# Patient Record
Sex: FEMALE | Race: WHITE | NOT HISPANIC OR LATINO | Employment: OTHER | URBAN - METROPOLITAN AREA
[De-identification: names, ages, dates, MRNs, and addresses within clinical notes are randomized per-mention and may not be internally consistent; named-entity substitution may affect disease eponyms.]

---

## 2017-02-07 ENCOUNTER — COMMUNICATION - HEALTHEAST (OUTPATIENT)
Dept: INFECTIOUS DISEASES | Facility: CLINIC | Age: 82
End: 2017-02-07

## 2017-02-07 DIAGNOSIS — T84.59XA INFECTED PROSTHETIC KNEE JOINT (H): ICD-10-CM

## 2017-02-07 DIAGNOSIS — Z96.659 INFECTED PROSTHETIC KNEE JOINT (H): ICD-10-CM

## 2017-02-08 ENCOUNTER — COMMUNICATION - HEALTHEAST (OUTPATIENT)
Dept: INFECTIOUS DISEASES | Facility: CLINIC | Age: 82
End: 2017-02-08

## 2017-02-15 ENCOUNTER — OFFICE VISIT (OUTPATIENT)
Dept: FAMILY MEDICINE | Facility: CLINIC | Age: 82
End: 2017-02-15
Payer: MEDICARE

## 2017-02-15 VITALS
DIASTOLIC BLOOD PRESSURE: 84 MMHG | WEIGHT: 149 LBS | SYSTOLIC BLOOD PRESSURE: 138 MMHG | HEIGHT: 65 IN | TEMPERATURE: 98.4 F | HEART RATE: 84 BPM | BODY MASS INDEX: 24.83 KG/M2

## 2017-02-15 DIAGNOSIS — M19.90 ARTHRITIS: ICD-10-CM

## 2017-02-15 DIAGNOSIS — K21.9 GASTROESOPHAGEAL REFLUX DISEASE WITHOUT ESOPHAGITIS: ICD-10-CM

## 2017-02-15 DIAGNOSIS — E87.1 HYPONATREMIA: Primary | ICD-10-CM

## 2017-02-15 DIAGNOSIS — T84.50XS JOINT PROSTHESIS INFECTION OR INFLAMMATION, SEQUELA: Chronic | ICD-10-CM

## 2017-02-15 DIAGNOSIS — K30 FUNCTIONAL DYSPEPSIA: ICD-10-CM

## 2017-02-15 DIAGNOSIS — D50.0 IRON DEFICIENCY ANEMIA DUE TO CHRONIC BLOOD LOSS: ICD-10-CM

## 2017-02-15 LAB
ALBUMIN SERPL-MCNC: 3.3 G/DL (ref 3.4–5)
ALP SERPL-CCNC: 109 U/L (ref 40–150)
ALT SERPL W P-5'-P-CCNC: 19 U/L (ref 0–50)
ANION GAP SERPL CALCULATED.3IONS-SCNC: 6 MMOL/L (ref 3–14)
AST SERPL W P-5'-P-CCNC: 13 U/L (ref 0–45)
BILIRUB SERPL-MCNC: 0.3 MG/DL (ref 0.2–1.3)
BUN SERPL-MCNC: 12 MG/DL (ref 7–30)
CALCIUM SERPL-MCNC: 8.6 MG/DL (ref 8.5–10.1)
CHLORIDE SERPL-SCNC: 99 MMOL/L (ref 94–109)
CO2 SERPL-SCNC: 29 MMOL/L (ref 20–32)
CREAT SERPL-MCNC: 0.51 MG/DL (ref 0.52–1.04)
ERYTHROCYTE [DISTWIDTH] IN BLOOD BY AUTOMATED COUNT: 13.3 % (ref 10–15)
GFR SERPL CREATININE-BSD FRML MDRD: ABNORMAL ML/MIN/1.7M2
GLUCOSE SERPL-MCNC: 98 MG/DL (ref 70–99)
HCT VFR BLD AUTO: 36.3 % (ref 35–47)
HGB BLD-MCNC: 12 G/DL (ref 11.7–15.7)
MCH RBC QN AUTO: 29.9 PG (ref 26.5–33)
MCHC RBC AUTO-ENTMCNC: 33.1 G/DL (ref 31.5–36.5)
MCV RBC AUTO: 90 FL (ref 78–100)
PLATELET # BLD AUTO: 280 10E9/L (ref 150–450)
POTASSIUM SERPL-SCNC: 4.5 MMOL/L (ref 3.4–5.3)
PROT SERPL-MCNC: 6.5 G/DL (ref 6.8–8.8)
RBC # BLD AUTO: 4.02 10E12/L (ref 3.8–5.2)
SODIUM SERPL-SCNC: 134 MMOL/L (ref 133–144)
WBC # BLD AUTO: 5.6 10E9/L (ref 4–11)

## 2017-02-15 PROCEDURE — 80053 COMPREHEN METABOLIC PANEL: CPT | Performed by: FAMILY MEDICINE

## 2017-02-15 PROCEDURE — 36415 COLL VENOUS BLD VENIPUNCTURE: CPT | Performed by: FAMILY MEDICINE

## 2017-02-15 PROCEDURE — 99214 OFFICE O/P EST MOD 30 MIN: CPT | Performed by: FAMILY MEDICINE

## 2017-02-15 PROCEDURE — 85027 COMPLETE CBC AUTOMATED: CPT | Performed by: FAMILY MEDICINE

## 2017-02-15 ASSESSMENT — ANXIETY QUESTIONNAIRES
2. NOT BEING ABLE TO STOP OR CONTROL WORRYING: NOT AT ALL
6. BECOMING EASILY ANNOYED OR IRRITABLE: SEVERAL DAYS
3. WORRYING TOO MUCH ABOUT DIFFERENT THINGS: SEVERAL DAYS
1. FEELING NERVOUS, ANXIOUS, OR ON EDGE: SEVERAL DAYS
7. FEELING AFRAID AS IF SOMETHING AWFUL MIGHT HAPPEN: NOT AT ALL
GAD7 TOTAL SCORE: 4
5. BEING SO RESTLESS THAT IT IS HARD TO SIT STILL: NOT AT ALL

## 2017-02-15 ASSESSMENT — PATIENT HEALTH QUESTIONNAIRE - PHQ9: 5. POOR APPETITE OR OVEREATING: SEVERAL DAYS

## 2017-02-15 NOTE — NURSING NOTE
"Chief Complaint   Patient presents with     RECHECK     check in       Initial /84 (BP Location: Right arm, Cuff Size: Adult Small)  Pulse 84  Temp 98.4  F (36.9  C) (Tympanic)  Ht 5' 5\" (1.651 m)  Wt 149 lb (67.6 kg)  BMI 24.79 kg/m2 Estimated body mass index is 24.79 kg/(m^2) as calculated from the following:    Height as of this encounter: 5' 5\" (1.651 m).    Weight as of this encounter: 149 lb (67.6 kg).  Medication Reconciliation: complete  "

## 2017-02-15 NOTE — MR AVS SNAPSHOT
After Visit Summary   2/15/2017    Arielle Stallworth    MRN: [id number]           Patient Information     Date Of Birth          6/9/1931        Visit Information        Provider Department      2/15/2017 10:00 AM Constance Cohen MD [facility]        Today's Diagnoses     Hyponatremia    -  1    Iron deficiency anemia due to chronic blood loss        Arthritis        Joint prosthesis infection or inflammation, sequela        Gastroesophageal reflux disease without esophagitis        Functional dyspepsia          Care Instructions    I will send you the labs when they are back with instructions  I would like see you back in 6 months         Follow-ups after your visit        Follow-up notes from your care team     Return in about 6 months (around 8/15/2017).      Who to contact     Normal or non-critical lab and imaging results will be communicated to you by [employer], letter or phone within 4 business days after the clinic has received the results. If you do not hear from us within 7 days, please contact the clinic through [employer] or phone. If you have a critical or abnormal lab result, we will notify you by phone as soon as possible.  Submit refill requests through [employer] or call your pharmacy and they will forward the refill request to us. Please allow 3 business days for your refill to be completed.          If you need to speak with a  for additional information , please call: [phone]             Additional Information About Your Visit        [employer] Information     [employer] gives you secure access to your electronic health record. If you see a primary care provider, you can also send messages to your care team and make appointments. If you have questions, please call your primary care clinic.  If you do not have a primary care provider, please call [phone] and they will assist you.        Care EveryWhere ID     This is your Care EveryWhere ID. This could be used  "by other organizations to access your [place] medical records  [id number]        Your Vitals Were     Pulse Temperature Height BMI (Body Mass Index)          84 98.4  F (36.9  C) (Tympanic) 5' 5\" (1.651 m) 24.79 kg/m2         Blood Pressure from Last 3 Encounters:   02/15/17 138/84   07/20/16 117/71   04/29/16 132/86    Weight from Last 3 Encounters:   02/15/17 149 lb (67.6 kg)   07/20/16 154 lb (69.9 kg)   03/09/16 157 lb (71.2 kg)              We Performed the Following     CBC with platelets     Comprehensive metabolic panel          Today's Medication Changes          These changes are accurate as of: 2/15/17 11:59 PM.  If you have any questions, ask your nurse or doctor.               These medicines have changed or have updated prescriptions.        Dose/Directions    ranitidine 150 MG tablet   Commonly known as:  ZANTAC   This may have changed:  additional instructions   Used for:  Functional dyspepsia, Gastroesophageal reflux disease without esophagitis   Changed by:  Constance Cohen MD        Dose:  150 mg   Take 1 tablet (150 mg) by mouth 2 times daily May have an additional third dose after lunch as needed   Quantity:  270 tablet   Refills:  3       triamcinolone 0.1 % cream   Commonly known as:  KENALOG   This may have changed:  Another medication with the same name was removed. Continue taking this medication, and follow the directions you see here.   Used for:  Dry skin dermatitis   Changed by:  Constance Cohen MD        Apply sparingly to affected area three times daily as needed   Quantity:  80 g   Refills:  0            Where to get your medicines      These medications were sent to [facility] - [place], [place] - [address]  [address], [facility] [facility] [address]     Phone:  [phone]     ranitidine 150 MG tablet                Primary Care Provider Office Phone # Fax #    Constance Cohen [phone]       [facility] [address] " [address] [facility] [address]        Thank you!     Thank you for choosing [facility]  for your care. Our goal is always to provide you with excellent care. Hearing back from our patients is one way we can continue to improve our services. Please take a few minutes to complete the written survey that you may receive in the mail after your visit with us. Thank you!             Your Updated Medication List - Protect others around you: Learn how to safely use, store and throw away your medicines at www.disposemymeds.org.          This list is accurate as of: 2/15/17 11:59 PM.  Always use your most recent med list.                   Brand Name Dispense Instructions for use    acetaminophen 650 MG 8 hour tablet     100 tablet    Take 650 mg by mouth every 4 hours as needed for mild pain       aspirin 81 MG EC tablet     90 tablet    Take 1 tablet (81 mg) by mouth daily       calcium 600/vitamin D 600-400 MG-UNIT per tablet   Generic drug:  calcium-vitamin D     60 tablet    Take 1 tablet by mouth 2 times daily       Doxycycline Hyclate 150 MG Tabs     60 tablet    Take 150 mg by mouth 2 times daily Resume once course of levaquin is completed       EFFEXOR  MG 24 hr capsule   Generic drug:  venlafaxine      1 CAPSULE BY MOUTH DAILY WITH FOOD       LORazepam 0.5 MG tablet    ATIVAN    24 tablet    Take 1 tablet (0.5 mg) by mouth every 8 hours as needed for anxiety       LUMIGAN 0.01 % Soln   Generic drug:  bimatoprost      Place 1 drop into both eyes At Bedtime.       MELATONIN PO      Take 3 mg by mouth nightly as needed       MIRTAZAPINE PO      Take 30 mg by mouth At Bedtime       MULTI-VITAMIN PO      Take 1 tablet by mouth daily.       order for DME     1 Units    Equipment being ordered: Wheelchair with back and seat cushion       order for DME     1 Units    Equipment being ordered: orthopedic shoes with riser for limb length disrepancy from       penicillin V potassium 500 MG tablet    VEETID      Take 500 mg by mouth 2 times daily       PROBIOTIC DAILY PO      Take by mouth daily       ranitidine 150 MG tablet    ZANTAC    270 tablet    Take 1 tablet (150 mg) by mouth 2 times daily May have an additional third dose after lunch as needed       sodium chloride 1 GM tablet     90 tablet    Take m w f but may need to take daily if sodium out of range       STOOL SOFTENER PO      Take 100 mg by mouth daily.       traZODone 50 MG tablet    DESYREL     Take by mouth At Bedtime 1 and 1/2 to 2 tabs       triamcinolone 0.1 % cream    KENALOG    80 g    Apply sparingly to affected area three times daily as needed

## 2017-02-15 NOTE — PATIENT INSTRUCTIONS
I will send you the labs when they are back with instructions  I would like see you back in 6 months

## 2017-02-16 ASSESSMENT — PATIENT HEALTH QUESTIONNAIRE - PHQ9: SUM OF ALL RESPONSES TO PHQ QUESTIONS 1-9: 4

## 2017-02-16 ASSESSMENT — ANXIETY QUESTIONNAIRES: GAD7 TOTAL SCORE: 4

## 2017-02-20 NOTE — PROGRESS NOTES
Arielle,  Your lab results were normal/stable. Please feel free to my chart or call the office with questions. Constance Cohen M.D.

## 2017-02-23 ENCOUNTER — AMBULATORY - HEALTHEAST (OUTPATIENT)
Dept: CARDIOLOGY | Facility: CLINIC | Age: 82
End: 2017-02-23

## 2017-02-23 ENCOUNTER — TRANSFERRED RECORDS (OUTPATIENT)
Dept: HEALTH INFORMATION MANAGEMENT | Facility: CLINIC | Age: 82
End: 2017-02-23

## 2017-02-23 DIAGNOSIS — Z95.0 PACEMAKER: ICD-10-CM

## 2017-05-16 ENCOUNTER — OFFICE VISIT - HEALTHEAST (OUTPATIENT)
Dept: INFECTIOUS DISEASES | Facility: CLINIC | Age: 82
End: 2017-05-16

## 2017-05-16 ENCOUNTER — TRANSFERRED RECORDS (OUTPATIENT)
Dept: HEALTH INFORMATION MANAGEMENT | Facility: CLINIC | Age: 82
End: 2017-05-16

## 2017-05-16 DIAGNOSIS — T84.59XD CHRONIC INFECTION OF KNEE JOINT PROSTHESIS, SUBSEQUENT ENCOUNTER: ICD-10-CM

## 2017-05-16 DIAGNOSIS — Z96.659 CHRONIC INFECTION OF KNEE JOINT PROSTHESIS, SUBSEQUENT ENCOUNTER: ICD-10-CM

## 2017-06-01 ENCOUNTER — AMBULATORY - HEALTHEAST (OUTPATIENT)
Dept: CARDIOLOGY | Facility: CLINIC | Age: 82
End: 2017-06-01

## 2017-06-01 DIAGNOSIS — Z95.0 CARDIAC PACEMAKER IN SITU: ICD-10-CM

## 2017-06-01 LAB — HCC DEVICE COMMENTS: NORMAL

## 2017-06-27 ENCOUNTER — DOCUMENTATION ONLY (OUTPATIENT)
Dept: LAB | Facility: CLINIC | Age: 82
End: 2017-06-27

## 2017-06-27 DIAGNOSIS — E87.1 HYPONATREMIA: Primary | ICD-10-CM

## 2017-06-27 DIAGNOSIS — Z13.220 SCREENING CHOLESTEROL LEVEL: Primary | ICD-10-CM

## 2017-06-27 LAB
ALBUMIN SERPL-MCNC: 3.5 G/DL (ref 3.4–5)
ALP SERPL-CCNC: 103 U/L (ref 40–150)
ALT SERPL W P-5'-P-CCNC: 23 U/L (ref 0–50)
ANION GAP SERPL CALCULATED.3IONS-SCNC: 8 MMOL/L (ref 3–14)
AST SERPL W P-5'-P-CCNC: 25 U/L (ref 0–45)
BASOPHILS # BLD AUTO: 0 10E9/L (ref 0–0.2)
BASOPHILS NFR BLD AUTO: 0.5 %
BILIRUB SERPL-MCNC: 0.3 MG/DL (ref 0.2–1.3)
BUN SERPL-MCNC: 15 MG/DL (ref 7–30)
CALCIUM SERPL-MCNC: 9 MG/DL (ref 8.5–10.1)
CHLORIDE SERPL-SCNC: 95 MMOL/L (ref 94–109)
CO2 SERPL-SCNC: 26 MMOL/L (ref 20–32)
CREAT SERPL-MCNC: 0.49 MG/DL (ref 0.52–1.04)
DIFFERENTIAL METHOD BLD: NORMAL
EOSINOPHIL # BLD AUTO: 0.3 10E9/L (ref 0–0.7)
EOSINOPHIL NFR BLD AUTO: 4.3 %
ERYTHROCYTE [DISTWIDTH] IN BLOOD BY AUTOMATED COUNT: 13 % (ref 10–15)
GFR SERPL CREATININE-BSD FRML MDRD: ABNORMAL ML/MIN/1.7M2
GLUCOSE SERPL-MCNC: 117 MG/DL (ref 70–99)
HCT VFR BLD AUTO: 37 % (ref 35–47)
HGB BLD-MCNC: 12.3 G/DL (ref 11.7–15.7)
LYMPHOCYTES # BLD AUTO: 1 10E9/L (ref 0.8–5.3)
LYMPHOCYTES NFR BLD AUTO: 15.7 %
MCH RBC QN AUTO: 30.3 PG (ref 26.5–33)
MCHC RBC AUTO-ENTMCNC: 33.2 G/DL (ref 31.5–36.5)
MCV RBC AUTO: 91 FL (ref 78–100)
MONOCYTES # BLD AUTO: 0.5 10E9/L (ref 0–1.3)
MONOCYTES NFR BLD AUTO: 8.4 %
NEUTROPHILS # BLD AUTO: 4.3 10E9/L (ref 1.6–8.3)
NEUTROPHILS NFR BLD AUTO: 71.1 %
PLATELET # BLD AUTO: 284 10E9/L (ref 150–450)
POTASSIUM SERPL-SCNC: 4.5 MMOL/L (ref 3.4–5.3)
PROT SERPL-MCNC: 6.9 G/DL (ref 6.8–8.8)
RBC # BLD AUTO: 4.06 10E12/L (ref 3.8–5.2)
SODIUM SERPL-SCNC: 129 MMOL/L (ref 133–144)
WBC # BLD AUTO: 6.1 10E9/L (ref 4–11)

## 2017-06-27 PROCEDURE — 36415 COLL VENOUS BLD VENIPUNCTURE: CPT | Performed by: FAMILY MEDICINE

## 2017-06-27 PROCEDURE — 80053 COMPREHEN METABOLIC PANEL: CPT | Performed by: FAMILY MEDICINE

## 2017-06-27 PROCEDURE — 85025 COMPLETE CBC W/AUTO DIFF WBC: CPT | Performed by: FAMILY MEDICINE

## 2017-06-27 NOTE — PROGRESS NOTES
Dr. Cohen: Lipids ordered. Not sure what else to order for lab draw this afternoon? Thank you.  Bryant Raman RN

## 2017-06-27 NOTE — PROGRESS NOTES
PATIENT coming in at 2 this afternoon for labwork.  No orders in epic.  Only due for LIPID in Health Maintenance and she probably wont be fasting that late in afternoon.  Lab appointment notes states labs for darling.  Please put in future order or call patient and tell her not to come in if nothing needed.  Thanks! Mikala

## 2017-06-30 ENCOUNTER — TELEPHONE (OUTPATIENT)
Dept: FAMILY MEDICINE | Facility: CLINIC | Age: 82
End: 2017-06-30

## 2017-06-30 DIAGNOSIS — E87.1 HYPONATREMIA: Primary | ICD-10-CM

## 2017-06-30 NOTE — TELEPHONE ENCOUNTER
Reason for Call:  Request for results:    Name of test or procedure: labs drawn on 6/27/17.  Daughter Frances is calling hoping to get Arielle's lab results.  Please review and advise. Thank you..Lucia Edwards    Date of test of procedure: 6/27/17    Location of the test or procedure: blood draw    OK to leave the result message on voice mail or with a family member? YES    Phone number Patient can be reached at:  Other phone number:  [facility] - [phone]    Additional comments: none    Call taken on 6/30/2017 at 1:11 PM by Lucia Edwards

## 2017-07-01 ENCOUNTER — NURSE TRIAGE (OUTPATIENT)
Dept: NURSING | Facility: CLINIC | Age: 82
End: 2017-07-01

## 2017-07-01 NOTE — TELEPHONE ENCOUNTER
Regarding: Patient is looking for her sodium level results. Thank you.  ----- Message from Martín Tomlin sent at 7/1/2017  9:43 AM CDT -----  Reason for Call:  Request for results:    Name of test or procedure: Comprehensive metabolic panel    Date of test of procedure: 06/27/2017    Location of the test or procedure: [facility] to leave the result message on voice mail or with a family member? YES    Phone number Patient can be reached at:  Other phone number:  [phone]    Additional comments: Patient looking for the Sodium levels.    Call taken on 7/1/2017 at 9:41 AM by Martín Tomlin

## 2017-07-01 NOTE — TELEPHONE ENCOUNTER
"  Additional Information    Negative: [1] Difficult to awaken or acting confused (disoriented, slurred speech) AND [2] present now AND [3] diabetic    Negative: [1] Difficult to awaken or acting confused (disoriented, slurred speech) AND [2] present now AND [3] new onset    Negative: [1] Weakness of the face, arm, or leg on one side of the body AND [2] new onset    Negative: [1] Numbness of the face, arm, or leg on one side of the body AND [2] new onset    Negative: [1] Loss of speech or garbled speech AND [2] new onset    Negative: Difficulty breathing or bluish lips    Negative: Shock suspected (e.g., cold/pale/clammy skin, too weak to stand, low BP, rapid pulse)    Negative: Seeing, hearing, or feeling things that are not there (i.e., visual, auditory, or tactile hallucinations)    Negative: Followed a head injury    Negative: Drug overdose suspected    Negative: Sounds like a life-threatening emergency to the triager    Negative: Alcohol use, abuse or dependence: question or problem related to    Negative: Drug abuse or dependence: question or problem related to    Negative: Headache or vomiting    Negative: Stiff neck (can't touch chin to chest)    Negative: Bizarre or paranoid behavior    Negative: Fever > 100.5 F (38.1 C)    Negative: Patient sounds very sick or weak to the triager    Negative: [1] Acting confused (e.g., disoriented, slurred speech) AND [2] brief (now gone)    Negative: [1] Longstanding confusion (e.g., dementia, stroke) AND [2] worsening    Negative: [1] Longstanding confusion (e.g., dementia, stroke) AND [2] NO worsening or change    Sundowning, questions about    Protocols used: CONFUSION - DELIRIUM-ADULT-AH  Daughter calling. \"I'm just calling to find out what my mom's sodium level is. She gets confused when her sodium level is low. Mom seems to be more anxious the last couple of evenings.\"  Lori Mariano RN  [facility]    "

## 2017-07-11 NOTE — TELEPHONE ENCOUNTER
The sodium is a little lower but not as low as before. I would recommend increasing the sodium intake by 1 gram daily for the next week and getting the lab rechecked . The kidney function is fine and the blood count is normal. Constance Cohen M.D.

## 2017-07-12 NOTE — TELEPHONE ENCOUNTER
Spoke with the daughter Frances who is authorized to speak for patient and advised of note below. Frances states patient is only taking 2 gm sodium chloride tablet per week, so advised per note below to increase 1 gm daily for a week, then recheck the sodium. Daughter will call back to schedule the sodium. Sodium lab placed per note from PCP below.   OLIVER Samayoa

## 2017-07-24 ENCOUNTER — ALLIED HEALTH/NURSE VISIT (OUTPATIENT)
Dept: FAMILY MEDICINE | Facility: CLINIC | Age: 82
End: 2017-07-24
Payer: MEDICARE

## 2017-07-24 VITALS — SYSTOLIC BLOOD PRESSURE: 143 MMHG | HEART RATE: 86 BPM | DIASTOLIC BLOOD PRESSURE: 87 MMHG

## 2017-07-24 DIAGNOSIS — Z01.30 BLOOD PRESSURE CHECK: Primary | ICD-10-CM

## 2017-07-24 DIAGNOSIS — E87.1 HYPONATREMIA: ICD-10-CM

## 2017-07-24 LAB — SODIUM SERPL-SCNC: 131 MMOL/L (ref 133–144)

## 2017-07-24 PROCEDURE — 36415 COLL VENOUS BLD VENIPUNCTURE: CPT | Performed by: FAMILY MEDICINE

## 2017-07-24 PROCEDURE — 99207 ZZC NO CHARGE NURSE ONLY: CPT

## 2017-07-24 PROCEDURE — 84295 ASSAY OF SERUM SODIUM: CPT | Performed by: FAMILY MEDICINE

## 2017-07-24 NOTE — PROGRESS NOTES
Patient is currently taking the Sodium Chloride 1 Gm every other day.     BP Readings from Last 6 Encounters:   07/24/17 143/87   02/15/17 138/84   07/20/16 117/71   04/29/16 132/86   03/09/16 136/78   07/29/15 151/81

## 2017-07-24 NOTE — MR AVS SNAPSHOT
After Visit Summary   7/24/2017    Arielle Stallworth    MRN: [id number]           Patient Information     Date Of Birth          6/9/1931        Visit Information        Provider Department      7/24/2017 2:15 PM [facility]        Today's Diagnoses     Blood pressure check    -  1       Follow-ups after your visit        Who to contact     Normal or non-critical lab and imaging results will be communicated to you by [employer], letter or phone within 4 business days after the clinic has received the results. If you do not hear from us within 7 days, please contact the clinic through [employer] or phone. If you have a critical or abnormal lab result, we will notify you by phone as soon as possible.  Submit refill requests through [employer] or call your pharmacy and they will forward the refill request to us. Please allow 3 business days for your refill to be completed.          If you need to speak with a  for additional information , please call: [phone]             Additional Information About Your Visit        [employer] [facility] Information     [employer] gives you secure access to your electronic health record. If you see a primary care provider, you can also send messages to your care team and make appointments. If you have questions, please call your primary care clinic.  If you do not have a primary care provider, please call [phone] and they will assist you.        Care EveryWhere ID     This is your Care EveryWhere ID. This could be used by other organizations to access your [place] medical records  [id number]        Your Vitals Were     Pulse                   86            Blood Pressure from Last 3 Encounters:   07/24/17 143/87   02/15/17 138/84   07/20/16 117/71    Weight from Last 3 Encounters:   02/15/17 149 lb (67.6 kg)   07/20/16 154 lb (69.9 kg)   03/09/16 157 lb (71.2 kg)              Today, you had the following     No orders found for display        Primary Care Provider Office Phone # Fax #    Constance Cohen [phone]       [facility] [address] [address] [address]        Equal Access to Services     EMANUEL CENTENO : Hadii ofe ku hadchero Soomaali, waaxda luqadaha, qaybta kaalmada adeegyada, vickie stephensdeven humphrey. So [facility] [phone].    ATENCIÓN: Si habla español, tiene a earl disposición servicios gratuitos de asistencia lingüística. Llame al [phone].    We comply with applicable federal civil rights laws and [place] laws. We do not discriminate on the basis of race, color, national origin, age, disability sex, sexual orientation or gender identity.            Thank you!     Thank you for choosing [facility]]  for your care. Our goal is always to provide you with excellent care. Hearing back from our patients is one way we can continue to improve our services. Please take a few minutes to complete the written survey that you may receive in the mail after your visit with us. Thank you!             Your Updated Medication List - Protect others around you: Learn how to safely use, store and throw away your medicines at www.disposemymeds.org.          This list is accurate as of: 7/24/17  3:10 PM.  Always use your most recent med list.                   Brand Name Dispense Instructions for use Diagnosis    acetaminophen 650 MG 8 hour tablet     100 tablet    Take 650 mg by mouth every 4 hours as needed for mild pain    Left leg cellulitis       aspirin 81 MG EC tablet     90 tablet    Take 1 tablet (81 mg) by mouth daily    Acquired absence of knee joint       calcium 600/vitamin D 600-400 MG-UNIT per tablet   Generic drug:  calcium-vitamin D     60 tablet    Take 1 tablet by mouth 2 times daily        Doxycycline Hyclate 150 MG Tabs     60 tablet    Take 150 mg by mouth 2 times daily Resume once course of levaquin is completed    Left leg cellulitis       EFFEXOR  MG 24 hr capsule    Generic drug:  venlafaxine      1 CAPSULE BY MOUTH DAILY WITH FOOD        LORazepam 0.5 MG tablet    ATIVAN    24 tablet    Take 1 tablet (0.5 mg) by mouth every 8 hours as needed for anxiety    DORON (generalized anxiety disorder)       LUMIGAN 0.01 % Soln   Generic drug:  bimatoprost      Place 1 drop into both eyes At Bedtime.        MELATONIN PO      Take 3 mg by mouth nightly as needed        MIRTAZAPINE PO      Take 30 mg by mouth At Bedtime        MULTI-VITAMIN PO      Take 1 tablet by mouth daily.        order for DME     1 Units    Equipment being ordered: Wheelchair with back and seat cushion    Acquired absence of knee joint       order for DME     1 Units    Equipment being ordered: orthopedic shoes with riser for limb length disrepancy from    Acquired leg length discrepancy, Joint prosthesis infection or inflammation, sequela       penicillin V potassium 500 MG tablet    VEETID     Take 500 mg by mouth 2 times daily        PROBIOTIC DAILY PO      Take by mouth daily        ranitidine 150 MG tablet    ZANTAC    270 tablet    Take 1 tablet (150 mg) by mouth 2 times daily May have an additional third dose after lunch as needed    Functional dyspepsia, Gastroesophageal reflux disease without esophagitis       sodium chloride 1 GM tablet     90 tablet    Take m w f but may need to take daily if sodium out of range    Hyponatremia       STOOL SOFTENER PO      Take 100 mg by mouth daily.        traZODone 50 MG tablet    DESYREL     Take by mouth At Bedtime 1 and 1/2 to 2 tabs        triamcinolone 0.1 % cream    KENALOG    80 g    Apply sparingly to affected area three times daily as needed    Dry skin dermatitis

## 2017-07-27 ENCOUNTER — COMMUNICATION - HEALTHEAST (OUTPATIENT)
Dept: INFECTIOUS DISEASES | Facility: CLINIC | Age: 82
End: 2017-07-27

## 2017-07-27 DIAGNOSIS — T84.59XA INFECTED PROSTHETIC KNEE JOINT (H): ICD-10-CM

## 2017-07-27 DIAGNOSIS — Z96.659 INFECTED PROSTHETIC KNEE JOINT (H): ICD-10-CM

## 2017-07-31 ENCOUNTER — COMMUNICATION - HEALTHEAST (OUTPATIENT)
Dept: INFECTIOUS DISEASES | Facility: CLINIC | Age: 82
End: 2017-07-31

## 2017-07-31 DIAGNOSIS — Z96.659 INFECTED PROSTHETIC KNEE JOINT (H): ICD-10-CM

## 2017-07-31 DIAGNOSIS — T84.59XA INFECTED PROSTHETIC KNEE JOINT (H): ICD-10-CM

## 2017-08-01 ENCOUNTER — COMMUNICATION - HEALTHEAST (OUTPATIENT)
Dept: INFECTIOUS DISEASES | Facility: CLINIC | Age: 82
End: 2017-08-01

## 2017-08-02 ENCOUNTER — COMMUNICATION - HEALTHEAST (OUTPATIENT)
Dept: INFECTIOUS DISEASES | Facility: CLINIC | Age: 82
End: 2017-08-02

## 2017-08-22 DIAGNOSIS — L30.9 DERMATITIS: Primary | ICD-10-CM

## 2017-08-22 NOTE — TELEPHONE ENCOUNTER
NYSTATIN-TRIAMCINOL 468369-0.1 OINT        Last Written Prescription Date: 7/20/16  Last Fill Quantity: 80,  # refills: 0   Last Office Visit with [facility], [facility] or [facility] prescribing provider: 2/15/17

## 2017-08-23 RX ORDER — NYSTATIN AND TRIAMCINOLONE ACETONIDE 100000; 1 [USP'U]/G; MG/G
OINTMENT TOPICAL
Qty: 30 G | Refills: 0 | Status: SHIPPED | OUTPATIENT
Start: 2017-08-23 | End: 2018-04-10

## 2017-08-23 NOTE — TELEPHONE ENCOUNTER
Routing refill request to provider for review/approval because:  Drug not on the [facility] refill protocol   Bryant Raman RN

## 2017-09-07 ENCOUNTER — AMBULATORY - HEALTHEAST (OUTPATIENT)
Dept: CARDIOLOGY | Facility: CLINIC | Age: 82
End: 2017-09-07

## 2017-09-07 DIAGNOSIS — Z95.0 CARDIAC PACEMAKER IN SITU: ICD-10-CM

## 2017-09-07 LAB — HCC DEVICE COMMENTS: NORMAL

## 2017-09-30 DIAGNOSIS — E87.1 HYPONATREMIA: ICD-10-CM

## 2017-10-02 RX ORDER — SODIUM CHLORIDE 1 G/1
TABLET ORAL
Qty: 90 TABLET | Refills: 0 | Status: SHIPPED | OUTPATIENT
Start: 2017-10-02 | End: 2018-06-04

## 2017-10-02 NOTE — TELEPHONE ENCOUNTER
sodium chloride 1 GM tablet      Last Written Prescription Date: 7/20/16  Last Fill Quantity: 90,  # refills: 3   Last Office Visit with [facility], [facility] or [facility] prescribing provider: 2/15/17

## 2017-10-06 ENCOUNTER — TELEPHONE (OUTPATIENT)
Dept: FAMILY MEDICINE | Facility: CLINIC | Age: 82
End: 2017-10-06

## 2017-10-06 DIAGNOSIS — E87.1 HYPONATREMIA: Primary | ICD-10-CM

## 2017-10-06 DIAGNOSIS — D50.8 OTHER IRON DEFICIENCY ANEMIA: ICD-10-CM

## 2017-10-06 NOTE — TELEPHONE ENCOUNTER
Reason for Call: Request for an order or referral:    Order or referral being requested: Arielle has appointment on 10/11/17 and she is wanting to get sodium, iron and whatever other labs she needs done before she is seen.  Please review and place orders if appropriate.  She would like to be called when orders are placed.  Thank you..Lucia Edwards    Date needed: before 10/11/17.      Has the patient been seen by the PCP for this problem? NO - but has appointment on 10/11/17    Additional comments: n/a    Phone number Patient can be reached at:  Home number on file [phone] (home)    Best Time:  Any time    Can we leave a detailed message on this number?  YES    Call taken on 10/6/2017 at 3:47 PM by Lucia Edwards

## 2017-10-11 ENCOUNTER — OFFICE VISIT (OUTPATIENT)
Dept: FAMILY MEDICINE | Facility: CLINIC | Age: 82
End: 2017-10-11
Payer: MEDICARE

## 2017-10-11 VITALS
SYSTOLIC BLOOD PRESSURE: 146 MMHG | WEIGHT: 149 LBS | TEMPERATURE: 98.6 F | DIASTOLIC BLOOD PRESSURE: 94 MMHG | BODY MASS INDEX: 24.79 KG/M2 | HEART RATE: 81 BPM

## 2017-10-11 DIAGNOSIS — E87.1 HYPONATREMIA: Primary | ICD-10-CM

## 2017-10-11 DIAGNOSIS — K21.9 GASTROESOPHAGEAL REFLUX DISEASE WITHOUT ESOPHAGITIS: ICD-10-CM

## 2017-10-11 DIAGNOSIS — D50.8 OTHER IRON DEFICIENCY ANEMIA: ICD-10-CM

## 2017-10-11 DIAGNOSIS — R53.83 OTHER FATIGUE: ICD-10-CM

## 2017-10-11 LAB
BASOPHILS # BLD AUTO: 0 10E9/L (ref 0–0.2)
BASOPHILS NFR BLD AUTO: 0.7 %
DIFFERENTIAL METHOD BLD: NORMAL
EOSINOPHIL # BLD AUTO: 0.3 10E9/L (ref 0–0.7)
EOSINOPHIL NFR BLD AUTO: 5.8 %
ERYTHROCYTE [DISTWIDTH] IN BLOOD BY AUTOMATED COUNT: 12.7 % (ref 10–15)
HCT VFR BLD AUTO: 35.9 % (ref 35–47)
HGB BLD-MCNC: 12.1 G/DL (ref 11.7–15.7)
LYMPHOCYTES # BLD AUTO: 1.2 10E9/L (ref 0.8–5.3)
LYMPHOCYTES NFR BLD AUTO: 20.8 %
MCH RBC QN AUTO: 29.9 PG (ref 26.5–33)
MCHC RBC AUTO-ENTMCNC: 33.7 G/DL (ref 31.5–36.5)
MCV RBC AUTO: 89 FL (ref 78–100)
MONOCYTES # BLD AUTO: 0.6 10E9/L (ref 0–1.3)
MONOCYTES NFR BLD AUTO: 10.7 %
NEUTROPHILS # BLD AUTO: 3.4 10E9/L (ref 1.6–8.3)
NEUTROPHILS NFR BLD AUTO: 62 %
PLATELET # BLD AUTO: 267 10E9/L (ref 150–450)
RBC # BLD AUTO: 4.05 10E12/L (ref 3.8–5.2)
WBC # BLD AUTO: 5.5 10E9/L (ref 4–11)

## 2017-10-11 PROCEDURE — 99214 OFFICE O/P EST MOD 30 MIN: CPT | Performed by: FAMILY MEDICINE

## 2017-10-11 PROCEDURE — 80050 GENERAL HEALTH PANEL: CPT | Performed by: FAMILY MEDICINE

## 2017-10-11 PROCEDURE — 84443 ASSAY THYROID STIM HORMONE: CPT | Performed by: FAMILY MEDICINE

## 2017-10-11 PROCEDURE — 36415 COLL VENOUS BLD VENIPUNCTURE: CPT | Performed by: FAMILY MEDICINE

## 2017-10-11 PROCEDURE — 80053 COMPREHEN METABOLIC PANEL: CPT | Performed by: FAMILY MEDICINE

## 2017-10-11 RX ORDER — MECOBALAMIN 5000 MCG
TABLET,DISINTEGRATING ORAL
Qty: 60 CAPSULE | Refills: 3 | Status: SHIPPED | OUTPATIENT
Start: 2017-10-11 | End: 2018-04-10

## 2017-10-11 ASSESSMENT — ANXIETY QUESTIONNAIRES
7. FEELING AFRAID AS IF SOMETHING AWFUL MIGHT HAPPEN: NOT AT ALL
GAD7 TOTAL SCORE: 3
6. BECOMING EASILY ANNOYED OR IRRITABLE: NOT AT ALL
5. BEING SO RESTLESS THAT IT IS HARD TO SIT STILL: NOT AT ALL
1. FEELING NERVOUS, ANXIOUS, OR ON EDGE: SEVERAL DAYS
2. NOT BEING ABLE TO STOP OR CONTROL WORRYING: SEVERAL DAYS
3. WORRYING TOO MUCH ABOUT DIFFERENT THINGS: SEVERAL DAYS

## 2017-10-11 ASSESSMENT — PATIENT HEALTH QUESTIONNAIRE - PHQ9
SUM OF ALL RESPONSES TO PHQ QUESTIONS 1-9: 4
5. POOR APPETITE OR OVEREATING: NOT AT ALL

## 2017-10-11 NOTE — PATIENT INSTRUCTIONS
Take a break from the calcium  Take the prevacid 2 times per day for 2 weeks then decrease to 1 time daily   I will send you the labs when they are back

## 2017-10-11 NOTE — PROGRESS NOTES
SUBJECTIVE:                                                    Arielle Stallworth is a 86 year old female who presents to clinic today for the following health issues:    Chief Complaint   Patient presents with     Abdominal Pain     at lot of burning, has decreased since last week.        ABDOMINAL PAIN     Onset: of and on since last week.     Description:   Character: upset stomach   Location: lower epigastric region  Radiation: None    Progression of Symptoms:      Accompanying Signs & Symptoms:  Fever/Chills?: no   Gas/Bloating: YES  Nausea: YES  Vomitting: no   Diarrhea?: no   Constipation:YES  Dysuria or Hematuria: no    History:       Precipitating factors:   Does the pain change with:     Food: no      BM: no     Urination: no     Alleviating factors:  Tums    Therapies Tried and outcome:    LMP:  n/a         Problem list and histories reviewed & adjusted, as indicated.  Additional history: not taking her ppi she has history of  Hiatal hernia. We have tried carafate, zantac, pepcid, prilosec  Not helpful. She has been on long term doxy and pcn . This is the most likely  Cause of her dyspepsia but she still has decreased her oral intake due to the nausea  Has continued to see her psychiatrist . She is still a very negative person with negative outlook on life. Very hard to turn her toward any positive thought  History of  Hyponatremia, no recent sx ofthis last ab 131 pretty normal for her.   Reviewed studies in care everywhere and with her hiatal hernia  Antibiotics intake she will likely need PPI coverage for the rest of her life. We had discussed this previously but  Will reinforce this now   With change in medications she will likely increase her oral intake her nausea will improve     Wt Readings from Last 5 Encounters:   10/11/17 149 lb (67.6 kg)   02/15/17 149 lb (67.6 kg)   07/20/16 154 lb (69.9 kg)   03/09/16 157 lb (71.2 kg)   07/29/15 154 lb (69.9 kg)         Patient Active Problem List   Diagnosis      Cardiac pacemaker in situ     Advanced directives, counseling/discussion     Hyponatremia     Intertrigo     Depression, major     Arthritis     Cellulitis of left leg     Sepsis (H)     [facility]     Anxiety     Anemia     Joint prosthesis infection or inflammation (H)     Lymphedema of left lower extremity     Hyponatremia     Past Surgical History:   Procedure Laterality Date     CARPAL TUNNEL RELEASE RT/LT Left 3/2015    and excision mucous cyst lt thumb     CYSTOCELE REPAIR       HERNIA REPAIR      Twice     HERNIA REPAIR  3/2010    Reduction of a strangulated incisional inguinal hernia with repair of multiple incisional hernias with mesh.      HYSTERECTOMY       HYSTERECTOMY RADICAL      2002     JOINT REPLACEMENT      Left x2 and rt x1     ORTHOPEDIC SURGERY      Bilat. knees       Social History   Substance Use Topics     Smoking status: Former Smoker     Packs/day: 0.50     Years: 30.00     Types: Cigarettes     Quit date: 1/1/1960     Smokeless tobacco: Never Used     Alcohol use Yes      Comment: Occasional     No family history on file.      Current Outpatient Prescriptions   Medication Sig Dispense Refill     LANsoprazole (PREVACID) 15 MG CR capsule Take 30-60 minutes before a meal. Take 1 2 times per day for 2 weeks then decrease to 1 pill daily 60 capsule 3     sodium chloride 1 GM tablet TAKE ONE TABLET BY MOUTH ON MONDAY, WEDNESDAY, AND FRIDAY. TAKE DAILY IF SODIUM IS OUT OF RANGE 90 tablet 0     nystatin-triamcinolone (MYCOLOG) ointment APPLY TO AFFECTED AREA TWO TIMES A DAY 30 g 0     ranitidine (ZANTAC) 150 MG tablet Take 1 tablet (150 mg) by mouth 2 times daily May have an additional third dose after lunch as needed 270 tablet 3     order for DME Equipment being ordered: orthopedic shoes with riser for limb length disrepancy from 1 Units 0     triamcinolone (KENALOG) 0.1 % cream Apply sparingly to affected area three times daily as needed 80 g 0     penicillin V potassium (VEETID) 500 MG  tablet Take 500 mg by mouth 2 times daily       LORazepam (ATIVAN) 0.5 MG tablet Take 1 tablet (0.5 mg) by mouth every 8 hours as needed for anxiety 24 tablet 0     Doxycycline Hyclate 150 MG TABS Take 150 mg by mouth 2 times daily Resume once course of levaquin is completed 60 tablet      MIRTAZAPINE PO Take 30 mg by mouth At Bedtime       acetaminophen 650 MG TABS Take 650 mg by mouth every 4 hours as needed for mild pain 100 tablet      MELATONIN PO Take 3 mg by mouth nightly as needed       ORDER FOR DME Equipment being ordered: Wheelchair with back and seat cushion 1 Units 0     aspirin 81 MG EC tablet Take 1 tablet (81 mg) by mouth daily 90 tablet 3     traZODone (DESYREL) 50 MG tablet Take by mouth At Bedtime 1 and 1/2 to 2 tabs       Multiple Vitamin (MULTI-VITAMIN PO) Take 1 tablet by mouth daily.       Docusate Calcium (STOOL SOFTENER PO) Take 100 mg by mouth daily.       bimatoprost (LUMIGAN) 0.01 % SOLN Place 1 drop into both eyes At Bedtime.       EFFEXOR XR# 150 MG OR CP24 1 CAPSULE BY MOUTH DAILY WITH FOOD       Probiotic Product (PROBIOTIC DAILY PO) Take by mouth daily       Recent Labs   Lab Test  10/11/17   1433  06/27/17   1405  02/15/17   1106   06/22/15   0615   ALT  20  23  19   < >   --    CR  0.58  0.49*  0.51*   < >  0.50*   GFRESTIMATED  >90  >90  Non African American GFR Calc    >90  Non  GFR Calc     < >  >90  Non  GFR Calc     GFRESTBLACK  >90  >90  African American GFR Calc    >90   GFR Calc     < >  >90   GFR Calc     POTASSIUM  4.5  4.5  4.5   < >  3.9   TSH  1.55   --    --    --   1.16    < > = values in this interval not displayed.            ROS:  Constitutional, HEENT, cardiovascular, pulmonary, GI, , musculoskeletal, neuro, skin, endocrine and psych systems are negative, except as otherwise noted.      OBJECTIVE:                                                    BP (!) 146/94  Pulse 81  Temp 98.6  F (37  C)  (Tympanic)  Wt 149 lb (67.6 kg)  BMI 24.79 kg/m2 Body mass index is 24.79 kg/(m^2).   GENERAL: healthy, alert, well nourished, well hydrated, no distress  HENT: ear canals- normal; TMs- normal; Nose- normal; Mouth- no ulcers, no lesions  NECK: no tenderness, no adenopathy, no asymmetry, no masses, no stiffness; thyroid- normal to palpation  RESP: lungs clear to auscultation - no rales, no rhonchi, no wheezes  CV: regular rates and rhythm, normal S1 S2, no S3 or S4 and no murmur, no click or rub -  ABDOMEN: soft, no tenderness, no  hepatosplenomegaly, no masses, normal bowel sounds  Knee Exam: Inspection: AP/lateral alignment normal  Tender:overall mildly tender with braceon in wheel chair   Active Range of Motion: she can bend and her extension is full able to transfer from chair with assist   Strength: core strength  low and decreased strength left leg can transfer from wheelchair to bed she canot step more manolo 2-3 steps without walker able to walk with assistance   Special tests: normal Valgus stress test, normal Varus    Also examined: pes planus     SKIN: no suspicious lesions, no rashes     1. Hyponatremia  Recheck today has been betterwith current treatment   - **Comprehensive metabolic panel FUTURE 14d    2. Other iron deficiency anemia  Need recheck she has not been taking supplements . Her sleep has been disturbed. If this is low will restart iron replacement   - **Comprehensive metabolic panel FUTURE 14d  - **CBC with platelets differential FUTURE 2mo    3. Gastroesophageal reflux disease without esophagitis  Restart this she will most likely need to take this for the rest of her life due to possible hiatle hernis but also because she needs to sonu edoxy and pcn/  - LANsoprazole (PREVACID) 15 MG CR capsule; Take 30-60 minutes before a meal. Take 1 2 times per day for 2 weeks then decrease to 1 pill daily  Dispense: 60 capsule; Refill: 3    4. Other fatigue  Due to high level of thyroid failure in older  adults will check thyroid  Hornones.   - TSH with free T4 reflex    ASSESSMENT/PLAN:                                                     reports that she quit smoking about 57 years ago. Her smoking use included Cigarettes. She has a 15.00 pack-year smoking history. She has never used smokeless tobacco.      Patient Instructions   Take a break from the calcium  Take the prevacid 2 times per day for 2 weeks then decrease to 1 time daily   I will send you the labs when they are back        Weight management plan: Discussed healthy diet and exercise guidelines and patient will follow up in 3 months in clinic to re-evaluate.  Constance Cohen M.D.  [facility]

## 2017-10-11 NOTE — MR AVS SNAPSHOT
After Visit Summary   10/11/2017    Arielle Stallworth    MRN: [id number]           Patient Information     Date Of Birth          6/9/1931        Visit Information        Provider Department      10/11/2017 1:30 PM Constance Cohen MD [facility]        Today's Diagnoses     Hyponatremia    -  1    Other iron deficiency anemia        Gastroesophageal reflux disease without esophagitis        Other fatigue          Care Instructions    Take a break from the calcium  Take the prevacid 2 times per day for 2 weeks then decrease to 1 time daily   I will send you the labs when they are back            Follow-ups after your visit        Who to contact     Normal or non-critical lab and imaging results will be communicated to you by [employer], letter or phone within 4 business days after the clinic has received the results. If you do not hear from us within 7 days, please contact the clinic through [employer] or phone. If you have a critical or abnormal lab result, we will notify you by phone as soon as possible.  Submit refill requests through [employer] or call your pharmacy and they will forward the refill request to us. Please allow 3 business days for your refill to be completed.          If you need to speak with a  for additional information , please call: [phone]             Additional Information About Your Visit        M[employer]ar[employer]de Information     [employer] gives you secure access to your electronic health record. If you see a primary care provider, you can also send messages to your care team and make appointments. If you have questions, please call your primary care clinic.  If you do not have a primary care provider, please call [phone] and they will assist you.        Care EveryWhere ID     This is your Care EveryWhere ID. This could be used by other organizations to access your [place] medical records  [id number]        Your Vitals Were     Pulse Temperature BMI (Body Mass  Index)             81 98.6  F (37  C) (Tympanic) 24.79 kg/m2          Blood Pressure from Last 3 Encounters:   10/11/17 (!) 146/94   07/24/17 143/87   02/15/17 138/84    Weight from Last 3 Encounters:   10/11/17 149 lb (67.6 kg)   02/15/17 149 lb (67.6 kg)   07/20/16 154 lb (69.9 kg)              We Performed the Following     **CBC with platelets differential FUTURE 2mo     **Comprehensive metabolic panel FUTURE 14d     TSH with free T4 reflex          Today's Medication Changes          These changes are accurate as of: 10/11/17  2:31 PM.  If you have any questions, ask your nurse or doctor.               Start taking these medicines.        Dose/Directions    LANsoprazole 15 MG CR capsule   Commonly known as:  PREVACID   Used for:  Gastroesophageal reflux disease without esophagitis        Take 30-60 minutes before a meal. Take 1 2 times per day for 2 weeks then decrease to 1 pill daily   Quantity:  60 capsule   Refills:  3         Stop taking these medicines if you haven't already. Please contact your care team if you have questions.     calcium 600/vitamin D 600-400 MG-UNIT per tablet   Generic drug:  calcium-vitamin D                Where to get your medicines      These medications were sent to [facility] - [place], [place] - [address]  [address] [address]     Phone:  [phone]     LANsoprazole 15 MG CR capsule                Primary Care Provider Office Phone # Fax #    Constance Cohen [phone] [address] [facility] [address]        Equal Access to Services     [facility]: Hadii ofe dixon hadasho Sosulemaali, waaxda luqadaha, qaybta kaalmada adeegyamary, vickie yin. So [facility] [phone].    ATENCIÓN: Si habla español, tiene a earl disposición servicios gratuitos de asistencia lingüística. Llame al [phone].    We comply with applicable federal civil rights laws and [place] laws. We do  not discriminate on the basis of race, color, national origin, age, disability, sex, sexual orientation, or gender identity.            Thank you!     Thank you for choosing [facility]  for your care. Our goal is always to provide you with excellent care. Hearing back from our patients is one way we can continue to improve our services. Please take a few minutes to complete the written survey that you may receive in the mail after your visit with us. Thank you!             Your Updated Medication List - Protect others around you: Learn how to safely use, store and throw away your medicines at www.disposemymeds.org.          This list is accurate as of: 10/11/17  2:31 PM.  Always use your most recent med list.                   Brand Name Dispense Instructions for use Diagnosis    acetaminophen 650 MG 8 hour tablet     100 tablet    Take 650 mg by mouth every 4 hours as needed for mild pain    Left leg cellulitis       aspirin 81 MG EC tablet     90 tablet    Take 1 tablet (81 mg) by mouth daily    Acquired absence of knee joint       Doxycycline Hyclate 150 MG Tabs     60 tablet    Take 150 mg by mouth 2 times daily Resume once course of levaquin is completed    Left leg cellulitis       EFFEXOR  MG 24 hr capsule   Generic drug:  venlafaxine      1 CAPSULE BY MOUTH DAILY WITH FOOD        LANsoprazole 15 MG CR capsule    PREVACID    60 capsule    Take 30-60 minutes before a meal. Take 1 2 times per day for 2 weeks then decrease to 1 pill daily    Gastroesophageal reflux disease without esophagitis       LORazepam 0.5 MG tablet    ATIVAN    24 tablet    Take 1 tablet (0.5 mg) by mouth every 8 hours as needed for anxiety    DORON (generalized anxiety disorder)       LUMIGAN 0.01 % Soln   Generic drug:  bimatoprost      Place 1 drop into both eyes At Bedtime.        MELATONIN PO      Take 3 mg by mouth nightly as needed        MIRTAZAPINE PO      Take 30 mg by mouth At Bedtime        MULTI-VITAMIN PO       Take 1 tablet by mouth daily.        nystatin-triamcinolone ointment    MYCOLOG    30 g    APPLY TO AFFECTED AREA TWO TIMES A DAY    Dermatitis       order for DME     1 Units    Equipment being ordered: Wheelchair with back and seat cushion    Acquired absence of knee joint       order for DME     1 Units    Equipment being ordered: orthopedic shoes with riser for limb length disrepancy from    Acquired leg length discrepancy, Joint prosthesis infection or inflammation, sequela       penicillin V potassium 500 MG tablet    VEETID     Take 500 mg by mouth 2 times daily        PROBIOTIC DAILY PO      Take by mouth daily        ranitidine 150 MG tablet    ZANTAC    270 tablet    Take 1 tablet (150 mg) by mouth 2 times daily May have an additional third dose after lunch as needed    Functional dyspepsia, Gastroesophageal reflux disease without esophagitis       sodium chloride 1 GM tablet     90 tablet    TAKE ONE TABLET BY MOUTH ON MONDAY, WEDNESDAY, AND FRIDAY. TAKE DAILY IF SODIUM IS OUT OF RANGE    Hyponatremia       STOOL SOFTENER PO      Take 100 mg by mouth daily.        traZODone 50 MG tablet    DESYREL     Take by mouth At Bedtime 1 and 1/2 to 2 tabs        triamcinolone 0.1 % cream    KENALOG    80 g    Apply sparingly to affected area three times daily as needed    Dry skin dermatitis

## 2017-10-12 LAB
ALBUMIN SERPL-MCNC: 3.5 G/DL (ref 3.4–5)
ALP SERPL-CCNC: 104 U/L (ref 40–150)
ALT SERPL W P-5'-P-CCNC: 20 U/L (ref 0–50)
ANION GAP SERPL CALCULATED.3IONS-SCNC: 7 MMOL/L (ref 3–14)
AST SERPL W P-5'-P-CCNC: 15 U/L (ref 0–45)
BILIRUB SERPL-MCNC: 0.2 MG/DL (ref 0.2–1.3)
BUN SERPL-MCNC: 16 MG/DL (ref 7–30)
CALCIUM SERPL-MCNC: 8.5 MG/DL (ref 8.5–10.1)
CHLORIDE SERPL-SCNC: 91 MMOL/L (ref 94–109)
CO2 SERPL-SCNC: 27 MMOL/L (ref 20–32)
CREAT SERPL-MCNC: 0.58 MG/DL (ref 0.52–1.04)
GFR SERPL CREATININE-BSD FRML MDRD: >90 ML/MIN/1.7M2
GLUCOSE SERPL-MCNC: 102 MG/DL (ref 70–99)
POTASSIUM SERPL-SCNC: 4.5 MMOL/L (ref 3.4–5.3)
PROT SERPL-MCNC: 6.6 G/DL (ref 6.8–8.8)
SODIUM SERPL-SCNC: 125 MMOL/L (ref 133–144)
TSH SERPL DL<=0.005 MIU/L-ACNC: 1.55 MU/L (ref 0.4–4)

## 2017-10-12 ASSESSMENT — ANXIETY QUESTIONNAIRES: GAD7 TOTAL SCORE: 3

## 2017-10-25 ENCOUNTER — TELEPHONE (OUTPATIENT)
Dept: FAMILY MEDICINE | Facility: CLINIC | Age: 82
End: 2017-10-25

## 2017-10-25 NOTE — TELEPHONE ENCOUNTER
Patient would like the results of her blood work and when the results are done she would like it sent to her please.    Patient also would like PT for her legs.    Ana Blanco [place]

## 2017-10-25 NOTE — TELEPHONE ENCOUNTER
Patient would also like a phone call from Dr. Cohen if she has time to call her.    Ana Blanco [facility]

## 2017-11-04 ENCOUNTER — COMMUNICATION - HEALTHEAST (OUTPATIENT)
Dept: INFECTIOUS DISEASES | Facility: CLINIC | Age: 82
End: 2017-11-04

## 2017-11-04 DIAGNOSIS — Z96.659 INFECTED PROSTHETIC KNEE JOINT (H): ICD-10-CM

## 2017-11-04 DIAGNOSIS — T84.59XA INFECTED PROSTHETIC KNEE JOINT (H): ICD-10-CM

## 2017-11-06 ENCOUNTER — COMMUNICATION - HEALTHEAST (OUTPATIENT)
Dept: INFECTIOUS DISEASES | Facility: CLINIC | Age: 82
End: 2017-11-06

## 2017-11-06 DIAGNOSIS — Z96.659 INFECTED PROSTHETIC KNEE JOINT (H): ICD-10-CM

## 2017-11-06 DIAGNOSIS — T84.59XA INFECTED PROSTHETIC KNEE JOINT (H): ICD-10-CM

## 2017-11-14 ENCOUNTER — AMBULATORY - HEALTHEAST (OUTPATIENT)
Dept: CARDIOLOGY | Facility: CLINIC | Age: 82
End: 2017-11-14

## 2017-11-14 DIAGNOSIS — Z95.0 CARDIAC PACEMAKER IN SITU: ICD-10-CM

## 2017-11-14 LAB — HCC DEVICE COMMENTS: NORMAL

## 2017-11-14 ASSESSMENT — MIFFLIN-ST. JEOR: SCORE: 1083.6

## 2017-11-21 ENCOUNTER — OFFICE VISIT - HEALTHEAST (OUTPATIENT)
Dept: INFECTIOUS DISEASES | Facility: CLINIC | Age: 82
End: 2017-11-21

## 2017-11-21 ENCOUNTER — TRANSFERRED RECORDS (OUTPATIENT)
Dept: HEALTH INFORMATION MANAGEMENT | Facility: CLINIC | Age: 82
End: 2017-11-21

## 2017-11-21 ENCOUNTER — AMBULATORY - HEALTHEAST (OUTPATIENT)
Dept: LAB | Facility: CLINIC | Age: 82
End: 2017-11-21

## 2017-11-21 DIAGNOSIS — T84.59XS INFECTED PROSTHETIC KNEE JOINT, SEQUELA: ICD-10-CM

## 2017-11-21 DIAGNOSIS — Z96.659 INFECTED PROSTHETIC KNEE JOINT, SEQUELA: ICD-10-CM

## 2017-11-28 ENCOUNTER — COMMUNICATION - HEALTHEAST (OUTPATIENT)
Dept: INFECTIOUS DISEASES | Facility: CLINIC | Age: 82
End: 2017-11-28

## 2017-12-28 ENCOUNTER — TRANSFERRED RECORDS (OUTPATIENT)
Dept: HEALTH INFORMATION MANAGEMENT | Facility: CLINIC | Age: 82
End: 2017-12-28

## 2018-02-08 ENCOUNTER — AMBULATORY - HEALTHEAST (OUTPATIENT)
Dept: CARDIOLOGY | Facility: CLINIC | Age: 83
End: 2018-02-08

## 2018-02-08 DIAGNOSIS — Z95.0 CARDIAC PACEMAKER IN SITU: ICD-10-CM

## 2018-02-08 LAB — HCC DEVICE COMMENTS: NORMAL

## 2018-02-15 DIAGNOSIS — F33.9 RECURRENT MAJOR DEPRESSIVE DISORDER (H): ICD-10-CM

## 2018-02-15 DIAGNOSIS — F41.1 GENERALIZED ANXIETY DISORDER: Primary | ICD-10-CM

## 2018-02-15 DIAGNOSIS — E55.9 VITAMIN D DEFICIENCY: ICD-10-CM

## 2018-02-15 DIAGNOSIS — D50.8 OTHER IRON DEFICIENCY ANEMIA: ICD-10-CM

## 2018-02-15 DIAGNOSIS — Z13.220 SCREENING CHOLESTEROL LEVEL: ICD-10-CM

## 2018-02-15 LAB
BASOPHILS # BLD AUTO: 0 10E9/L (ref 0–0.2)
BASOPHILS NFR BLD AUTO: 0.6 %
DIFFERENTIAL METHOD BLD: NORMAL
EOSINOPHIL # BLD AUTO: 0.2 10E9/L (ref 0–0.7)
EOSINOPHIL NFR BLD AUTO: 4.6 %
ERYTHROCYTE [DISTWIDTH] IN BLOOD BY AUTOMATED COUNT: 12.8 % (ref 10–15)
HCT VFR BLD AUTO: 36.1 % (ref 35–47)
HGB BLD-MCNC: 12.2 G/DL (ref 11.7–15.7)
LYMPHOCYTES # BLD AUTO: 0.9 10E9/L (ref 0.8–5.3)
LYMPHOCYTES NFR BLD AUTO: 16.8 %
MCH RBC QN AUTO: 30.4 PG (ref 26.5–33)
MCHC RBC AUTO-ENTMCNC: 33.8 G/DL (ref 31.5–36.5)
MCV RBC AUTO: 90 FL (ref 78–100)
MONOCYTES # BLD AUTO: 0.5 10E9/L (ref 0–1.3)
MONOCYTES NFR BLD AUTO: 9.7 %
NEUTROPHILS # BLD AUTO: 3.5 10E9/L (ref 1.6–8.3)
NEUTROPHILS NFR BLD AUTO: 68.3 %
PLATELET # BLD AUTO: 328 10E9/L (ref 150–450)
RBC # BLD AUTO: 4.01 10E12/L (ref 3.8–5.2)
WBC # BLD AUTO: 5.1 10E9/L (ref 4–11)

## 2018-02-15 PROCEDURE — 82607 VITAMIN B-12: CPT | Performed by: PSYCHIATRY & NEUROLOGY

## 2018-02-15 PROCEDURE — 80048 BASIC METABOLIC PNL TOTAL CA: CPT | Performed by: PSYCHIATRY & NEUROLOGY

## 2018-02-15 PROCEDURE — 36415 COLL VENOUS BLD VENIPUNCTURE: CPT | Performed by: PSYCHIATRY & NEUROLOGY

## 2018-02-15 PROCEDURE — 80076 HEPATIC FUNCTION PANEL: CPT | Performed by: PSYCHIATRY & NEUROLOGY

## 2018-02-15 PROCEDURE — 84443 ASSAY THYROID STIM HORMONE: CPT | Performed by: PSYCHIATRY & NEUROLOGY

## 2018-02-15 PROCEDURE — 82306 VITAMIN D 25 HYDROXY: CPT | Performed by: PSYCHIATRY & NEUROLOGY

## 2018-02-15 PROCEDURE — 85025 COMPLETE CBC W/AUTO DIFF WBC: CPT | Performed by: PSYCHIATRY & NEUROLOGY

## 2018-02-16 LAB
ALBUMIN SERPL-MCNC: 3.6 G/DL (ref 3.4–5)
ALP SERPL-CCNC: 108 U/L (ref 40–150)
ALT SERPL W P-5'-P-CCNC: 29 U/L (ref 0–50)
ANION GAP SERPL CALCULATED.3IONS-SCNC: 8 MMOL/L (ref 3–14)
AST SERPL W P-5'-P-CCNC: 21 U/L (ref 0–45)
BILIRUB DIRECT SERPL-MCNC: <0.1 MG/DL (ref 0–0.2)
BILIRUB SERPL-MCNC: 0.3 MG/DL (ref 0.2–1.3)
BUN SERPL-MCNC: 11 MG/DL (ref 7–30)
CALCIUM SERPL-MCNC: 8.6 MG/DL (ref 8.5–10.1)
CHLORIDE SERPL-SCNC: 92 MMOL/L (ref 94–109)
CO2 SERPL-SCNC: 25 MMOL/L (ref 20–32)
CREAT SERPL-MCNC: 0.49 MG/DL (ref 0.52–1.04)
DEPRECATED CALCIDIOL+CALCIFEROL SERPL-MC: 27 UG/L (ref 20–75)
GFR SERPL CREATININE-BSD FRML MDRD: >90 ML/MIN/1.7M2
GLUCOSE SERPL-MCNC: 93 MG/DL (ref 70–99)
POTASSIUM SERPL-SCNC: 4.4 MMOL/L (ref 3.4–5.3)
PROT SERPL-MCNC: 6.8 G/DL (ref 6.8–8.8)
SODIUM SERPL-SCNC: 125 MMOL/L (ref 133–144)
TSH SERPL DL<=0.005 MIU/L-ACNC: 1.1 MU/L (ref 0.4–4)
VIT B12 SERPL-MCNC: 628 PG/ML (ref 193–986)

## 2018-02-26 ENCOUNTER — TELEPHONE (OUTPATIENT)
Dept: FAMILY MEDICINE | Facility: CLINIC | Age: 83
End: 2018-02-26

## 2018-02-26 DIAGNOSIS — E87.1 HYPONATREMIA: Primary | ICD-10-CM

## 2018-02-26 NOTE — TELEPHONE ENCOUNTER
Reason for Call:  Other call back    Detailed comments: Would like to speak with RN.  No other information was given.  Please call and assess. Thank you..Lucia Edwards    Phone Number Patient can be reached at: Home number on file [phone] (home)    Best Time: any time    Can we leave a detailed message on this number? YES    Call taken on 2/26/2018 at 1:48 PM by Lucia Edwards

## 2018-02-26 NOTE — TELEPHONE ENCOUNTER
"Please advise in Dr. Cohen's absence. Arielle says that she has been going to Dr. Gross, \"[facility] doctor for psychiatry.\" Arielle said that her sodium lab result was 125 on 2/15/18 so she went from taking sodium tablet every other day to taking it every day.  She said that she  is seeing Dr. Gross again in 3 weeks. Arielle would like to know when she should have her sodium level checked again and is asking that a lab order be placed.   Bryant Raman RN    "

## 2018-02-27 NOTE — TELEPHONE ENCOUNTER
Yes can recheck that, order placed.  She also has not been seen here for 1 year, would recommend follow up.  Barbara Spears PA-C

## 2018-02-28 NOTE — TELEPHONE ENCOUNTER
Patient notified and will call to schedule after speaking to daughter about a ride.    Kusum Bowman RN

## 2018-03-01 DIAGNOSIS — E87.1 HYPONATREMIA: ICD-10-CM

## 2018-03-01 PROCEDURE — 36415 COLL VENOUS BLD VENIPUNCTURE: CPT | Performed by: PHYSICIAN ASSISTANT

## 2018-03-01 PROCEDURE — 80048 BASIC METABOLIC PNL TOTAL CA: CPT | Performed by: PHYSICIAN ASSISTANT

## 2018-03-02 LAB
ANION GAP SERPL CALCULATED.3IONS-SCNC: 7 MMOL/L (ref 3–14)
BUN SERPL-MCNC: 13 MG/DL (ref 7–30)
CALCIUM SERPL-MCNC: 8.5 MG/DL (ref 8.5–10.1)
CHLORIDE SERPL-SCNC: 93 MMOL/L (ref 94–109)
CO2 SERPL-SCNC: 27 MMOL/L (ref 20–32)
CREAT SERPL-MCNC: 0.55 MG/DL (ref 0.52–1.04)
GFR SERPL CREATININE-BSD FRML MDRD: >90 ML/MIN/1.7M2
GLUCOSE SERPL-MCNC: 99 MG/DL (ref 70–99)
POTASSIUM SERPL-SCNC: 4.4 MMOL/L (ref 3.4–5.3)
SODIUM SERPL-SCNC: 127 MMOL/L (ref 133–144)

## 2018-03-02 NOTE — TELEPHONE ENCOUNTER
Her sodium has increased and is close to her baseline. I recommend continue 1 g sodium tablet daily and recheck level in a few weeks.  Barbara Spears PA-C

## 2018-03-02 NOTE — TELEPHONE ENCOUNTER
Patient is taking sodium 1 gm per day for the past few weeks. She dsalts her food daily. She could not tell how much she is taking in though. Please clarify how much total gm/per day she should be taking. She will follow up with labs.  Loni Christianson RN

## 2018-03-02 NOTE — TELEPHONE ENCOUNTER
Please tell patient that the sodium lab is a little higher - from 125 to 127.   What is the dose of her sodium tablet? How much salt does she intake in a day?  Likely she can increase sodium intake by 1 gram daily and recheck next week.  Barbara Spears PA-C

## 2018-03-08 ENCOUNTER — COMMUNICATION - HEALTHEAST (OUTPATIENT)
Dept: INFECTIOUS DISEASES | Facility: CLINIC | Age: 83
End: 2018-03-08

## 2018-03-08 DIAGNOSIS — T84.59XA INFECTED PROSTHETIC KNEE JOINT (H): ICD-10-CM

## 2018-03-08 DIAGNOSIS — Z96.659 INFECTED PROSTHETIC KNEE JOINT (H): ICD-10-CM

## 2018-04-10 ENCOUNTER — OFFICE VISIT (OUTPATIENT)
Dept: FAMILY MEDICINE | Facility: CLINIC | Age: 83
End: 2018-04-10
Payer: MEDICARE

## 2018-04-10 VITALS
DIASTOLIC BLOOD PRESSURE: 98 MMHG | HEIGHT: 65 IN | TEMPERATURE: 98 F | BODY MASS INDEX: 24.93 KG/M2 | HEART RATE: 80 BPM | SYSTOLIC BLOOD PRESSURE: 156 MMHG | WEIGHT: 149.6 LBS

## 2018-04-10 DIAGNOSIS — I10 BENIGN ESSENTIAL HYPERTENSION: ICD-10-CM

## 2018-04-10 DIAGNOSIS — K21.9 GASTROESOPHAGEAL REFLUX DISEASE WITHOUT ESOPHAGITIS: Primary | ICD-10-CM

## 2018-04-10 DIAGNOSIS — E87.1 HYPONATREMIA: ICD-10-CM

## 2018-04-10 DIAGNOSIS — H34.9 RETINAL ARTERY OCCLUSION: ICD-10-CM

## 2018-04-10 DIAGNOSIS — L30.9 DERMATITIS: ICD-10-CM

## 2018-04-10 LAB
ANION GAP SERPL CALCULATED.3IONS-SCNC: 5 MMOL/L (ref 3–14)
BUN SERPL-MCNC: 13 MG/DL (ref 7–30)
CALCIUM SERPL-MCNC: 8.7 MG/DL (ref 8.5–10.1)
CHLORIDE SERPL-SCNC: 94 MMOL/L (ref 94–109)
CO2 SERPL-SCNC: 28 MMOL/L (ref 20–32)
CREAT SERPL-MCNC: 0.51 MG/DL (ref 0.52–1.04)
GFR SERPL CREATININE-BSD FRML MDRD: >90 ML/MIN/1.7M2
GLUCOSE SERPL-MCNC: 93 MG/DL (ref 70–99)
POTASSIUM SERPL-SCNC: 4.3 MMOL/L (ref 3.4–5.3)
SODIUM SERPL-SCNC: 127 MMOL/L (ref 133–144)

## 2018-04-10 PROCEDURE — 80048 BASIC METABOLIC PNL TOTAL CA: CPT | Performed by: PHYSICIAN ASSISTANT

## 2018-04-10 PROCEDURE — 99214 OFFICE O/P EST MOD 30 MIN: CPT | Performed by: FAMILY MEDICINE

## 2018-04-10 PROCEDURE — 36415 COLL VENOUS BLD VENIPUNCTURE: CPT | Performed by: PHYSICIAN ASSISTANT

## 2018-04-10 RX ORDER — NYSTATIN AND TRIAMCINOLONE ACETONIDE 100000; 1 [USP'U]/G; MG/G
OINTMENT TOPICAL
Qty: 30 G | Refills: 0 | Status: SHIPPED | OUTPATIENT
Start: 2018-04-10 | End: 2019-10-30

## 2018-04-10 RX ORDER — OMEPRAZOLE 40 MG/1
40 CAPSULE, DELAYED RELEASE ORAL DAILY
Qty: 90 CAPSULE | Refills: 3 | Status: SHIPPED | OUTPATIENT
Start: 2018-04-10 | End: 2019-10-30 | Stop reason: DRUGHIGH

## 2018-04-10 RX ORDER — LISINOPRIL 5 MG/1
5 TABLET ORAL DAILY
Qty: 90 TABLET | Refills: 1 | Status: SHIPPED | OUTPATIENT
Start: 2018-04-10 | End: 2018-10-04

## 2018-04-10 ASSESSMENT — ANXIETY QUESTIONNAIRES
5. BEING SO RESTLESS THAT IT IS HARD TO SIT STILL: NOT AT ALL
1. FEELING NERVOUS, ANXIOUS, OR ON EDGE: SEVERAL DAYS
2. NOT BEING ABLE TO STOP OR CONTROL WORRYING: SEVERAL DAYS
GAD7 TOTAL SCORE: 5
7. FEELING AFRAID AS IF SOMETHING AWFUL MIGHT HAPPEN: NOT AT ALL
6. BECOMING EASILY ANNOYED OR IRRITABLE: SEVERAL DAYS
3. WORRYING TOO MUCH ABOUT DIFFERENT THINGS: SEVERAL DAYS

## 2018-04-10 ASSESSMENT — PATIENT HEALTH QUESTIONNAIRE - PHQ9: 5. POOR APPETITE OR OVEREATING: SEVERAL DAYS

## 2018-04-10 NOTE — PROGRESS NOTES
SUBJECTIVE:                                                    Arielle Stallworth is a 86 year old female who presents to clinic today for the following health issues:        BP Readings from Last 6 Encounters:   04/10/18 (!) 156/98   10/11/17 (!) 146/94   07/24/17 143/87   02/15/17 138/84   07/20/16 117/71   04/29/16 132/86       Problem list and histories reviewed & adjusted, as indicated.  Additional history: blood pressure is higher   Was higher at the eye doctor also  Needs her sodium checked   She has had this low and has been taking the sodium tabs daily  Blood pressure running high had retinal artery occlusion       Patient Active Problem List   Diagnosis     Cardiac pacemaker in situ     Advanced directives, counseling/discussion     Hyponatremia     Intertrigo     Depression, major     Arthritis     Cellulitis of left leg     Sepsis (H)     [facility]     Anxiety     Anemia     Joint prosthesis infection or inflammation (H)     Lymphedema of left lower extremity     Hyponatremia     Screening cholesterol level     Past Surgical History:   Procedure Laterality Date     CARPAL TUNNEL RELEASE RT/LT Left 3/2015    and excision mucous cyst lt thumb     CYSTOCELE REPAIR       HERNIA REPAIR      Twice     HERNIA REPAIR  3/2010    Reduction of a strangulated incisional inguinal hernia with repair of multiple incisional hernias with mesh.      HYSTERECTOMY       HYSTERECTOMY RADICAL      2002     JOINT REPLACEMENT      Left x2 and rt x1     ORTHOPEDIC SURGERY      Bilat. knees       Social History   Substance Use Topics     Smoking status: Former Smoker     Packs/day: 0.50     Years: 30.00     Types: Cigarettes     Quit date: 1/1/1960     Smokeless tobacco: Never Used     Alcohol use Yes      Comment: Occasional     History reviewed. No pertinent family history.      Labs reviewed in EPIC    ROS:  Constitutional, HEENT, cardiovascular, pulmonary, gi and gu systems are negative, except as otherwise  noted.    OBJECTIVE:                                                    There were no vitals taken for this visit. There is no height or weight on file to calculate BMI.   GENERAL APPEARANCE: healthy, alert and no distress  NECK: no adenopathy, no asymmetry, masses, or scars and thyroid normal to palpation  RESP: lungs clear to auscultation - no rales, rhonchi or wheezes  CV: regular rates and rhythm, normal S1 S2, no S3 or S4 and no murmur, click or rub       ASSESSMENT/PLAN:                                                    1. Dermatitis  Need refill   - nystatin-triamcinolone (MYCOLOG) ointment; APPLY TO AFFECTED AREA TWO TIMES A DAY  Dispense: 30 g; Refill: 0    2. Gastroesophageal reflux disease without esophagitis    We had to change due to insurance she will call if she has return of symptoms and we can change to an alternate covered one  - omeprazole (PRILOSEC) 40 MG capsule; Take 1 capsule (40 mg) by mouth daily Take 30-60 minutes before a meal.  Dispense: 90 capsule; Refill: 3    3. Hyponatremia  Recheck now she has been taking her sodium  - **Basic metabolic panel FUTURE 1yr  - Sodium; Future    4. Benign essential hypertension  Blood pressure higher will add lisinopril recheck blood pressure and labs in 2 weeks   - lisinopril (PRINIVIL/ZESTRIL) 5 MG tablet; Take 1 tablet (5 mg) by mouth daily  Dispense: 90 tablet; Refill: 1    5. Retinal artery occlusion  Being treated by the retinal specialist      reports that she quit smoking about 58 years ago. Her smoking use included Cigarettes. She has a 15.00 pack-year smoking history. She has never used smokeless tobacco.          Constance Cohen M.D.  [facility]

## 2018-04-10 NOTE — NURSING NOTE
"Chief Complaint   Patient presents with     Recheck Medication       Initial BP (!) 156/98  Pulse 80  Temp 98  F (36.7  C) (Tympanic)  Ht 5' 5\" (1.651 m)  Wt 149 lb 9.6 oz (67.9 kg)  BMI 24.89 kg/m2 Estimated body mass index is 24.89 kg/(m^2) as calculated from the following:    Height as of this encounter: 5' 5\" (1.651 m).    Weight as of this encounter: 149 lb 9.6 oz (67.9 kg).  Medication Reconciliation: complete   Marilyn Edwards CMA    "

## 2018-04-10 NOTE — MR AVS SNAPSHOT
After Visit Summary   4/10/2018    Arielle Stallworth    MRN: [id number]           Patient Information     Date Of Birth          6/9/1931        Visit Information        Provider Department      4/10/2018 11:30 AM Constance Cohen MD [facility]        Today's Diagnoses     Gastroesophageal reflux disease without esophagitis    -  1    Dermatitis        Hyponatremia        Benign essential hypertension        Macular degeneration (senile) of retina          Care Instructions    Take the sodium every other day             Follow-ups after your visit        Future tests that were ordered for you today     Open Future Orders        Priority Expected Expires Ordered    INJ BEVACIZUMAB PER 10 MG Routine  4/10/2019 4/10/2018            Who to contact     Normal or non-critical lab and imaging results will be communicated to you by [employer], letter or phone within 4 business days after the clinic has received the results. If you do not hear from us within 7 days, please contact the clinic through [employer] or phone. If you have a critical or abnormal lab result, we will notify you by phone as soon as possible.  Submit refill requests through [employer] or call your pharmacy and they will forward the refill request to us. Please allow 3 business days for your refill to be completed.          If you need to speak with a  for additional information , please call: [phone]             Additional Information About Your Visit        [employer] Information     [employer] gives you secure access to your electronic health record. If you see a primary care provider, you can also send messages to your care team and make appointments. If you have questions, please call your primary care clinic.  If you do not have a primary care provider, please call [phone] and they will assist you.        Care EveryWhere ID     This is your Care EveryWhere ID. This could be used by other organizations to access  "your [facility] medical records  [id number]        Your Vitals Were     Pulse Temperature Height BMI (Body Mass Index)          80 98  F (36.7  C) (Tympanic) 5' 5\" (1.651 m) 24.89 kg/m2         Blood Pressure from Last 3 Encounters:   04/10/18 (!) 156/98   10/11/17 (!) 146/94   07/24/17 143/87    Weight from Last 3 Encounters:   04/10/18 149 lb 9.6 oz (67.9 kg)   10/11/17 149 lb (67.6 kg)   02/15/17 149 lb (67.6 kg)              We Performed the Following     **Basic metabolic panel FUTURE 1yr          Today's Medication Changes          These changes are accurate as of 4/10/18 12:37 PM.  If you have any questions, ask your nurse or doctor.               Start taking these medicines.        Dose/Directions    lisinopril 5 MG tablet   Commonly known as:  PRINIVIL/ZESTRIL   Used for:  Benign essential hypertension   Started by:  Constance Cohen MD        Dose:  5 mg   Take 1 tablet (5 mg) by mouth daily   Quantity:  90 tablet   Refills:  1       omeprazole 40 MG capsule   Commonly known as:  priLOSEC   Used for:  Gastroesophageal reflux disease without esophagitis   Started by:  Constance Cohen MD        Dose:  40 mg   Take 1 capsule (40 mg) by mouth daily Take 30-60 minutes before a meal.   Quantity:  90 capsule   Refills:  3         These medicines have changed or have updated prescriptions.        Dose/Directions    nystatin-triamcinolone ointment   Commonly known as:  MYCOLOG   This may have changed:  See the new instructions.   Used for:  Dermatitis   Changed by:  Constance Cohen MD        APPLY TO AFFECTED AREA TWO TIMES A DAY   Quantity:  30 g   Refills:  0         Stop taking these medicines if you haven't already. Please contact your care team if you have questions.     LANsoprazole 15 MG CR capsule   Commonly known as:  PREVACID   Stopped by:  Constance Cohen MD                Where to get your medicines      These medications were sent to [facility] - [place], [place] - [address]  [address], [facility] [address]     Phone:  [phone]     lisinopril 5 MG tablet    nystatin-triamcinolone ointment    omeprazole 40 MG capsule                Primary Care Provider Office Phone # Fax #    Constance Cohen [phone] [address] [facility] [address]        Equal Access to Services     [facility]: Hadii aad ku hadasho Soomaali, waaxda luqadaha, qaybta kaalmada adeegyada, waxay idiin hayaan adeeg kharash la'aan . So [facility] [phone].    ATENCIÓN: Si habla español, tiene a earl disposición servicios gratuitos de asistencia lingüística. [facility] [phone].    We comply with applicable federal civil rights laws and [place] laws. We do not discriminate on the basis of race, color, national origin, age, disability, sex, sexual orientation, or gender identity.            Thank you!     Thank you for choosing [facility]  for your care. Our goal is always to provide you with excellent care. Hearing back from our patients is one way we can continue to improve our services. Please take a few minutes to complete the written survey that you may receive in the mail after your visit with us. Thank you!             Your Updated Medication List - Protect others around you: Learn how to safely use, store and throw away your medicines at www.disposemymeds.org.          This list is accurate as of 4/10/18 12:37 PM.  Always use your most recent med list.                   Brand Name Dispense Instructions for use Diagnosis    acetaminophen 650 MG 8 hour tablet     100 tablet    Take 650 mg by mouth every 4 hours as needed for mild pain    Left leg cellulitis       aspirin 81 MG EC tablet     90 tablet    Take 1 tablet (81 mg) by mouth daily    Acquired absence of knee joint       Doxycycline Hyclate 150 MG Tabs     60 tablet    Take 150 mg by mouth 2 times daily Resume once course of levaquin is completed    Left leg cellulitis       EFFEXOR XR  150 MG 24 hr capsule   Generic drug:  venlafaxine      1 CAPSULE BY MOUTH DAILY WITH FOOD        lisinopril 5 MG tablet    PRINIVIL/ZESTRIL    90 tablet    Take 1 tablet (5 mg) by mouth daily    Benign essential hypertension       LORazepam 0.5 MG tablet    ATIVAN    24 tablet    Take 1 tablet (0.5 mg) by mouth every 8 hours as needed for anxiety    DORON (generalized anxiety disorder)       LUMIGAN 0.01 % Soln   Generic drug:  bimatoprost      Place 1 drop into both eyes At Bedtime.        MELATONIN PO      Take 3 mg by mouth nightly as needed        MIRTAZAPINE PO      Take 15 mg by mouth At Bedtime        MULTI-VITAMIN PO      Take 1 tablet by mouth daily.        nystatin-triamcinolone ointment    MYCOLOG    30 g    APPLY TO AFFECTED AREA TWO TIMES A DAY    Dermatitis       omeprazole 40 MG capsule    priLOSEC    90 capsule    Take 1 capsule (40 mg) by mouth daily Take 30-60 minutes before a meal.    Gastroesophageal reflux disease without esophagitis       order for DME     1 Units    Equipment being ordered: Wheelchair with back and seat cushion    Acquired absence of knee joint       order for DME     1 Units    Equipment being ordered: orthopedic shoes with riser for limb length disrepancy from    Acquired leg length discrepancy, Joint prosthesis infection or inflammation, sequela       penicillin V potassium 500 MG tablet    VEETID     Take 500 mg by mouth 2 times daily        PROBIOTIC DAILY PO      Take by mouth daily        ranitidine 150 MG tablet    ZANTAC    270 tablet    Take 1 tablet (150 mg) by mouth 2 times daily May have an additional third dose after lunch as needed    Functional dyspepsia, Gastroesophageal reflux disease without esophagitis       sodium chloride 1 GM tablet     90 tablet    TAKE ONE TABLET BY MOUTH ON MONDAY, WEDNESDAY, AND FRIDAY. TAKE DAILY IF SODIUM IS OUT OF RANGE    Hyponatremia       STOOL SOFTENER PO      Take 100 mg by mouth daily.        VITAMIN D-1000 MAX ST 1000 UNITS  Tabs   Generic drug:  cholecalciferol      Take 1,000 Units by mouth

## 2018-04-11 ASSESSMENT — ANXIETY QUESTIONNAIRES: GAD7 TOTAL SCORE: 5

## 2018-04-11 ASSESSMENT — PATIENT HEALTH QUESTIONNAIRE - PHQ9: SUM OF ALL RESPONSES TO PHQ QUESTIONS 1-9: 3

## 2018-05-07 ENCOUNTER — TELEPHONE (OUTPATIENT)
Dept: FAMILY MEDICINE | Facility: CLINIC | Age: 83
End: 2018-05-07

## 2018-05-07 NOTE — TELEPHONE ENCOUNTER
Reason for call:  Patient reporting a symptom    Symptom or request: Daughter Terri left a message stating Loyda has had increase confusion since Friday. Arielle is refusing to go into the ER for this. Someone told Terri Dr. Cohen may have a 130pm appointment tomorrow available. They are wondering if they can take that appointment spot?    Duration (how long have symptoms been present): since friday    Have you been treated for this before? No    Additional comments:     Phone Number patient can be reached at:  Other phone number:  [phone]    Best Time:  any    Can we leave a detailed message on this number:  YES    Call taken on 5/7/2018 at 1:16 PM by Shira Yanes

## 2018-05-07 NOTE — TELEPHONE ENCOUNTER
"Tried calling  [phone] and vm said \"call cannot be completed as dialed.\" Tried it again and got the same message.   Bryant Raman RN     "

## 2018-05-08 ENCOUNTER — TRANSFERRED RECORDS (OUTPATIENT)
Dept: HEALTH INFORMATION MANAGEMENT | Facility: CLINIC | Age: 83
End: 2018-05-08

## 2018-05-08 ENCOUNTER — COMMUNICATION - HEALTHEAST (OUTPATIENT)
Dept: INFECTIOUS DISEASES | Facility: CLINIC | Age: 83
End: 2018-05-08

## 2018-05-08 NOTE — TELEPHONE ENCOUNTER
I just looked in care everywhere. Looks like psych increased her effexor which may have lowered her sodium even more. I think she may need to be off of the effexor as it may be lowering her sodium. Is she in the hospital?  Constance Cohen M.D.

## 2018-05-08 NOTE — TELEPHONE ENCOUNTER
"Tried calling again and dialed \"1\" as well--just like I did yesterday; and get the recording; \"call cannot be completed as dialed.\" Chart indicates that Arielle has an appointment for 5/11/18 with Dr. Cohen.  Bryant Raman, RN    "

## 2018-05-08 NOTE — TELEPHONE ENCOUNTER
"FYI to Dr. Cohen: Frances, daughter,  Reports \"the way she was acting yesterday; was beyond regular low sodium.really struggling with words; could not finish the sentence. My Mom refused to go to the ED yesterday.  My mother just called me and said, OK will you take me in.  She is really is struggling with words; admitted that she is scared. Advised Frances to bring her Mom to the ED. Frances agreed with plan.   Bryant Raman RN    "

## 2018-05-09 ENCOUNTER — TELEPHONE (OUTPATIENT)
Dept: FAMILY MEDICINE | Facility: CLINIC | Age: 83
End: 2018-05-09

## 2018-05-09 NOTE — TELEPHONE ENCOUNTER
See 5/7/18 telephone encounter for the update so far. Frances said that she will call back again with update on her Mom after they do rounds at [facility].  Bryant Raman RN

## 2018-05-09 NOTE — TELEPHONE ENCOUNTER
"FYI to Dr. Cohen: Frances reports that her Mom  went to [facility] ED yesterday. \" They kept her overnight. CT was fine, Urinalysis was fine , cxr--was fine;  Sodium was 124. At 2 AM today sodium was 130. At 6  AM was 128.\" Frances reports that her Mom only took increase of effexor  for 3 days and is now back to 150 mg. She did not like how it made her feel. She took the increase 3 days last week. Current symptoms started 5-7 says prior to trial of dosage increase. My cousin is on her way there now to the hospital to visit Mom. She is a nurse on that floor but she is not working today; so she is going to talk to the doctors and call me with update. I am on my way to [place] to take my  there for his annual check up. I will call and give you an update after the doctor does  Rounds with my Mom.\"  Bryant Raman RN    "

## 2018-05-09 NOTE — TELEPHONE ENCOUNTER
Reason for Call:  Other call back    Detailed comments: daughter Frances LEFT MESSAGE:    She'd like to speak with RN about Mom Arielle.  Please call and assess. Thank you..Lucia Edwards    Phone Number Patient can be reached at: Other phone number:  [phone]*    Can we leave a detailed message on this number? YES    Call taken on 5/9/2018 at 8:33 AM by Lucia Edwards

## 2018-05-10 ENCOUNTER — HOME CARE/HOSPICE - HEALTHEAST (OUTPATIENT)
Dept: HOME HEALTH SERVICES | Facility: HOME HEALTH | Age: 83
End: 2018-05-10

## 2018-05-11 ENCOUNTER — TELEPHONE (OUTPATIENT)
Dept: FAMILY MEDICINE | Facility: CLINIC | Age: 83
End: 2018-05-11

## 2018-05-11 NOTE — TELEPHONE ENCOUNTER
PAtient is still at [facility]. They are trying to discharge her with her sodium being so low and her confusion was said to be dementia. Sodium level was 129 when she was admitted. She was extremely confused and could not put sentences together. Another concern Frances has about her mom is that she takes 2 ativan daily. She has not had any since she was admitted. They again said that this was dementia. She does not know what to do. How do you advise?  Loni Christianson RN

## 2018-05-11 NOTE — TELEPHONE ENCOUNTER
Reason for Call:  Other call back    Detailed comments: Frances LEFT MESSAGE:  She has a few questions that she'd like to ask Dr. Cohen and was hoping she could get a call back from her.  Arielle still in hospital with sodium level of 125.  Please call and assess. Thank you..Lucia Edwards    Phone Number Patient can be reached at:   [phone]      Call taken on 5/11/2018 at 1:14 PM by Lucia Edwards

## 2018-05-16 ENCOUNTER — RECORDS - HEALTHEAST (OUTPATIENT)
Dept: LAB | Facility: CLINIC | Age: 83
End: 2018-05-16

## 2018-05-16 LAB — SODIUM SERPL-SCNC: 131 MMOL/L (ref 136–145)

## 2018-05-18 ENCOUNTER — OFFICE VISIT (OUTPATIENT)
Dept: FAMILY MEDICINE | Facility: CLINIC | Age: 83
End: 2018-05-18
Payer: MEDICARE

## 2018-05-18 VITALS — SYSTOLIC BLOOD PRESSURE: 138 MMHG | TEMPERATURE: 99 F | DIASTOLIC BLOOD PRESSURE: 77 MMHG | HEART RATE: 86 BPM

## 2018-05-18 DIAGNOSIS — H90.8 MIXED CONDUCTIVE AND SENSORINEURAL HEARING LOSS, UNSPECIFIED LATERALITY: ICD-10-CM

## 2018-05-18 DIAGNOSIS — E87.1 HYPONATREMIA: Primary | ICD-10-CM

## 2018-05-18 DIAGNOSIS — M21.70 ACQUIRED LEG LENGTH DISCREPANCY: ICD-10-CM

## 2018-05-18 DIAGNOSIS — E87.1 HYPONATREMIA: ICD-10-CM

## 2018-05-18 PROBLEM — Z13.220 SCREENING CHOLESTEROL LEVEL: Status: RESOLVED | Noted: 2018-02-15 | Resolved: 2018-05-18

## 2018-05-18 PROCEDURE — 99214 OFFICE O/P EST MOD 30 MIN: CPT | Performed by: PHYSICIAN ASSISTANT

## 2018-05-18 PROCEDURE — 84295 ASSAY OF SERUM SODIUM: CPT | Performed by: FAMILY MEDICINE

## 2018-05-18 PROCEDURE — 36415 COLL VENOUS BLD VENIPUNCTURE: CPT | Performed by: FAMILY MEDICINE

## 2018-05-18 RX ORDER — TRAZODONE HYDROCHLORIDE 50 MG/1
TABLET, FILM COATED ORAL
COMMUNITY
Start: 2017-12-30 | End: 2018-05-18

## 2018-05-18 NOTE — PROGRESS NOTES
SUBJECTIVE:   Arielle Stallworth is a 86 year old female who presents to clinic today for the following health issues:      Hospital Follow-up Visit:    Hospital/Nursing Home/IP Rehab Facility: [facility]  Date of Admission: 5/8/18  Date of Discharge: 5/13/18  Reason(s) for Admission: Hyponatremia / Encephalopathy             Problems taking medications regularly:  None       Medication changes since discharge: None       Problems adhering to non-medication therapy:  None    Summary of hospitalization:  [facility] discharge summary reviewed  Diagnostic Tests/Treatments reviewed.  Follow up needed: labs - sodium  Other Healthcare Providers Involved in Patient s Care:         None  Update since discharge: improved.     Post Discharge Medication Reconciliation: discharge medications reconciled, continue medications without change.  Plan of care communicated with patient and family     Coding guidelines for this visit:  Type of Medical   Decision Making Face-to-Face Visit       within 7 Days of discharge Face-to-Face Visit        within 14 days of discharge   Moderate Complexity [id number]   High Complexity [id number]          She is in with Frances Portillo, daughter  She has been at [facility] a year in apartment. She is being discharged today from TCU back to her apartment. Needs orders for home physical therapy/OT    No signs of illness - fevers, SOB, CP, GI symptoms, urinary symptoms . She is eating/drinking and urinating normally. Fluid restriction 1L  She has noticed some hearing loss and is ready for talking about hearing aids  She defers hearing exam today    effexor may be a cause - this was increased for 3 days then back down to 150 mg  Sees psychiatry in 2 weeks    From care everywhere:  Hospital Summary:     Arielle Stallworth is a 86 y.o. old female with a past medical history of hyponatremia, previous knee replacement with complication requiring removal of hardware and acquired leg  length discrepancy, cardiac pacemaker placement, depression, anxiety and uterine cancer who presents to the ED with AMS. Found to be hyponatremic to 124, and was admitted for further evaluation and treatment.      Hyponatremia / SIADH: Na 124 on admission, 127 this AM. Baseline Na appears to be close to 127 with past several clinic visits but has been as low as 116 in previous hospitalizations. Urine sodium 54 and urine osmo 458 strongly suggesting SIADH. Possibly from psych medications. Acute drop thought to be secondary to noncompliance with sodium tablets at home. Some improvement seen with fluid bolus, so possible patient was slightly hypovolemic as well. Likely reset osmo state and and has not returned to normal sodium in quite some time.   - Increased Na tablets to 2g BID. Continue this regimen. Could consider further titration.   - Continue 1L fluid restriction   - Recheck Na at TCU in 3 days.     Encephalopathy: Possibly secondary to hyponatremia as above, however patient's family has seen a progressive decline over the past 6 months becoming more socially withdrawn and forgetful. Slums score of 14 on 5/10/18, which would suggest likely dementia, especially given slow decline over months. Family feels that her encephalopathy is all secondary to her sodium levels, though feel that fluctuation in her mentation is more likely secondary to her possible underlying dementia.   - OT recommending medication assistance, assist with finances, home OT, supervision of cooking, and could benefit from higher level of care   - PT recommending home PT  - Recommend further testing for possible dementia as an outpatient  - Discharge to TCU     Discharge Planning: Patient lives at [facility] in independent living with her  who suffers from Lewy body dementia.  Family has concerns about patient's ability to care for herself and , however patient refusing additional help at this time.  - OT recommending medication  assistance, assist with finances, home OT, supervision of cooking, and could benefit from higher level of care   - Discharging to TCU for further therapies     Chronic  HTN - Continue home Lisinopril 5mg daily   Anxiety and depression - Continue home Effexor, Mirtazapine. These may be contributing to patient's SIADH. Schedule lorazepam 0.5 mg po BID, hold for sedation.   GERD - Continue home omeprazole 40mg daily   Risk for infections from knee: Does follow with infectious disease every 6 months given complicated skin infections, cellulitis, and sepsis requiring hospitalization several times from previous TKA.  Will continue home antibiotic regimen or Doxycycline 100mg BID and Penicillin 500mg BID  History of cardiac pacemaker        Discharge Instructions:  Follow up appointment with Primary Care Physician: Constance Cohen MD  Follow up test / procedure to do as outpatient: Recheck Na on 5/16/18  Diet: fluid restriction 1L  Activity: activity as tolerated  The following tests have been done with results pending: none      Problem list and histories reviewed & adjusted, as indicated.  Additional history: as documented    Patient Active Problem List   Diagnosis     Cardiac pacemaker in situ     Advanced directives, counseling/discussion     Hyponatremia     Intertrigo     Depression, major     Arthritis     Cellulitis of left leg     Sepsis (H)     [facility]     Anxiety     Anemia     Infection and inflammatory reaction due to internal prosthetic device, implant, and graft     Lymphedema of left lower extremity     Hyponatremia     Acquired leg length discrepancy     Uterine cancer (H)     Lymphedema     Acute encephalopathy     Past Surgical History:   Procedure Laterality Date     CARPAL TUNNEL RELEASE RT/LT Left 3/2015    and excision mucous cyst lt thumb     CYSTOCELE REPAIR       HERNIA REPAIR      Twice     HERNIA REPAIR  3/2010    Reduction of a strangulated incisional inguinal hernia with repair of  multiple incisional hernias with mesh.      HYSTERECTOMY       HYSTERECTOMY RADICAL      2002     JOINT REPLACEMENT      Left x2 and rt x1     ORTHOPEDIC SURGERY      Bilat. knees       Social History   Substance Use Topics     Smoking status: Former Smoker     Packs/day: 0.50     Years: 30.00     Types: Cigarettes     Quit date: 1/1/1960     Smokeless tobacco: Never Used     Alcohol use Yes      Comment: Occasional     History reviewed. No pertinent family history.      Labs reviewed in EPIC    Reviewed and updated as needed this visit by clinical staff  Tobacco  Allergies  Meds  Problems  Med Hx  Surg Hx  Fam Hx  Soc Hx        Reviewed and updated as needed this visit by Provider  Tobacco  Allergies  Meds  Problems  Med Hx  Surg Hx  Fam Hx  Soc Hx          ROS:  Other than noted above, general, HEENT, respiratory, cardiac, MS, and gastrointestinal systems are negative.     OBJECTIVE:     /77  Pulse 86  Temp 99  F (37.2  C) (Tympanic)  There is no height or weight on file to calculate BMI.  GENERAL: healthy, alert and no distress  HENT: ear canals and TM's normal, nose and mouth without ulcers or lesions  NECK: no adenopathy, no asymmetry, masses, or scars and thyroid normal to palpation  RESP: lungs clear to auscultation - no rales, rhonchi or wheezes  CV: regular rate and rhythm, normal S1 S2, no S3 or S4, no murmur, click or rub, no peripheral edema and peripheral pulses strong  ABDOMEN: soft, nontender, no hepatosplenomegaly, no masses and bowel sounds normal  MS: no gross musculoskeletal defects noted, no edema    ASSESSMENT/PLAN:     ASSESSMENT/PLAN:      ICD-10-CM    1. Hyponatremia E87.1 HOME CARE NURSING REFERRAL   2. Acquired leg length discrepancy M21.70 HOME CARE NURSING REFERRAL   3. Mixed conductive and sensorineural hearing loss, unspecified laterality H90.8 AUDIOLOGY ADULT REFERRAL     She needs sodium rechecked, lab was drawn before visit.   She is much improved, per patient  "and daughter. per daughter \"178 degree difference\" from being in the hospital especially in terms of memory/dementia. She is agreeable to OT/PT. Patient refuses weight today - she has home wheelchair, heavy shoe for leg length on and denies any fluid retention.   She has questions about how long she'll be on the higher dose of     Patient Instructions   [facility]: [facility] [place] [phone]      We will be in touch about sodium levels    Barbara Spears PA-C  [facility]    "

## 2018-05-18 NOTE — MR AVS SNAPSHOT
After Visit Summary   5/18/2018    Arielle Mcguire    MRN: [id number]           Patient Information     Date Of Birth          6/9/1931        Visit Information        Provider Department      5/18/2018 3:00 PM Barbara Spears PA-C [facility]        Today's Diagnoses     Hyponatremia    -  1    Acquired leg length discrepancy        Mixed conductive and sensorineural hearing loss, unspecified laterality          Care Instructions    FMG: [facility] [phone]      We will be in touch about sodium levels            Follow-ups after your visit        Additional Services     AUDIOLOGY ADULT REFERRAL       Your provider has referred you to: [facility] [phone]   [email or web address]    Specialty Testing:  Audiogram w/Tymps and Reflexes (Comprehensive Audiology Evaluation)            HOME CARE NURSING REFERRAL       **Order classes of: [facility], [facility] and [facility] will route to the Home Care and Hospice Referral Pool.  Home Care or Hospice will then contact the patient to schedule their appointment.**    If you do not hear from Home Care and Hospice, or you would like to call to schedule, please call the referring place of service that your provider has listed below.  ______________________________________________________________________    Your provider has referred you to: FMG: [facility] [phone]   [email or web address]    Extended Emergency Contact Information  Primary Emergency Contact: WENDIE MCGUIRE  Address: [address] [place] [address] [facility]  Home Phone: [phone]  Relation: Spouse  Secondary Emergency Contact: AIDEN GARCIA  Address: [address] [facility]  Home Phone: [phone]  Mobile Phone:  [phone]  Relation: Daughter    Patient Anticipated Discharge Date: discharge from hospital 5/13/18, discharge from TCU 5/18/18   RN, PT, HHA to begin 24 - 48 hours after discharge.  PLEASE EVALUATE AND TREAT (Evaluation timeline is 24 - 48 hrs. Please call if there is need for a variance to this timeline).    REASON FOR REFERRAL: Assessment & Treatment: PT and OT     ADDITIONAL SERVICES NEEDED: OT    OTHER PERTINENT INFORMATION: Patient was last seen by provider on 5/18/18 for hospital follow up .    Current Outpatient Prescriptions:  aspirin 81 MG EC tablet, Take 1 tablet (81 mg) by mouth daily, Disp: 90 tablet, Rfl: 3  bimatoprost (LUMIGAN) 0.01 % SOLN, Place 1 drop into both eyes At Bedtime., Disp: , Rfl:   cholecalciferol (VITAMIN D-1000 MAX ST) 1000 UNITS TABS, Take 1,000 Units by mouth, Disp: , Rfl:   Docusate Calcium (STOOL SOFTENER PO), Take 100 mg by mouth daily., Disp: , Rfl:   Doxycycline Hyclate 150 MG TABS, Take 150 mg by mouth 2 times daily Resume once course of levaquin is completed, Disp: 60 tablet, Rfl:   EFFEXOR XR# 150 MG OR CP24, 1 CAPSULE BY MOUTH DAILY WITH FOOD, Disp: , Rfl:   lisinopril (PRINIVIL/ZESTRIL) 5 MG tablet, Take 1 tablet (5 mg) by mouth daily, Disp: 90 tablet, Rfl: 1  MELATONIN PO, Take 3 mg by mouth nightly as needed, Disp: , Rfl:   MIRTAZAPINE PO, Take 15 mg by mouth At Bedtime , Disp: , Rfl:   Multiple Vitamin (MULTI-VITAMIN PO), Take 1 tablet by mouth daily., Disp: , Rfl:   nystatin-triamcinolone (MYCOLOG) ointment, APPLY TO AFFECTED AREA TWO TIMES A DAY, Disp: 30 g, Rfl: 0  omeprazole (PRILOSEC) 40 MG capsule, Take 1 capsule (40 mg) by mouth daily Take 30-60 minutes before a meal., Disp: 90 capsule, Rfl: 3  ORDER FOR DME, Equipment being ordered: Wheelchair with back and seat cushion, Disp: 1 Units, Rfl: 0  order for DME, Equipment being ordered: orthopedic shoes with riser for limb length disrepancy from, Disp: 1 Units, Rfl: 0  penicillin V potassium (VEETID) 500 MG  tablet, Take 500 mg by mouth 2 times daily, Disp: , Rfl:   Probiotic Product (PROBIOTIC DAILY PO), Take by mouth daily, Disp: , Rfl:   sodium chloride 1 GM tablet, TAKE ONE TABLET BY MOUTH ON MONDAY, WEDNESDAY, AND FRIDAY. 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    I certify that, based on my findings, the following services are medically necessary Home Health Services: Occupational Therapy and Physical Therapy    My clinical findings support the need for the above services because: Occupational Therapy Services are needed to assess and treat cognitive ability and address ADL safety due to  impairment in weakness, dementia. and Physical Therapy Services are needed to assess and treat the following functional impairments: dementia, weakness.    Further, I certify that my clinical findings support that this patient is homebound (i.e. absences from home require considerable and taxing effort and are for medical reasons or [employer] services or infrequently or of short duration when for other reasons) because: Leaving home is medically contraindicated for the following reason(s):  ,and Requires assistance of another person or specialized equipment to access medical services because patient:  Needs wheelchair.    Based on the above findings, I certify that this patient is confined to the home and needs intermittent skilled nursing care, physical therapy and/or speech therapy.  The patient is under my care, and I have initiated the establishment of the plan of care.  This patient will be followed by a physician who will periodically review the plan of care.    Physician/Provider to provide follow up care: Constance Cohen certified Physician at time of discharge: Barbara Spears PA-C, Constance Cohen MD    Please be aware that coverage of these services is subject to the terms and limitations of your health insurance plan.  Call member services at your health plan with any benefit or coverage questions.                  Who to contact     Normal or non-critical lab and imaging results will be communicated to you by MyChart, letter or phone within 4 business days after the clinic has received the results. If you do not hear from us within 7 days, please contact the clinic through MyChart or phone. If you have a critical or abnormal lab result, we will notify you by phone as soon as possible.  Submit refill requests through [employer] or call your pharmacy and they will forward the refill request to us. Please allow 3 business days for your refill to be completed.          If you need to  speak with a  for additional information , please call: [phone]             Additional Information About Your Visit        Zytoprotechart Information     [employer] gives you secure access to your electronic health record. If you see a primary care provider, you can also send messages to your care team and make appointments. If you have questions, please call your primary care clinic.  If you do not have a primary care provider, please call [phone] and they will assist you.        Care EveryWhere ID     This is your Care EveryWhere ID. This could be used by other organizations to access your [facility] medical records  [id number]        Your Vitals Were     Pulse Temperature                86 99  F (37.2  C) (Tympanic)           Blood Pressure from Last 3 Encounters:   05/18/18 138/77   04/10/18 (!) 156/98   10/11/17 (!) 146/94    Weight from Last 3 Encounters:   04/10/18 149 lb 9.6 oz (67.9 kg)   10/11/17 149 lb (67.6 kg)   02/15/17 149 lb (67.6 kg)              We Performed the Following     AUDIOLOGY ADULT REFERRAL     HOME CARE NURSING REFERRAL        Primary Care Provider Office Phone # Fax #    Constance Cohen [phone] [address] [facility] [address]        Equal Access to Services     MICHAEL CENTENO : Hadii aad ku hadasho Soomaali, waaxda luqadaha, qaybta kaalmada adeegyada, waxay idiin hayjosuén kassie boss lajules yin. So [facility] [phone].    ATENCIÓN: Si habla español, tiene a earl disposición servicios gratuitos de asistencia lingüística. Llame al [phone].    We comply with applicable federal civil rights laws and [place] laws. We do not discriminate on the basis of race, color, national origin, age, disability, sex, sexual orientation, or gender identity.            Thank you!     Thank you for choosing [facility]  for your care. Our goal is always to provide you with excellent care. Hearing back from our patients is one way we can continue to  improve our services. Please take a few minutes to complete the written survey that you may receive in the mail after your visit with us. Thank you!             Your Updated Medication List - Protect others around you: Learn how to safely use, store and throw away your medicines at www.disposemymeds.org.          This list is accurate as of 5/18/18  3:49 PM.  Always use your most recent med list.                   Brand Name Dispense Instructions for use Diagnosis    acetaminophen 650 MG 8 hour tablet     100 tablet    Take 650 mg by mouth every 4 hours as needed for mild pain    Left leg cellulitis       aspirin 81 MG EC tablet     90 tablet    Take 1 tablet (81 mg) by mouth daily    Acquired absence of knee joint       Doxycycline Hyclate 150 MG Tabs     60 tablet    Take 150 mg by mouth 2 times daily Resume once course of levaquin is completed    Left leg cellulitis       EFFEXOR  MG 24 hr capsule   Generic drug:  venlafaxine      1 CAPSULE BY MOUTH DAILY WITH FOOD        lisinopril 5 MG tablet    PRINIVIL/ZESTRIL    90 tablet    Take 1 tablet (5 mg) by mouth daily    Benign essential hypertension       LORazepam 0.5 MG tablet    ATIVAN    24 tablet    Take 1 tablet (0.5 mg) by mouth every 8 hours as needed for anxiety    DORON (generalized anxiety disorder)       LUMIGAN 0.01 % Soln   Generic drug:  bimatoprost      Place 1 drop into both eyes At Bedtime.        MELATONIN PO      Take 3 mg by mouth nightly as needed        MIRTAZAPINE PO      Take 15 mg by mouth At Bedtime        MULTI-VITAMIN PO      Take 1 tablet by mouth daily.        nystatin-triamcinolone ointment    MYCOLOG    30 g    APPLY TO AFFECTED AREA TWO TIMES A DAY    Dermatitis       omeprazole 40 MG capsule    priLOSEC    90 capsule    Take 1 capsule (40 mg) by mouth daily Take 30-60 minutes before a meal.    Gastroesophageal reflux disease without esophagitis       order for DME     1 Units    Equipment being ordered: Wheelchair with back  and seat cushion    Acquired absence of knee joint       order for DME     1 Units    Equipment being ordered: orthopedic shoes with riser for limb length disrepancy from    Acquired leg length discrepancy, Joint prosthesis infection or inflammation, sequela       penicillin V potassium 500 MG tablet    VEETID     Take 500 mg by mouth 2 times daily        PROBIOTIC DAILY PO      Take by mouth daily        sodium chloride 1 GM tablet     90 tablet    TAKE ONE TABLET BY MOUTH ON MONDAY, WEDNESDAY, AND FRIDAY. TAKE DAILY IF SODIUM IS OUT OF RANGE    Hyponatremia       STOOL SOFTENER PO      Take 100 mg by mouth daily.        VITAMIN D-1000 MAX ST 1000 units Tabs   Generic drug:  cholecalciferol      Take 1,000 Units by mouth

## 2018-05-18 NOTE — PATIENT INSTRUCTIONS
FMG: [facility] [phone]      We will be in touch about sodium levels

## 2018-05-19 ENCOUNTER — NURSE TRIAGE (OUTPATIENT)
Dept: NURSING | Facility: CLINIC | Age: 83
End: 2018-05-19

## 2018-05-19 LAB — SODIUM SERPL-SCNC: 130 MMOL/L (ref 133–144)

## 2018-05-19 NOTE — TELEPHONE ENCOUNTER
Regarding: Vipin Portillo is caling for results regarding sodium levels  ----- Message from Angeles Esteban sent at 5/19/2018  5:24 PM CDT -----  Reason for Call:  Request for results:    Name of test or procedure: Sodium levels.    Date of test of procedure: 05/18    Location of the test or procedure: [place]    OK to leave the result message on voice mail or with a family member? YES , vipin steele is the one requesting for results.    Phone number Patient can be reached at:  Other phone number:  [phone]#    Additional comments: Per vipin Steele, mother just got release form hospital not to long ago and had labs done. She is looking for results of sodium levels.    Call taken on 5/19/2018 at 5:22 PM by Angeles Esteban

## 2018-05-19 NOTE — TELEPHONE ENCOUNTER
"Daughter Frances calling for lab results. Reports \"my mom was recently in the hospital and she was discharged, she had her sodium checked yesterday due to hyponatremia, I am calling for the results.\" CTC on file dated 7/18/12. Sodium results discussed with caller (Sodium 130 on 5/18/18). Reports patient is currently at a senior living facility and caller plans to speak to PCP on Monday about the results. Caller had no further questions.    Encouraged call back to [facility] 24/7 for nurse line services, new/worsening symptoms or further questions.    Lori Matos RN  [facility]    Reason for Disposition    Caller requesting lab results    Additional Information    Negative: Lab calling with strep throat test results and triager can call in prescription    Negative: Lab calling with urinalysis test results and triager can call in prescription    Negative: Medication questions    Negative: ED call to PCP    Negative: Physician call to PCP    Negative: Call about patient who is currently hospitalized    Negative: Lab or radiology calling with CRITICAL test results    Negative: [1] Prescription not at pharmacy AND [2] was prescribed today by PCP    Negative: [1] Follow-up call from patient regarding patient's clinical status AND [2] information urgent    Negative: [1] Caller requests to speak ONLY to PCP AND [2] URGENT question    Negative: [1] Caller requests to speak to PCP now AND [2] won't tell us reason for call  (Exception: if 10 pm to 6 am, caller must first discuss reason for the call)    Negative: Notification of hospital admission    Negative: Notification of death    Protocols used: PCP CALL - NO TRIAGE-ADULT-    "

## 2018-05-21 ENCOUNTER — TELEPHONE (OUTPATIENT)
Dept: FAMILY MEDICINE | Facility: CLINIC | Age: 83
End: 2018-05-21

## 2018-05-21 DIAGNOSIS — E87.1 HYPONATREMIA: Primary | ICD-10-CM

## 2018-05-21 RX ORDER — VENLAFAXINE HYDROCHLORIDE 37.5 MG/1
CAPSULE, EXTENDED RELEASE ORAL
Qty: 90 CAPSULE | Refills: 1 | Status: SHIPPED | OUTPATIENT
Start: 2018-05-21 | End: 2018-07-25 | Stop reason: ALTCHOICE

## 2018-05-21 NOTE — TELEPHONE ENCOUNTER
I just reviewed her d/c summary. I want to wean her off of her effexor so lets strt now I am sending in he 37.5 capsules she can take 3 per day and do that for 2 weeks then we will go down to 75 mg capsule for 2 weeks then 37.5 mg for 2 weeks . Constance Cohen M.D.'

## 2018-05-21 NOTE — TELEPHONE ENCOUNTER
To: BRIAN العراقي TRIAGE POOL      From: Pilar Portillo      Created: 5/19/2018 5:58 PM        *-*-*This message has not been handled.*-*-*    In regard to my mom, Arielle Stallworth. Her sodium dropped from 131 on Wednesday 5/16 to 130 on Friday 5/18. I know it's an ok lo evel right now but am concerned it's dropping with 2gm sodium 2x daily. I am making an appointment with you early next week.

## 2018-05-21 NOTE — TELEPHONE ENCOUNTER
Note below was copied from daughter, Pilar's Mychart. Advised Pilar that Arielle's Mychart communication needs to be on Arielle's chart only.  Bryant Raman RN

## 2018-05-21 NOTE — TELEPHONE ENCOUNTER
Patient and daughter, Frances, notified. They agreed with plan. Reminded both of Arielle's appointment tomorrow with Dr. Cohen.  Braynt Raman RN

## 2018-05-21 NOTE — TELEPHONE ENCOUNTER
Dear Dr. Cohen,   Medicare Home Health regulations requires [facility] to provide an initial assessment visit either within the 48 hours of the patient s return home, or on the physician ordered Start of Care date.  There will be a delay in the Initial Assessment for Arielle Stallworth MR [id number]  We anticipate the Start of Care will be 5/22/18  Sincerely [facility]   Mari Mathew RN,   [phone]

## 2018-05-22 ENCOUNTER — OFFICE VISIT (OUTPATIENT)
Dept: FAMILY MEDICINE | Facility: CLINIC | Age: 83
End: 2018-05-22
Payer: MEDICARE

## 2018-05-22 ENCOUNTER — DOCUMENTATION ONLY (OUTPATIENT)
Dept: LAB | Facility: CLINIC | Age: 83
End: 2018-05-22

## 2018-05-22 VITALS
SYSTOLIC BLOOD PRESSURE: 136 MMHG | BODY MASS INDEX: 24.28 KG/M2 | HEIGHT: 65 IN | HEART RATE: 88 BPM | TEMPERATURE: 98.4 F | WEIGHT: 145.7 LBS | DIASTOLIC BLOOD PRESSURE: 82 MMHG

## 2018-05-22 DIAGNOSIS — E87.1 HYPONATREMIA: Primary | ICD-10-CM

## 2018-05-22 DIAGNOSIS — E87.1 HYPONATREMIA: ICD-10-CM

## 2018-05-22 LAB — SODIUM SERPL-SCNC: 130 MMOL/L (ref 133–144)

## 2018-05-22 PROCEDURE — 36415 COLL VENOUS BLD VENIPUNCTURE: CPT | Performed by: FAMILY MEDICINE

## 2018-05-22 PROCEDURE — 84295 ASSAY OF SERUM SODIUM: CPT | Performed by: FAMILY MEDICINE

## 2018-05-22 PROCEDURE — 99213 OFFICE O/P EST LOW 20 MIN: CPT | Performed by: FAMILY MEDICINE

## 2018-05-22 NOTE — MR AVS SNAPSHOT
"              After Visit Summary   5/22/2018    Arielle Stallworth    MRN: [id number]           Patient Information     Date Of Birth          6/9/1931        Visit Information        Provider Department      5/22/2018 3:30 PM Constance Cohen MD [facility] Diagnoses     Hyponatremia           Follow-ups after your visit        Follow-up notes from your care team     Return in about 2 weeks (around 6/5/2018) for Lab Work.      Who to contact     Normal or non-critical lab and imaging results will be communicated to you by [facility], letter or phone within 4 business days after the clinic has received the results. If you do not hear from us within 7 days, please contact the clinic through [facility] or phone. If you have a critical or abnormal lab result, we will notify you by phone as soon as possible.  Submit refill requests through [employer] or call your pharmacy and they will forward the refill request to us. Please allow 3 business days for your refill to be completed.          If you need to speak with a  for additional information , please call: [phone]             Additional Information About Your Visit        MyChart Information     [employer] gives you secure access to your electronic health record. If you see a primary care provider, you can also send messages to your care team and make appointments. If you have questions, please call your primary care clinic.  If you do not have a primary care provider, please call [phone] and they will assist you.        Care EveryWhere ID     This is your Care EveryWhere ID. This could be used by other organizations to access your [facility] medical records  [id number]        Your Vitals Were     Pulse Temperature Height BMI (Body Mass Index)          88 98.4  F (36.9  C) (Tympanic) 5' 5\" (1.651 m) 24.25 kg/m2         Blood Pressure from Last 3 Encounters:   05/22/18 136/82   05/18/18 138/77   04/10/18 (!) 156/98    Weight from " Last 3 Encounters:   05/22/18 145 lb 11.2 oz (66.1 kg)   04/10/18 149 lb 9.6 oz (67.9 kg)   10/11/17 149 lb (67.6 kg)              We Performed the Following     Sodium        Primary Care Provider Office Phone # Fax #    Constance Cohen [phone] [address] [facility] [place] [address]        Equal Access to Services     EMANUEL CENTENO : Hadii aad ku hadasho Soomaali, waaxda luqadaha, qaybta kaalmada adeegyada, waxay idiin hayaan adeeg kharash la'deven . So [facility] [phone].    ATENCIÓN: Si williela arlette, tiene a earl disposición servicios gratuitos de asistencia lingüística. Llame al [phone].    We comply with applicable federal civil rights laws and [place] laws. We do not discriminate on the basis of race, color, national origin, age, disability, sex, sexual orientation, or gender identity.            Thank you!     Thank you for choosing [facility]  for your care. Our goal is always to provide you with excellent care. Hearing back from our patients is one way we can continue to improve our services. Please take a few minutes to complete the written survey that you may receive in the mail after your visit with us. Thank you!             Your Updated Medication List - Protect others around you: Learn how to safely use, store and throw away your medicines at www.disposemymeds.org.          This list is accurate as of 5/22/18  5:42 PM.  Always use your most recent med list.                   Brand Name Dispense Instructions for use Diagnosis    acetaminophen 650 MG 8 hour tablet     100 tablet    Take 650 mg by mouth every 4 hours as needed for mild pain    Left leg cellulitis       aspirin 81 MG EC tablet     90 tablet    Take 1 tablet (81 mg) by mouth daily    Acquired absence of knee joint       Doxycycline Hyclate 150 MG Tabs     60 tablet    Take 150 mg by mouth 2 times daily Resume once course of levaquin is completed    Left leg cellulitis       * EFFEXOR  MG 24 hr  capsule   Generic drug:  venlafaxine      1 CAPSULE BY MOUTH DAILY WITH FOOD        * venlafaxine 37.5 MG 24 hr capsule    EFFEXOR-XR    90 capsule    3 capsules for 2 weeks then 2 capsules for 2 weeks then 1 capsule for 2 weeks    Hyponatremia       lisinopril 5 MG tablet    PRINIVIL/ZESTRIL    90 tablet    Take 1 tablet (5 mg) by mouth daily    Benign essential hypertension       LORazepam 0.5 MG tablet    ATIVAN    24 tablet    Take 1 tablet (0.5 mg) by mouth every 8 hours as needed for anxiety    DORON (generalized anxiety disorder)       LUMIGAN 0.01 % Soln   Generic drug:  bimatoprost      Place 1 drop into both eyes At Bedtime.        MELATONIN PO      Take 3 mg by mouth nightly as needed        MIRTAZAPINE PO      Take 15 mg by mouth At Bedtime        MULTI-VITAMIN PO      Take 1 tablet by mouth daily.        nystatin-triamcinolone ointment    MYCOLOG    30 g    APPLY TO AFFECTED AREA TWO TIMES A DAY    Dermatitis       omeprazole 40 MG capsule    priLOSEC    90 capsule    Take 1 capsule (40 mg) by mouth daily Take 30-60 minutes before a meal.    Gastroesophageal reflux disease without esophagitis       order for DME     1 Units    Equipment being ordered: Wheelchair with back and seat cushion    Acquired absence of knee joint       order for DME     1 Units    Equipment being ordered: orthopedic shoes with riser for limb length disrepancy from    Acquired leg length discrepancy, Joint prosthesis infection or inflammation, sequela       penicillin V potassium 500 MG tablet    VEETID     Take 500 mg by mouth 2 times daily        PROBIOTIC DAILY PO      Take by mouth daily        sodium chloride 1 GM tablet     90 tablet    TAKE ONE TABLET BY MOUTH ON MONDAY, WEDNESDAY, AND FRIDAY. TAKE DAILY IF SODIUM IS OUT OF RANGE    Hyponatremia       STOOL SOFTENER PO      Take 100 mg by mouth daily.        VITAMIN D-1000 MAX ST 1000 units Tabs   Generic drug:  cholecalciferol      Take 1,000 Units by mouth        *  Notice:  This list has 2 medication(s) that are the same as other medications prescribed for you. Read the directions carefully, and ask your doctor or other care provider to review them with you.

## 2018-05-22 NOTE — PROGRESS NOTES
SUBJECTIVE:                                                    Arielle Stallworth is a 86 year old female who presents to clinic today for the following health issues:    Discuss Mirtazapine and Effexor with the sodium.        Problem list and histories reviewed & adjusted, as indicated.  Additional history: needs recheck after the hospital   I have already sent in the effexor weaning we did discuss the schedule Arielle is a little heistant as this is a med from the psychiatrist it will take her weeks to wean off fo the medication so starting now shuld not be a problem. I think psych will be fine with this. 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      HYSTERECTOMY       HYSTERECTOMY RADICAL      2002     JOINT REPLACEMENT      Left x2 and rt x1     ORTHOPEDIC SURGERY      Bilat. knees       Social History   Substance Use Topics     Smoking status: Former Smoker     Packs/day: 0.50     Years:  "30.00     Types: Cigarettes     Quit date: 1/1/1960     Smokeless tobacco: Never Used     Alcohol use Yes      Comment: Occasional     No family history on file.        ROS:  Constitutional, HEENT, cardiovascular, pulmonary, gi and gu systems are negative, except as otherwise noted.    OBJECTIVE:                                                    /82  Pulse 88  Temp 98.4  F (36.9  C) (Tympanic)  Ht 5' 5\" (1.651 m)  Wt 145 lb 11.2 oz (66.1 kg)  BMI 24.25 kg/m2 Body mass index is 24.25 kg/(m^2).   GENERAL: healthy, alert, well nourished, well hydrated, no distress  NECK: no tenderness, no adenopathy, no asymmetry, no masses, no stiffness; thyroid- normal to palpation  RESP: lungs clear to auscultation - no rales, no rhonchi, no wheezes  CV: regular rates and rhythm, normal S1 S2, no S3 or S4 and no murmur, no click or rub -  ABDOMEN: soft, no tenderness, no  hepatosplenomegaly, no masses, normal bowel sounds       ASSESSMENT/PLAN:                                                      1. Hyponatremia  - Sodium    See note from yesterday for weaning schedule. She will follow up with Dr. Renato vázquez in 2 weeks. At that point she may start another med or not. Will do every other week sodium for now    reports that she quit smoking about 58 years ago. Her smoking use included Cigarettes. She has a 15.00 pack-year smoking history. She has never used smokeless tobacco.          Constance Cohen M.D.  [facility]    "

## 2018-05-23 ENCOUNTER — TELEPHONE (OUTPATIENT)
Dept: FAMILY MEDICINE | Facility: CLINIC | Age: 83
End: 2018-05-23

## 2018-05-23 NOTE — TELEPHONE ENCOUNTER
Sodium results not forwarded to [facility].  Daughter notified of OK sodium results.  Thank you..Lucia Edwards

## 2018-05-24 ENCOUNTER — TELEPHONE (OUTPATIENT)
Dept: FAMILY MEDICINE | Facility: CLINIC | Age: 83
End: 2018-05-24

## 2018-05-24 NOTE — TELEPHONE ENCOUNTER
These have been prescribed by her psychiatrist. I am weaning her off of one of them . Constance Cohen M.D.

## 2018-05-24 NOTE — TELEPHONE ENCOUNTER
Reason for Call: Request for an order or referral:    Order or referral being requested: Tiffanie calling from  [facility] to request orders:  1)  PT twice a week x 4 weeks for strengthening, balance and mobility issues.  2)  OT evaluation for ADL's, cognition and safety.  Please review and advise.  Thank you..Lucia Edwards    Date needed: as soon as possible    Has the patient been seen by the PCP for this problem? YES    Phone number Patient can be reached at:  Other phone number:  Tiffanie - [phone]*    Call taken on 5/24/2018 at 8:21 AM by Lucia Edwards

## 2018-05-30 ENCOUNTER — TELEPHONE (OUTPATIENT)
Dept: CARE COORDINATION | Facility: CLINIC | Age: 83
End: 2018-05-30

## 2018-05-30 ENCOUNTER — TELEPHONE (OUTPATIENT)
Dept: FAMILY MEDICINE | Facility: CLINIC | Age: 83
End: 2018-05-30

## 2018-05-30 NOTE — TELEPHONE ENCOUNTER
[facility] now requests orders and shares plan of care/discharge summaries for some patients through [employer].  Please REPLY TO THIS MESSAGE OR ROUTE BACK TO THE AUTHOR in order to give authorization for orders when needed.  This is considered a verbal order, you will still receive a faxed copy of orders for signature.  Thank you for your assistance in improving collaboration for our patients.    ORDER    Occupational therapy 1 more visit this week and then 2x per week for 2 weeks starting week of 6/3/18. Treatment to focus on energy conservation, equipment, and safety/independence with ADLs/IADLs.     Thank you,  Monica Byers MA, OTR/L

## 2018-05-30 NOTE — TELEPHONE ENCOUNTER
Reason for Call:  Other Eye vitamins    Detailed comments: Frances LEFT MESSAGE:  She is wondering if it is OK to have Arielle take [employer]ive - [employer]red[employer] vitamins for eyes.  Wanted to make sure it is OK before she starts them.  Please review and advise. Thank you..Lucia Edwards    Phone Number Patient can be reached at: Other phone number:  [phone]      Call taken on 5/30/2018 at 2:06 PM by Lucia Edwards

## 2018-06-04 ENCOUNTER — TELEPHONE (OUTPATIENT)
Dept: FAMILY MEDICINE | Facility: CLINIC | Age: 83
End: 2018-06-04

## 2018-06-04 DIAGNOSIS — E87.1 HYPONATREMIA: Primary | ICD-10-CM

## 2018-06-04 DIAGNOSIS — E87.1 HYPONATREMIA: ICD-10-CM

## 2018-06-04 LAB — SODIUM SERPL-SCNC: 138 MMOL/L (ref 133–144)

## 2018-06-04 PROCEDURE — 84295 ASSAY OF SERUM SODIUM: CPT | Performed by: FAMILY MEDICINE

## 2018-06-04 PROCEDURE — 36415 COLL VENOUS BLD VENIPUNCTURE: CPT | Performed by: FAMILY MEDICINE

## 2018-06-05 ENCOUNTER — NURSE TRIAGE (OUTPATIENT)
Dept: NURSING | Facility: CLINIC | Age: 83
End: 2018-06-05

## 2018-06-05 NOTE — TELEPHONE ENCOUNTER
Patient's daughter Pilar calling for results of recent Sodium lab draw.  Declines triage.    Lab result given and provided PCP note as pasted below.    Entered by Constance Cohen MD at 6/5/2018  5:52 PM   Arielle,   Your lab results were normal/stable. Please feel free to my chart or call the office with questions. Constance Cohen M.D.     Caller has no further questions.    Protocol and care advice reviewed.   Caller states understanding of the recommended disposition.  Advised to call back if further questions or concerns.       Additional Information    [1] Follow-up call to recent contact AND [2] information only call, no triage required    Protocols used: INFORMATION ONLY CALL-ADULT-

## 2018-06-06 ENCOUNTER — TELEPHONE (OUTPATIENT)
Dept: FAMILY MEDICINE | Facility: CLINIC | Age: 83
End: 2018-06-06

## 2018-06-06 DIAGNOSIS — E87.1 HYPONATREMIA: Primary | ICD-10-CM

## 2018-06-06 RX ORDER — SODIUM CHLORIDE 1 G/1
1 TABLET ORAL 2 TIMES DAILY
Qty: 60 TABLET | Refills: 0 | Status: SHIPPED | OUTPATIENT
Start: 2018-06-06 | End: 2018-11-15

## 2018-06-06 NOTE — TELEPHONE ENCOUNTER
Reason for call:  Patient reporting a symptom    Symptom or request: Arielle found out that her sodium (for her) is very high.  It's 138, however it's never been above 131 ever.  Frances is concerned because she will not take her sodium pills any longer that she knows it's that high.  Her anxiety is also getting worst due to her sodium being high.  She is suppose to be weaning off effexor, and is at 75mg, however she is getting worst in that area also.  Suggestsions?  Could you please review and advise. Thank you..Lucia Edwards    Duration (how long have symptoms been present): on going    Have you been treated for this before? Yes    Phone Number patient can be reached at:  Other phone number:  [phone]*    Best Time:  Any time    Can we leave a detailed message on this number:  YES    Call taken on 6/6/2018 at 10:13 AM by Lucia Edwards

## 2018-06-06 NOTE — TELEPHONE ENCOUNTER
Daughter Frances notified with Dr. Cohen's instructions, Frances will call back for lab appointment, has asked to let PCP know that patient will review with Dr. Gross what she is currently advised on from PCP. Will route to PCP to update. Placed sodium orders in chart for recheck.    OLIVER Samayoa

## 2018-06-06 NOTE — TELEPHONE ENCOUNTER
At the 75 mg she has normal sodium so maybe she can stay at the 75 mg and we can recheck the sodium in 1-2 wweeks. She can cut down to sodium 2 times per day and see if this is a better combination for her. Her mirtazapine could be increased to 30 mg if she is not too drowsy on this and that may also help with the anxiety.  Constance Cohen M.D.

## 2018-06-06 NOTE — TELEPHONE ENCOUNTER
Routing refill request to provider for review/approval because:  New instructions are to take the sodium 1 gram BID per telephone encounter today.  Please review and approve of new sig change, thank you.    OLIVER Samayoa

## 2018-06-14 ENCOUNTER — OFFICE VISIT (OUTPATIENT)
Dept: FAMILY MEDICINE | Facility: CLINIC | Age: 83
End: 2018-06-14
Payer: MEDICARE

## 2018-06-14 ENCOUNTER — AMBULATORY - HEALTHEAST (OUTPATIENT)
Dept: CARDIOLOGY | Facility: CLINIC | Age: 83
End: 2018-06-14

## 2018-06-14 VITALS — DIASTOLIC BLOOD PRESSURE: 70 MMHG | TEMPERATURE: 98.3 F | SYSTOLIC BLOOD PRESSURE: 110 MMHG | HEART RATE: 64 BPM

## 2018-06-14 DIAGNOSIS — Z95.0 CARDIAC PACEMAKER IN SITU: ICD-10-CM

## 2018-06-14 DIAGNOSIS — F32.1 MODERATE SINGLE CURRENT EPISODE OF MAJOR DEPRESSIVE DISORDER (H): Chronic | ICD-10-CM

## 2018-06-14 DIAGNOSIS — E87.1 HYPONATREMIA: ICD-10-CM

## 2018-06-14 DIAGNOSIS — Z00.00 MEDICARE ANNUAL WELLNESS VISIT, SUBSEQUENT: Primary | ICD-10-CM

## 2018-06-14 LAB
HCC DEVICE COMMENTS: NORMAL
HCC DEVICE IMPLANTING PROVIDER: NORMAL
HCC DEVICE MANUFACTURE: NORMAL
HCC DEVICE MODEL: NORMAL
HCC DEVICE SERIAL NUMBER: NORMAL
HCC DEVICE TYPE: NORMAL
SODIUM SERPL-SCNC: 132 MMOL/L (ref 133–144)

## 2018-06-14 PROCEDURE — 36415 COLL VENOUS BLD VENIPUNCTURE: CPT | Performed by: FAMILY MEDICINE

## 2018-06-14 PROCEDURE — 99213 OFFICE O/P EST LOW 20 MIN: CPT | Mod: 25 | Performed by: FAMILY MEDICINE

## 2018-06-14 PROCEDURE — G0439 PPPS, SUBSEQ VISIT: HCPCS | Performed by: FAMILY MEDICINE

## 2018-06-14 PROCEDURE — 84295 ASSAY OF SERUM SODIUM: CPT | Performed by: FAMILY MEDICINE

## 2018-06-14 RX ORDER — MIRTAZAPINE 15 MG/1
15 TABLET, FILM COATED ORAL AT BEDTIME
Qty: 90 TABLET | Refills: 1 | Status: SHIPPED | OUTPATIENT
Start: 2018-06-14 | End: 2018-10-04 | Stop reason: DRUGHIGH

## 2018-06-14 RX ORDER — BUPROPION HYDROCHLORIDE 100 MG/1
TABLET, EXTENDED RELEASE ORAL
Qty: 60 TABLET | Refills: 3 | Status: SHIPPED | OUTPATIENT
Start: 2018-06-14 | End: 2018-10-04

## 2018-06-14 NOTE — MR AVS SNAPSHOT
After Visit Summary   6/14/2018    Arielle Stallworth    MRN: [id number]           Patient Information     Date Of Birth          6/9/1931        Visit Information        Provider Department      6/14/2018 1:30 PM Constance Cohen MD [facility]        Today's Diagnoses     Medicare annual wellness visit, subsequent    -  1    Hyponatremia        Moderate single current episode of major depressive disorder (H)          Care Instructions      Preventive Health Recommendations    Female Ages 65 +    Yearly exam:     See your health care provider every year in order to  o Review health changes.   o Discuss preventive care.    o Review your medicines if your doctor has prescribed any.      You no longer need a yearly Pap test unless you've had an abnormal Pap test in the past 10 years. If you have vaginal symptoms, such as bleeding or discharge, be sure to talk with your provider about a Pap test.      Every 1 to 2 years, have a mammogram.  If you are over 69, talk with your health care provider about whether or not you want to continue having screening mammograms.      Every 10 years, have a colonoscopy. Or, have a yearly FIT test (stool test). These exams will check for colon cancer.       Have a cholesterol test every 5 years, or more often if your doctor advises it.       Have a diabetes test (fasting glucose) every three years. If you are at risk for diabetes, you should have this test more often.       At age 65, have a bone density scan (DEXA) to check for osteoporosis (brittle bone disease).    Shots:    Get a flu shot each year.    Get a tetanus shot every 10 years.    Talk to your doctor about your pneumonia vaccines. There are now two you should receive - Pneumovax (PPSV 23) and Prevnar (PCV 13).    Talk to your doctor about the shingles vaccine.    Talk to your doctor about the hepatitis B vaccine.    Nutrition:     Eat at least 5 servings of fruits and vegetables each day.      Eat  whole-grain bread, whole-wheat pasta and brown rice instead of white grains and rice.      Talk to your provider about Calcium and Vitamin D.     Lifestyle    Exercise at least 150 minutes a week (30 minutes a day, 5 days a week). This will help you control your weight and prevent disease.      Limit alcohol to one drink per day.      No smoking.       Wear sunscreen to prevent skin cancer.       See your dentist twice a year for an exam and cleaning.      See your eye doctor every 1 to 2 years to screen for conditions such as glaucoma, macular degeneration and cataracts.      Decrease the effexor to 1 capsule daily for 1 week you can also start the wellbutrin in the morning   2 weeks after stopping the effexor get the sodium checked     Increase your liquid intake to 1500 cc             Follow-ups after your visit        Who to contact     Normal or non-critical lab and imaging results will be communicated to you by [employer], letter or phone within 4 business days after the clinic has received the results. If you do not hear from us within 7 days, please contact the clinic through [employer] or phone. If you have a critical or abnormal lab result, we will notify you by phone as soon as possible.  Submit refill requests through [employer] or call your pharmacy and they will forward the refill request to us. Please allow 3 business days for your refill to be completed.          If you need to speak with a  for additional information , please call: [phone]             Additional Information About Your Visit        [employer] Information     [employer] gives you secure access to your electronic health record. If you see a primary care provider, you can also send messages to your care team and make appointments. If you have questions, please call your primary care clinic.  If you do not have a primary care provider, please call [phone] and they will assist you.        Care EveryWhere ID     This is your  Care EveryWhere ID. This could be used by other organizations to access your [place] medical records  [id number]        Your Vitals Were     Pulse Temperature                64 98.3  F (36.8  C) (Tympanic)           Blood Pressure from Last 3 Encounters:   06/14/18 110/70   05/22/18 136/82   05/18/18 138/77    Weight from Last 3 Encounters:   05/22/18 145 lb 11.2 oz (66.1 kg)   04/10/18 149 lb 9.6 oz (67.9 kg)   10/11/17 149 lb (67.6 kg)              We Performed the Following     Sodium          Today's Medication Changes          These changes are accurate as of 6/14/18  2:15 PM.  If you have any questions, ask your nurse or doctor.               Start taking these medicines.        Dose/Directions    buPROPion 100 MG 12 hr tablet   Commonly known as:  WELLBUTRIN SR   Used for:  Moderate single current episode of major depressive disorder (H)   Started by:  Constance Cohen MD        Take 1 tablet by mouth in the morning for 2 weeks then you can increase to 1 2 times per day   Quantity:  60 tablet   Refills:  3         These medicines have changed or have updated prescriptions.        Dose/Directions    mirtazapine 15 MG tablet   Commonly known as:  REMERON   This may have changed:    - medication strength  - how much to take   Used for:  Moderate single current episode of major depressive disorder (H)   Changed by:  Constance Cohen MD        Dose:  15 mg   Take 1 tablet (15 mg) by mouth At Bedtime   Quantity:  90 tablet   Refills:  1       venlafaxine 37.5 MG 24 hr capsule   Commonly known as:  EFFEXOR-XR   This may have changed:  Another medication with the same name was removed. Continue taking this medication, and follow the directions you see here.   Used for:  Hyponatremia   Changed by:  Constance Cohen MD        3 capsules for 2 weeks then 2 capsules for 2 weeks then 1 capsule for 2 weeks   Quantity:  90 capsule   Refills:  1       VITAMIN D-3 PO   This may have changed:  Another medication with the  same name was removed. Continue taking this medication, and follow the directions you see here.   Changed by:  Constance Cohen MD        Refills:  0            Where to get your medicines      These medications were sent to [facility], [place] - [address]  [address] [facility] [place] [facility] [address]     Phone:  [phone]     buPROPion 100 MG 12 hr tablet    mirtazapine 15 MG tablet                Primary Care Provider Office Phone # Fax #    Constance Cohen [phone] [address] [facility] [facility] [address]        Equal Access to Services     [facility]: Hadii aad ku hadasho Soomaali, waaxda luqadaha, qaybta kaalmada adeegyada, waxay idiin hayaan adeeg kharash lajules . So [facility] [phone].    ATENCIÓN: Si habla español, tiene a earl disposición servicios gratuitos de asistencia lingüística. Ll[facility] [phone].    We comply with applicable federal civil rights laws and [place] laws. We do not discriminate on the basis of race, color, national origin, age, disability, sex, sexual orientation, or gender identity.            Thank you!     Thank you for choosing [facility]  for your care. Our goal is always to provide you with excellent care. Hearing back from our patients is one way we can continue to improve our services. Please take a few minutes to complete the written survey that you may receive in the mail after your visit with us. Thank you!             Your Updated Medication List - Protect others around you: Learn how to safely use, store and throw away your medicines at www.disposemymeds.org.          This list is accurate as of 6/14/18  2:15 PM.  Always use your most recent med list.                   Brand Name Dispense Instructions for use Diagnosis    acetaminophen 650 MG 8 hour tablet     100 tablet    Take 650 mg by mouth every 4 hours as needed for mild pain    Left leg cellulitis       aspirin 81 MG EC tablet     90 tablet    Take  1 tablet (81 mg) by mouth daily    Acquired absence of knee joint       buPROPion 100 MG 12 hr tablet    WELLBUTRIN SR    60 tablet    Take 1 tablet by mouth in the morning for 2 weeks then you can increase to 1 2 times per day    Moderate single current episode of major depressive disorder (H)       Doxycycline Hyclate 150 MG Tabs     60 tablet    Take 150 mg by mouth 2 times daily Resume once course of levaquin is completed    Left leg cellulitis       lisinopril 5 MG tablet    PRINIVIL/ZESTRIL    90 tablet    Take 1 tablet (5 mg) by mouth daily    Benign essential hypertension       LORazepam 0.5 MG tablet    ATIVAN    24 tablet    Take 1 tablet (0.5 mg) by mouth every 8 hours as needed for anxiety    DORON (generalized anxiety disorder)       LUMIGAN 0.01 % Soln   Generic drug:  bimatoprost      Place 1 drop into both eyes At Bedtime.        MELATONIN PO      Take 3 mg by mouth nightly as needed        mirtazapine 15 MG tablet    REMERON    90 tablet    Take 1 tablet (15 mg) by mouth At Bedtime    Moderate single current episode of major depressive disorder (H)       MULTI-VITAMIN PO      Take 1 tablet by mouth daily.        nystatin-triamcinolone ointment    MYCOLOG    30 g    APPLY TO AFFECTED AREA TWO TIMES A DAY    Dermatitis       omeprazole 40 MG capsule    priLOSEC    90 capsule    Take 1 capsule (40 mg) by mouth daily Take 30-60 minutes before a meal.    Gastroesophageal reflux disease without esophagitis       penicillin V potassium 500 MG tablet    VEETID     Take 500 mg by mouth 2 times daily        PROBIOTIC DAILY PO      Take by mouth daily        sodium chloride 1 GM tablet     60 tablet    Take 1 tablet (1 g) by mouth 2 times daily    Hyponatremia       STOOL SOFTENER PO      Take 100 mg by mouth daily.        venlafaxine 37.5 MG 24 hr capsule    EFFEXOR-XR    90 capsule    3 capsules for 2 weeks then 2 capsules for 2 weeks then 1 capsule for 2 weeks    Hyponatremia       VITAMIN D-3 PO

## 2018-06-14 NOTE — PROGRESS NOTES
SUBJECTIVE:   Arielle Stallworth is a 87 year old female who presents for Preventive Visit.  Are you in the first 12 months of your Medicare Part B coverage?  No    Healthy Habits:    Do you get at least three servings of calcium containing foods daily (dairy, green leafy vegetables, etc.)? yes    Amount of exercise or daily activities, outside of work: 7 day(s) per week    Problems taking medications regularly No    Medication side effects: No    Have you had an eye exam in the past two years? yes    Do you see a dentist twice per year? yes    Do you have sleep apnea, excessive snoring or daytime drowsiness?no      Ability to successfully perform activities of daily living: No, needs assistance with:      Home safety:  none identified     Hearing impairment: Yes, has an appointment for audiology     Fall risk:  Fallen 2 or more times in the past year?: No  Any fall with injury in the past year?: No      COGNITIVE SCREEN  1) Repeat 3 items (Banana, Sunrise, Chair)    2) Clock draw: NORMAL  3) 3 item recall: Recalls 2 objects   Results: NORMAL clock, 1-2 items recalled: COGNITIVE IMPAIRMENT LESS LIKELY    Mini-CogTM Copyright S Ellen. Licensed by the author for use in [facility]; reprinted with permission (renato@[facility]). All rights reserved.        Discussed her sodium and weaning off of the effexor. The sodium increased as soon as the weaning had begun  She was not able to take the increased dosing of the remeron we reveiwed the medicaitons that she had previously been on for the mood. She is now seeing a new psychiatrist . She has an appointment for a recheck in the next few weeks. It does not look like she has been on wellbutrin before and  Her depression is the most prominent symptom at this point.   She is able to sleep with the remeron  Her daughter Frances and granddaughter Madonna are with her today.   They do endorse taht she is depressed but this is her usual mood.       Reviewed and updated as  needed this visit by clinical staff  Tobacco  Allergies  Meds  Med Hx  Surg Hx  Fam Hx  Soc Hx        Reviewed and updated as needed this visit by Provider        Social History   Substance Use Topics     Smoking status: Former Smoker     Packs/day: 0.50     Years: 30.00     Types: Cigarettes     Quit date: 1/1/1960     Smokeless tobacco: Never Used     Alcohol use Yes      Comment: Occasional       If you drink alcohol do you typically have >3 drinks per day or >7 drinks per week? No                        Today's PHQ-2 Score:   PHQ-2 ( 1999 [employer]) 4/10/2018 4/10/2018   Q1: Little interest or pleasure in doing things 1 0   Q2: Feeling down, depressed or hopeless 1 0   PHQ-2 Score 2 0       Do you feel safe in your environment - Yes    Do you have a Health Care Directive?: Yes: Advance Directive has been received and scanned.    Current providers sharing in care for this patient include:   Patient Care Team:  Constance Cohen MD as PCP - General (Family Practice)    The following health maintenance items are reviewed in Epic and correct as of today:  Health Maintenance   Topic Date Due     LIPID MONITORING Q1 YEAR  06/09/1932     DEXA Q3 YR  06/09/1961     DEPRESSION ACTION PLAN Q1 YR  03/27/2017     ADVANCE DIRECTIVE PLANNING Q5 YRS  02/12/2018     PHQ-9 Q6 MONTHS  10/10/2018     FALL RISK ASSESSMENT  04/10/2019     TETANUS IMMUNIZATION (SYSTEM ASSIGNED)  07/20/2026     PNEUMOCOCCAL  Completed     BP Readings from Last 3 Encounters:   06/14/18 110/70   05/22/18 136/82   05/18/18 138/77    Wt Readings from Last 3 Encounters:   05/22/18 145 lb 11.2 oz (66.1 kg)   04/10/18 149 lb 9.6 oz (67.9 kg)   10/11/17 149 lb (67.6 kg)                  Patient Active Problem List   Diagnosis     Cardiac pacemaker in situ     Advanced directives, counseling/discussion     Hyponatremia     Intertrigo     Depression, major     Arthritis     Cellulitis of left leg     Sepsis (H)     [facility]     Anxiety     Anemia  "    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      HYSTERECTOMY       HYSTERECTOMY RADICAL      2002     JOINT REPLACEMENT      Left x2 and rt x1     ORTHOPEDIC SURGERY      Bilat. knees       Social History   Substance Use Topics     Smoking status: Former Smoker     Packs/day: 0.50     Years: 30.00     Types: Cigarettes     Quit date: 1/1/1960     Smokeless tobacco: Never Used     Alcohol use Yes      Comment: Occasional     History reviewed. No pertinent family history.            ROS:  Constitutional, HEENT, cardiovascular, pulmonary, gi and gu systems are negative, except as otherwise noted.    OBJECTIVE:   /70  Pulse 64  Temp 98.3  F (36.8  C) (Tympanic) Estimated body mass index is 24.25 kg/(m^2) as calculated from the following:    Height as of 5/22/18: 5' 5\" (1.651 m).    Weight as of 5/22/18: 145 lb 11.2 oz (66.1 kg).  EXAM:   GENERAL: healthy, alert and no distress  NECK: no adenopathy, no asymmetry, masses, or scars and thyroid normal to palpation  RESP: lungs clear to auscultation - no rales, rhonchi or wheezes  CV: regular rate and rhythm, normal S1 S2, no S3 or S4, no murmur, click or rub, no peripheral edema and peripheral pulses strong  ABDOMEN: soft, nontender, no hepatosplenomegaly, no masses and bowel sounds normal  MS: seated in wheelchair with left leg in brace with riser shoe on . No edema knee without erythema   MENTAL STATUS EXAM:    1. Clinical observations: " Arielle was clean and was adequately groomed. Arielle's emotional presentation was open and cooperative. She spoke clear and articulate. She maintained good eye contact and she was cooperative in answering questions.   2. She appeared to be well-oriented in all spheres with coherent, logical, goal directed and relevent thinking.   3. Thought content: She denies no abnormal thought process.   4. Affect and mood: Selvins affect is described as normal/appropriate and her emotional attitude was open and cooperative. She reports the following sypmtoms: lack of interest or enjoyment, feeling negative about the future, feeling fatiqued, too much worry and dwelling on problems.    5. Sensorium and cognition: She was in contact with reality and oriented to time, place and person.  She demonstrated no impairment in immediate, recent, or remote memory. Her insight was adequate and her  intelligence appeared to be average.      ASSESSMENT / PLAN:   1. Medicare annual wellness visit, subsequent  Reviewed and updated     Decrease the effexor to 1 capsule daily for 1 week you can also start the wellbutrin in the morning   2 weeks after stopping the effexor get the sodium checked     Increase your liquid intake to 1500 cc   2. Hyponatremia  .ptin  - Sodium    3. Moderate single current episode of major depressive disorder (H)  See above   - buPROPion (WELLBUTRIN SR) 100 MG 12 hr tablet; Take 1 tablet by mouth in the morning for 2 weeks then you can increase to 1 2 times per day  Dispense: 60 tablet; Refill: 3  - mirtazapine (REMERON) 15 MG tablet; Take 1 tablet (15 mg) by mouth At Bedtime  Dispense: 90 tablet; Refill: 1    End of Life Planning:  Patient currently has an advanced directive: Yes.  Practitioner is supportive of decision.    COUNSELING:  Reviewed preventive health counseling, as reflected in patient instructions        Estimated body mass index is 24.25 kg/(m^2) as calculated from the following:    Height as of 5/22/18:  "5' 5\" (1.651 m).    Weight as of 5/22/18: 145 lb 11.2 oz (66.1 kg).       reports that she quit smoking about 58 years ago. Her smoking use included Cigarettes. She has a 15.00 pack-year smoking history. She has never used smokeless tobacco.      Appropriate preventive services were discussed with this patient, including applicable screening as appropriate for cardiovascular disease, diabetes, osteopenia/osteoporosis, and glaucoma.  As appropriate for age/gender, discussed screening for colorectal cancer, prostate cancer, breast cancer, and cervical cancer. Checklist reviewing preventive services available has been given to the patient.    Reviewed patients plan of care and provided an AVS. The Intermediate Care Plan ( asthma action plan, low back pain action plan, and migraine action plan) for Arielle meets the Care Plan requirement. This Care Plan has been established and reviewed with the Patient.    Counseling Resources:  ATP IV Guidelines  Pooled Cohorts Equation Calculator  Breast Cancer Risk Calculator  FRAX Risk Assessment  ICSI Preventive Guidelines  Dietary Guidelines for Americans, 2010  USDA's MyPlate  ASA Prophylaxis  Lung CA Screening    Constance Cohen MD  [facility]  "

## 2018-06-14 NOTE — PATIENT INSTRUCTIONS

## 2018-06-18 ENCOUNTER — TELEPHONE (OUTPATIENT)
Dept: FAMILY MEDICINE | Facility: CLINIC | Age: 83
End: 2018-06-18

## 2018-06-18 NOTE — TELEPHONE ENCOUNTER
[facility]      Frances notified of results and Dr. Cohen's recommendations.  Thank you..Lucia Edwards

## 2018-06-19 ENCOUNTER — COMMUNICATION - HEALTHEAST (OUTPATIENT)
Dept: ADMINISTRATIVE | Facility: CLINIC | Age: 83
End: 2018-06-19

## 2018-06-19 DIAGNOSIS — Z96.659 INFECTED PROSTHETIC KNEE JOINT, SEQUELA: ICD-10-CM

## 2018-06-19 DIAGNOSIS — T84.59XS INFECTED PROSTHETIC KNEE JOINT, SEQUELA: ICD-10-CM

## 2018-06-21 DIAGNOSIS — L03.116 LEFT LEG CELLULITIS: ICD-10-CM

## 2018-06-21 NOTE — TELEPHONE ENCOUNTER
Patient is going out of town this Sunday and will need her medications soon. If someone can look at it and approve these two rx... That would be great. There is a historical document that she was taking doxycycline 150mg BID and that is incorrect. The prescription we transferred over that had no refills were written for the 100mg. I also called and verified with patient and she said that it should be 100mg and not the 150mg.     Thanks!    See Renato  Pharmacy Technician, [place]  [facility]  Phone: [phone]  Fax: [phone]

## 2018-06-22 ENCOUNTER — TRANSFERRED RECORDS (OUTPATIENT)
Dept: HEALTH INFORMATION MANAGEMENT | Facility: CLINIC | Age: 83
End: 2018-06-22

## 2018-06-22 ENCOUNTER — AMBULATORY - HEALTHEAST (OUTPATIENT)
Dept: INFECTIOUS DISEASES | Facility: CLINIC | Age: 83
End: 2018-06-22

## 2018-06-22 DIAGNOSIS — Z96.659 INFECTION OF PROSTHETIC KNEE JOINT, SUBSEQUENT ENCOUNTER: ICD-10-CM

## 2018-06-22 DIAGNOSIS — T84.59XD INFECTION OF PROSTHETIC KNEE JOINT, SUBSEQUENT ENCOUNTER: ICD-10-CM

## 2018-06-22 RX ORDER — DOXYCYCLINE 100 MG/1
100 TABLET ORAL 2 TIMES DAILY
Qty: 60 TABLET | Refills: 1 | OUTPATIENT
Start: 2018-06-22 | End: 2018-07-22

## 2018-06-22 RX ORDER — PENICILLIN V POTASSIUM 500 MG/1
500 TABLET, FILM COATED ORAL 2 TIMES DAILY
OUTPATIENT
Start: 2018-06-22

## 2018-06-22 NOTE — TELEPHONE ENCOUNTER
I spoke with patient, she does not need penicillin. She takes both daily.  I talked to See, doxycycline has been approved by other clinic.    From care everywhere  Recommendations:  1. Pen VK long term at 500 mg bid  4. Cont chronic doxycycline 100 mg bid.  5. Rx for SIADH one one gram NaCl per day  6. Remove penicillin allergy from med list.  7. F/u CRP today  8. RTC 1 year.    Barbara Spears PA-C

## 2018-06-22 NOTE — TELEPHONE ENCOUNTER
Can you look at this please? Patient is out and is going out of town today.    See Renato  Pharmacy Technician, [place]  [facility]  Phone: [phone]  Fax: [phone]

## 2018-06-25 ENCOUNTER — MEDICAL CORRESPONDENCE (OUTPATIENT)
Dept: HEALTH INFORMATION MANAGEMENT | Facility: CLINIC | Age: 83
End: 2018-06-25

## 2018-06-25 PROCEDURE — G0180 MD CERTIFICATION HHA PATIENT: HCPCS | Performed by: FAMILY MEDICINE

## 2018-07-25 ENCOUNTER — OFFICE VISIT (OUTPATIENT)
Dept: FAMILY MEDICINE | Facility: CLINIC | Age: 83
End: 2018-07-25
Payer: MEDICARE

## 2018-07-25 VITALS
HEART RATE: 84 BPM | WEIGHT: 145 LBS | HEIGHT: 65 IN | BODY MASS INDEX: 24.16 KG/M2 | TEMPERATURE: 99 F | DIASTOLIC BLOOD PRESSURE: 74 MMHG | SYSTOLIC BLOOD PRESSURE: 120 MMHG

## 2018-07-25 DIAGNOSIS — E87.1 HYPONATREMIA: ICD-10-CM

## 2018-07-25 DIAGNOSIS — F32.5 MAJOR DEPRESSIVE DISORDER, SINGLE EPISODE IN FULL REMISSION (H): Primary | ICD-10-CM

## 2018-07-25 LAB — SODIUM SERPL-SCNC: 133 MMOL/L (ref 133–144)

## 2018-07-25 PROCEDURE — 99214 OFFICE O/P EST MOD 30 MIN: CPT | Performed by: FAMILY MEDICINE

## 2018-07-25 PROCEDURE — 84295 ASSAY OF SERUM SODIUM: CPT | Performed by: FAMILY MEDICINE

## 2018-07-25 PROCEDURE — 36415 COLL VENOUS BLD VENIPUNCTURE: CPT | Performed by: FAMILY MEDICINE

## 2018-07-25 RX ORDER — BUPROPION HYDROCHLORIDE 150 MG/1
150 TABLET ORAL EVERY MORNING
Qty: 30 TABLET | Refills: 1 | Status: SHIPPED | OUTPATIENT
Start: 2018-07-25 | End: 2018-10-04

## 2018-07-25 NOTE — PROGRESS NOTES
"SUBJECTIVE:                                                    Arielle Stallworth is a 87 year old female who presents to clinic today for the following health issues:        Problem list and histories reviewed & adjusted, as indicated.  Additional history: she is doing ok   She has been getting more teary eyed. Has not been more anxious   Being off the effexor has made her more sad.     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      HYSTERECTOMY       HYSTERECTOMY RADICAL      2002     JOINT REPLACEMENT      Left x2 and rt x1     ORTHOPEDIC SURGERY      Bilat. knees       Social History   Substance Use Topics     Smoking status: Former Smoker     Packs/day: 0.50     Years: 30.00     Types: Cigarettes     Quit date: 1/1/1960     Smokeless tobacco: Never Used     Alcohol use Yes      Comment: Occasional     No family history on file.        ROS:  Constitutional, HEENT, cardiovascular, pulmonary, gi and gu systems are negative, except as otherwise noted.    OBJECTIVE:                                                    /74  Pulse 84  Temp 99  F (37.2  C) (Tympanic)  Ht 5' 5\" (1.651 m)  Wt 145 lb (65.8 kg)  BMI 24.13 kg/m2 " Body mass index is 24.13 kg/(m^2).   GENERAL APPEARANCE: healthy, alert and no distress  NECK: no adenopathy, no asymmetry, masses, or scars and thyroid normal to palpation  RESP: lungs clear to auscultation - no rales, rhonchi or wheezes  CV: regular rates and rhythm, normal S1 S2, no S3 or S4 and no murmur, click or rub  MENTAL STATUS EXAM:    1. Clinical observations: Arielle was clean and was adequately groomed. Arielle's emotional presentation was open and cooperative. She spoke clear and articulate. She maintained good eye contact and she was cooperative in answering questions.   2. She appeared to be well-oriented in all spheres with coherent, logical, goal directed and relevent thinking.   3. Thought content: She denies no abnormal thought process.   4. Affect and mood: Selvins affect is described as normal/appropriate and her emotional attitude was open and cooperative. She reports the following sypmtoms: sad feeling or depressed, difficulty concentrating, drawing away from people, lack of interest or enjoyment, feeling fatiqued and too much worry.    5. Sensorium and cognition: She was in contact with reality and oriented to time, place and person.  She demonstrated no impairment in immediate, recent, or remote memory. Her insight was adequate and her  intelligence appeared to be average.         ASSESSMENT/PLAN:                                                      1. Hyponatremia  Recheck today will also have her increase her fluid intake.   Results for orders placed or performed in visit on 07/25/18   Sodium   Result Value Ref Range    Sodium 133 133 - 144 mmol/L     May start taking sodium tabs 1 po 2 times per day recheck in 1 week   - Sodium    2. Major depressive disorder, single episode in full remission (H)  Will increase to 150 mg in am she is not more anxious   - buPROPion (WELLBUTRIN XL) 150 MG 24 hr tablet; Take 1 tablet (150 mg) by mouth every morning  Dispense: 30 tablet; Refill: 1  Recheck in  4-6 weeks sooner if worsening  Patient Instructions   Take the new dose of the wellbutrin for the next week  Then you can increase the remeron/mirtazapine to 1 whole tablet the next week   If this is not working then I would like to try adding something that is like effexor but shouldn't have the same side effects     You can start drinking when you are thirsty           reports that she quit smoking about 58 years ago. Her smoking use included Cigarettes. She has a 15.00 pack-year smoking history. She has never used smokeless tobacco.          Constance Cohen M.D.  [facility]

## 2018-07-25 NOTE — PATIENT INSTRUCTIONS
Take the new dose of the wellbutrin for the next week  Then you can increase the remeron/mirtazapine to 1 whole tablet the next week   If this is not working then I would like to try adding something that is like effexor but shouldn't have the same side effects     You can start drinking when you are thirsty

## 2018-07-25 NOTE — MR AVS SNAPSHOT
After Visit Summary   7/25/2018    Arielle Stallworth    MRN: [id number]           Patient Information     Date Of Birth          6/9/1931        Visit Information        Provider Department      7/25/2018 1:30 PM Constance Cohen MD [facility] Diagnoses     Major depressive disorder, single episode in full remission (H)    -  1    Hyponatremia          Care Instructions    Take the new dose of the wellbutrin for the next week  Then you can increase the remeron/mirtazapine to 1 whole tablet the next week   If this is not working then I would like to try adding something that is like effexor but shouldn't have the same side effects     You can start drinking when you are thirsty            Follow-ups after your visit        Who to contact     Normal or non-critical lab and imaging results will be communicated to you by AlchemyAPIhart, letter or phone within 4 business days after the clinic has received the results. If you do not hear from us within 7 days, please contact the clinic through AlchemyAPIhart or phone. If you have a critical or abnormal lab result, we will notify you by phone as soon as possible.  Submit refill requests through Epoxy or call your pharmacy and they will forward the refill request to us. Please allow 3 business days for your refill to be completed.          If you need to speak with a  for additional information , please call: [phone]             Additional Information About Your Visit        AlchemyAPIharBefore the Call Information     Epoxy gives you secure access to your electronic health record. If you see a primary care provider, you can also send messages to your care team and make appointments. If you have questions, please call your primary care clinic.  If you do not have a primary care provider, please call [phone] and they will assist you.        Care EveryWhere ID     This is your Care EveryWhere ID. This could be used by other organizations to  "access your [place] medical records  [id number]        Your Vitals Were     Pulse Temperature Height BMI (Body Mass Index)          84 99  F (37.2  C) (Tympanic) 5' 5\" (1.651 m) 24.13 kg/m2         Blood Pressure from Last 3 Encounters:   07/25/18 120/74   06/14/18 110/70   05/22/18 136/82    Weight from Last 3 Encounters:   07/25/18 145 lb (65.8 kg)   05/22/18 145 lb 11.2 oz (66.1 kg)   04/10/18 149 lb 9.6 oz (67.9 kg)              We Performed the Following     Sodium          Today's Medication Changes          These changes are accurate as of 7/25/18  2:23 PM.  If you have any questions, ask your nurse or doctor.               These medicines have changed or have updated prescriptions.        Dose/Directions    acetaminophen 650 MG 8 hour tablet   This may have changed:    - when to take this  - reasons to take this   Used for:  Left leg cellulitis        Dose:  650 mg   Take 650 mg by mouth every 4 hours as needed for mild pain   Quantity:  100 tablet   Refills:  0       * buPROPion 100 MG 12 hr tablet   Commonly known as:  WELLBUTRIN SR   This may have changed:  Another medication with the same name was added. Make sure you understand how and when to take each.   Used for:  Moderate single current episode of major depressive disorder (H)        Take 1 tablet by mouth in the morning for 2 weeks then you can increase to 1 2 times per day   Quantity:  60 tablet   Refills:  3       * buPROPion 150 MG 24 hr tablet   Commonly known as:  WELLBUTRIN XL   This may have changed:  You were already taking a medication with the same name, and this prescription was added. Make sure you understand how and when to take each.   Used for:  Major depressive disorder, single episode in full remission (H)        Dose:  150 mg   Take 1 tablet (150 mg) by mouth every morning   Quantity:  30 tablet   Refills:  1       * Notice:  This list has 2 medication(s) that are the same as other medications prescribed for you. Read the " directions carefully, and ask your doctor or other care provider to review them with you.      Stop taking these medicines if you haven't already. Please contact your care team if you have questions.     PROBIOTIC DAILY PO           venlafaxine 37.5 MG 24 hr capsule   Commonly known as:  EFFEXOR-XR                Where to get your medicines      These medications were sent to [facility], [place] - [address]  [address] [facility] [address]     Phone:  [phone]     buPROPion 150 MG 24 hr tablet                Primary Care Provider Office Phone # Fax #    Constance Cohen [phone] [address] [facility] [address]        Equal Access to Services     [facility] : Hadii ofe dixon hadasho Soomaali, waaxda luqadaha, qaybta kaalmada adeegyada, vickie zavala . So [facility] [phone].    ATENCIÓN: Si habla español, tiene a earl disposición servicios gratuitos de asistencia lingüística. [facility] [phone].    We comply with applicable federal civil rights laws and [place] laws. We do not discriminate on the basis of race, color, national origin, age, disability, sex, sexual orientation, or gender identity.            Thank you!     Thank you for choosing [facility]  for your care. Our goal is always to provide you with excellent care. Hearing back from our patients is one way we can continue to improve our services. Please take a few minutes to complete the written survey that you may receive in the mail after your visit with us. Thank you!             Your Updated Medication List - Protect others around you: Learn how to safely use, store and throw away your medicines at www.disposemymeds.org.          This list is accurate as of 7/25/18  2:23 PM.  Always use your most recent med list.                   Brand Name Dispense Instructions for use Diagnosis    acetaminophen 650 MG 8 hour tablet     100 tablet    Take 650  mg by mouth every 4 hours as needed for mild pain    Left leg cellulitis       aspirin 81 MG EC tablet     90 tablet    Take 1 tablet (81 mg) by mouth daily    Acquired absence of knee joint       * buPROPion 100 MG 12 hr tablet    WELLBUTRIN SR    60 tablet    Take 1 tablet by mouth in the morning for 2 weeks then you can increase to 1 2 times per day    Moderate single current episode of major depressive disorder (H)       * buPROPion 150 MG 24 hr tablet    WELLBUTRIN XL    30 tablet    Take 1 tablet (150 mg) by mouth every morning    Major depressive disorder, single episode in full remission (H)       Doxycycline Hyclate 150 MG Tabs     60 tablet    Take 150 mg by mouth 2 times daily Resume once course of levaquin is completed    Left leg cellulitis       lisinopril 5 MG tablet    PRINIVIL/ZESTRIL    90 tablet    Take 1 tablet (5 mg) by mouth daily    Benign essential hypertension       LORazepam 0.5 MG tablet    ATIVAN    24 tablet    Take 1 tablet (0.5 mg) by mouth every 8 hours as needed for anxiety    DORON (generalized anxiety disorder)       LUMIGAN 0.01 % Soln   Generic drug:  bimatoprost      Place 1 drop into both eyes At Bedtime.        MELATONIN PO      Take 3 mg by mouth nightly as needed        mirtazapine 15 MG tablet    REMERON    90 tablet    Take 1 tablet (15 mg) by mouth At Bedtime    Moderate single current episode of major depressive disorder (H)       MULTI-VITAMIN PO      Take 1 tablet by mouth daily.        nystatin-triamcinolone ointment    MYCOLOG    30 g    APPLY TO AFFECTED AREA TWO TIMES A DAY    Dermatitis       omeprazole 40 MG capsule    priLOSEC    90 capsule    Take 1 capsule (40 mg) by mouth daily Take 30-60 minutes before a meal.    Gastroesophageal reflux disease without esophagitis       penicillin V potassium 500 MG tablet    VEETID     Take 500 mg by mouth 2 times daily        sodium chloride 1 GM tablet     60 tablet    Take 1 tablet (1 g) by mouth 2 times daily     Hyponatremia       STOOL SOFTENER PO      Take 100 mg by mouth daily.        VITAMIN D-3 PO           * Notice:  This list has 2 medication(s) that are the same as other medications prescribed for you. Read the directions carefully, and ask your doctor or other care provider to review them with you.

## 2018-07-26 ENCOUNTER — TELEPHONE (OUTPATIENT)
Dept: FAMILY MEDICINE | Facility: CLINIC | Age: 83
End: 2018-07-26

## 2018-07-26 DIAGNOSIS — E87.1 HYPONATREMIA: Primary | ICD-10-CM

## 2018-07-26 NOTE — TELEPHONE ENCOUNTER
Reason for call:  Patient reporting a symptom    Symptom or request: Frances calling for sodium levels, which were done yesterday.  She also wants to know how much sodium she should be on daily?  Could you please review and advise. Thank you..Lucia Edwards    Duration (how long have symptoms been present): on going    Have you been treated for this before? Yes    Phone Number patient can be reached at:  Home number on file [phone] (home)    Best Time:  Any time    Can we leave a detailed message on this number:  YES    Call taken on 7/26/2018 at 2:27 PM by Lucia Edwards

## 2018-07-30 NOTE — TELEPHONE ENCOUNTER
Frances notified of normal lab of 133. Frances says that Arielle has been taking 2 grams of sodium twice per day. Frances wants to know if she should continue with this dose? Thank you.  Bryant Raman RN

## 2018-07-31 NOTE — TELEPHONE ENCOUNTER
Frances, daughter, notified of all of Dr. Cohen's instructions as noted below. Frances agreed with plan. She said she will call back to schedule a lab only for next week.  Bryant Raman RN

## 2018-07-31 NOTE — TELEPHONE ENCOUNTER
Lets try decreasing to 1 g 2 times per day and recheck in 1 week to see how this is  She is also increaing her fluid intake. Constance Cohen M.D.

## 2018-08-27 ENCOUNTER — TELEPHONE (OUTPATIENT)
Dept: FAMILY MEDICINE | Facility: CLINIC | Age: 83
End: 2018-08-27

## 2018-08-27 ENCOUNTER — COMMUNICATION - HEALTHEAST (OUTPATIENT)
Dept: ADMINISTRATIVE | Facility: CLINIC | Age: 83
End: 2018-08-27

## 2018-08-27 DIAGNOSIS — E87.1 HYPONATREMIA: ICD-10-CM

## 2018-08-27 LAB — SODIUM SERPL-SCNC: 126 MMOL/L (ref 133–144)

## 2018-08-27 PROCEDURE — 36415 COLL VENOUS BLD VENIPUNCTURE: CPT | Performed by: FAMILY MEDICINE

## 2018-08-27 PROCEDURE — 84295 ASSAY OF SERUM SODIUM: CPT | Performed by: FAMILY MEDICINE

## 2018-08-27 RX ORDER — PENICILLIN V POTASSIUM 500 MG/1
500 TABLET, FILM COATED ORAL 2 TIMES DAILY
Status: CANCELLED | OUTPATIENT
Start: 2018-08-27

## 2018-08-27 NOTE — TELEPHONE ENCOUNTER
Call placed to patient. She reports Infectious disease , Dr Redd, wants her to continue Penicillin and Doxycycline. Reviewed with patient R Regan note discussing this antibiotic use 6/21/18. Patient insists Dr Redd wants her on  Both antibiotics. Encouraged patient to contact Dr Redd as he would be the provider ordering recommended antibiotics, She agreed with the plan. She will contact her Infectious Disease provider to continue Antibiotics. Shy BOX/DULCE MARIA

## 2018-08-27 NOTE — TELEPHONE ENCOUNTER
Reason for Call:  Other prescription    Detailed comments: Arielle is calling wondering why Barbara denied her penicillin.  Arielle states that she sheets been on penicillin for quite a while and is suppose to be continuing it forever.  Please review and call.  Thank you..Lucia Edwards    Phone Number Patient can be reached at: Home number on file [phone] (home)    Best Time: any time    Can we leave a detailed message on this number? YES    Call taken on 8/27/2018 at 9:23 AM by Lucia Edwards

## 2018-08-27 NOTE — TELEPHONE ENCOUNTER
I reviewed the 6/21/18 refill encounter. To clarify, she is taking both penicillin and doxycycline long term. I had just meant that the doxycycline was already approved by [facility] at that time and that the patient did not need a refill of penicillin at that time (see care everywhere, this was refilled by Dr. Redd 6/7/18). I do agree that both the prescriptions should come from [facility] as they are managing that. This may need to be clarified with the patient depending on the conversation.  Barbara Spears PA-C

## 2018-08-28 ENCOUNTER — TELEPHONE (OUTPATIENT)
Dept: FAMILY MEDICINE | Facility: CLINIC | Age: 83
End: 2018-08-28

## 2018-08-28 NOTE — TELEPHONE ENCOUNTER
[facility]  Daughter has been notified of Dr. Vicki ventura and Arielle will re start the sodium as advised.    She will be back in to have her sodium rechecked in 1 week.    Thank you..Lucia Edwards

## 2018-09-07 ENCOUNTER — NURSE TRIAGE (OUTPATIENT)
Dept: NURSING | Facility: CLINIC | Age: 83
End: 2018-09-07

## 2018-09-07 ENCOUNTER — TELEPHONE (OUTPATIENT)
Dept: FAMILY MEDICINE | Facility: CLINIC | Age: 83
End: 2018-09-07

## 2018-09-07 DIAGNOSIS — E87.1 HYPONATREMIA: Primary | ICD-10-CM

## 2018-09-07 DIAGNOSIS — E87.1 HYPONATREMIA: ICD-10-CM

## 2018-09-07 LAB — SODIUM SERPL-SCNC: 132 MMOL/L (ref 133–144)

## 2018-09-07 PROCEDURE — 36415 COLL VENOUS BLD VENIPUNCTURE: CPT | Performed by: FAMILY MEDICINE

## 2018-09-07 PROCEDURE — 84295 ASSAY OF SERUM SODIUM: CPT | Performed by: FAMILY MEDICINE

## 2018-09-07 NOTE — TELEPHONE ENCOUNTER
Daughter calling for results of Na+ level done today. States pt signed JOSÉ ANTONIO for her to get information. Informed of result per Dr Cohen's note in lab section. Daughter voiced understanding,no other questions. Courtney Bowers RN/NEAL    Reason for Disposition    Caller requesting lab results    Additional Information    Negative: Lab calling with strep throat test results and triager can call in prescription    Negative: Lab calling with urinalysis test results and triager can call in prescription    Negative: Medication questions    Negative: ED call to PCP    Negative: Physician call to PCP    Negative: Call about patient who is currently hospitalized    Negative: Lab or radiology calling with CRITICAL test results    Negative: [1] Prescription not at pharmacy AND [2] was prescribed today by PCP    Negative: [1] Follow-up call from patient regarding patient's clinical status AND [2] information urgent    Negative: [1] Caller requests to speak ONLY to PCP AND [2] URGENT question    Negative: [1] Caller requests to speak to PCP now AND [2] won't tell us reason for call  (Exception: if 10 pm to 6 am, caller must first discuss reason for the call)    Negative: Notification of hospital admission    Negative: Notification of death    Protocols used: PCP CALL - NO TRIAGE-ADULT-

## 2018-09-07 NOTE — TELEPHONE ENCOUNTER
Patient here in lab for Sodium recheck, see result note on 8-27-18, sodium lab ordered per Dr. Cohen's instruction.    OLIVER Samayoa

## 2018-09-12 ENCOUNTER — TELEPHONE (OUTPATIENT)
Dept: FAMILY MEDICINE | Facility: CLINIC | Age: 83
End: 2018-09-12

## 2018-09-12 NOTE — TELEPHONE ENCOUNTER
If she is not responding to medications and is really depressed it works very well for some people. I know it sound barbaric but it works really well at times. Constance Cohen M.D.

## 2018-09-12 NOTE — TELEPHONE ENCOUNTER
Reason for Call:  Other 2nd opinion    Detailed comments: Daughter LEFT MESSAGE:  Arielle was advised to have a ECT and she'd like a 2nd opinion.  What are your thoughts?  Please review and advise.  Thank you..Lucia Edwards    Phone Number Patient can be reached at: Other phone number:  [phone]*    Best Time: any time    Can we leave a detailed message on this number? YES    Call taken on 9/12/2018 at 3:48 PM by Lucia Edwards

## 2018-09-13 NOTE — TELEPHONE ENCOUNTER
Frances notified of Dr. Cohen's message.  She'd like to discuss with you on the phone.    Would you like me to schedule a telephone visit?  Please review and advise. Thank you..Luica Edwards

## 2018-09-17 NOTE — TELEPHONE ENCOUNTER
Frances calling back. She wants the doctor to call her. She does not want it to be a phone visit. She will send a [employer] message.  Kusum Hoyt  CSS

## 2018-09-18 ENCOUNTER — TRANSFERRED RECORDS (OUTPATIENT)
Dept: HEALTH INFORMATION MANAGEMENT | Facility: CLINIC | Age: 83
End: 2018-09-18

## 2018-09-18 ENCOUNTER — OFFICE VISIT - HEALTHEAST (OUTPATIENT)
Dept: INFECTIOUS DISEASES | Facility: CLINIC | Age: 83
End: 2018-09-18

## 2018-09-18 DIAGNOSIS — T84.59XS INFECTED PROSTHETIC KNEE JOINT, SEQUELA: ICD-10-CM

## 2018-09-18 DIAGNOSIS — T84.59XD INFECTION OF PROSTHETIC KNEE JOINT, SUBSEQUENT ENCOUNTER: ICD-10-CM

## 2018-09-18 DIAGNOSIS — Z96.659 INFECTION OF PROSTHETIC KNEE JOINT, SUBSEQUENT ENCOUNTER: ICD-10-CM

## 2018-09-18 DIAGNOSIS — Z96.659 INFECTED PROSTHETIC KNEE JOINT, SEQUELA: ICD-10-CM

## 2018-09-18 LAB — C REACTIVE PROTEIN LHE: 0.2 MG/DL (ref 0–0.8)

## 2018-09-19 ENCOUNTER — OFFICE VISIT (OUTPATIENT)
Dept: FAMILY MEDICINE | Facility: CLINIC | Age: 83
End: 2018-09-19
Payer: MEDICARE

## 2018-09-19 VITALS
TEMPERATURE: 98.3 F | WEIGHT: 142.6 LBS | SYSTOLIC BLOOD PRESSURE: 137 MMHG | HEIGHT: 65 IN | DIASTOLIC BLOOD PRESSURE: 77 MMHG | HEART RATE: 88 BPM | BODY MASS INDEX: 23.76 KG/M2

## 2018-09-19 DIAGNOSIS — E87.1 HYPONATREMIA: Primary | ICD-10-CM

## 2018-09-19 DIAGNOSIS — F33.2 SEVERE EPISODE OF RECURRENT MAJOR DEPRESSIVE DISORDER, WITHOUT PSYCHOTIC FEATURES (H): ICD-10-CM

## 2018-09-19 DIAGNOSIS — Z23 NEED FOR PROPHYLACTIC VACCINATION AND INOCULATION AGAINST INFLUENZA: ICD-10-CM

## 2018-09-19 LAB
ALBUMIN SERPL-MCNC: 3.5 G/DL (ref 3.4–5)
ANION GAP SERPL CALCULATED.3IONS-SCNC: 5 MMOL/L (ref 3–14)
BUN SERPL-MCNC: 18 MG/DL (ref 7–30)
CALCIUM SERPL-MCNC: 8.6 MG/DL (ref 8.5–10.1)
CHLORIDE SERPL-SCNC: 99 MMOL/L (ref 94–109)
CO2 SERPL-SCNC: 27 MMOL/L (ref 20–32)
CREAT SERPL-MCNC: 0.6 MG/DL (ref 0.52–1.04)
GFR SERPL CREATININE-BSD FRML MDRD: >90 ML/MIN/1.7M2
GLUCOSE SERPL-MCNC: 102 MG/DL (ref 70–99)
PHOSPHATE SERPL-MCNC: 3.4 MG/DL (ref 2.5–4.5)
POTASSIUM SERPL-SCNC: 4.7 MMOL/L (ref 3.4–5.3)
SODIUM SERPL-SCNC: 131 MMOL/L (ref 133–144)

## 2018-09-19 PROCEDURE — G0008 ADMIN INFLUENZA VIRUS VAC: HCPCS | Performed by: FAMILY MEDICINE

## 2018-09-19 PROCEDURE — 36415 COLL VENOUS BLD VENIPUNCTURE: CPT | Performed by: FAMILY MEDICINE

## 2018-09-19 PROCEDURE — 90662 IIV NO PRSV INCREASED AG IM: CPT | Performed by: FAMILY MEDICINE

## 2018-09-19 PROCEDURE — 80069 RENAL FUNCTION PANEL: CPT | Performed by: FAMILY MEDICINE

## 2018-09-19 PROCEDURE — 99213 OFFICE O/P EST LOW 20 MIN: CPT | Mod: 25 | Performed by: FAMILY MEDICINE

## 2018-09-19 ASSESSMENT — ANXIETY QUESTIONNAIRES
6. BECOMING EASILY ANNOYED OR IRRITABLE: SEVERAL DAYS
2. NOT BEING ABLE TO STOP OR CONTROL WORRYING: SEVERAL DAYS
GAD7 TOTAL SCORE: 5
7. FEELING AFRAID AS IF SOMETHING AWFUL MIGHT HAPPEN: NOT AT ALL
5. BEING SO RESTLESS THAT IT IS HARD TO SIT STILL: NOT AT ALL
3. WORRYING TOO MUCH ABOUT DIFFERENT THINGS: SEVERAL DAYS
1. FEELING NERVOUS, ANXIOUS, OR ON EDGE: SEVERAL DAYS

## 2018-09-19 ASSESSMENT — PAIN SCALES - GENERAL: PAINLEVEL: NO PAIN (0)

## 2018-09-19 ASSESSMENT — PATIENT HEALTH QUESTIONNAIRE - PHQ9: 5. POOR APPETITE OR OVEREATING: SEVERAL DAYS

## 2018-09-19 NOTE — MR AVS SNAPSHOT
"              After Visit Summary   9/19/2018    Arielle Stallworth    MRN: [id number]           Patient Information     Date Of Birth          6/9/1931        Visit Information        Provider Department      9/19/2018 3:30 PM Constance Cohen MD [facility]        Today's Diagnoses     Hyponatremia    -  1    Severe episode of recurrent major depressive disorder, without psychotic features (H)          Care Instructions    I will send you the labs when they are back  Let me know how everything goes             Follow-ups after your visit        Who to contact     Normal or non-critical lab and imaging results will be communicated to you by Ciashophart, letter or phone within 4 business days after the clinic has received the results. If you do not hear from us within 7 days, please contact the clinic through Ciashophart or phone. If you have a critical or abnormal lab result, we will notify you by phone as soon as possible.  Submit refill requests through hiyalife or call your pharmacy and they will forward the refill request to us. Please allow 3 business days for your refill to be completed.          If you need to speak with a  for additional information , please call: [phone]             Additional Information About Your Visit        MyChart Information     hiyalife gives you secure access to your electronic health record. If you see a primary care provider, you can also send messages to your care team and make appointments. If you have questions, please call your primary care clinic.  If you do not have a primary care provider, please call [phone] and they will assist you.        Care EveryWhere ID     This is your Care EveryWhere ID. This could be used by other organizations to access your [place] medical records  [id number]        Your Vitals Were     Pulse Temperature Height BMI (Body Mass Index)          88 98.3  F (36.8  C) (Tympanic) 5' 5\" (1.651 m) 23.73 kg/m2         Blood " Pressure from Last 3 Encounters:   09/19/18 137/77   07/25/18 120/74   06/14/18 110/70    Weight from Last 3 Encounters:   09/19/18 142 lb 9.6 oz (64.7 kg)   07/25/18 145 lb (65.8 kg)   05/22/18 145 lb 11.2 oz (66.1 kg)              We Performed the Following     Renal panel (Alb, BUN, Ca, Cl, CO2, Creat, Gluc, Phos, K, Na)          Today's Medication Changes          These changes are accurate as of 9/19/18  4:23 PM.  If you have any questions, ask your nurse or doctor.               These medicines have changed or have updated prescriptions.        Dose/Directions    acetaminophen 650 MG 8 hour tablet   This may have changed:    - when to take this  - reasons to take this   Used for:  Left leg cellulitis        Dose:  650 mg   Take 650 mg by mouth every 4 hours as needed for mild pain   Quantity:  100 tablet   Refills:  0                Primary Care Provider Office Phone # Fax #    Constance Cohen [phone] [address] [facility] [address]        Equal Access to Services     [facility] AH: Hadii ofe emery Soabigail, waaxda luqcandie, qaybta kaalmamary cali, vickie yin. So [facility] [phone].    ATENCIÓN: Si habla español, tiene a earl disposición servicios gratuitos de asistencia lingüística. [facility] [phone].    We comply with applicable federal civil rights laws and [place] laws. We do not discriminate on the basis of race, color, national origin, age, disability, sex, sexual orientation, or gender identity.            Thank you!     Thank you for choosing [facility]  for your care. Our goal is always to provide you with excellent care. Hearing back from our patients is one way we can continue to improve our services. Please take a few minutes to complete the written survey that you may receive in the mail after your visit with us. Thank you!             Your Updated Medication List - Protect others around you: Learn how to safely  use, store and throw away your medicines at www.disposemymeds.org.          This list is accurate as of 9/19/18  4:23 PM.  Always use your most recent med list.                   Brand Name Dispense Instructions for use Diagnosis    acetaminophen 650 MG 8 hour tablet     100 tablet    Take 650 mg by mouth every 4 hours as needed for mild pain    Left leg cellulitis       aspirin 81 MG EC tablet     90 tablet    Take 1 tablet (81 mg) by mouth daily    Acquired absence of knee joint       * buPROPion 100 MG 12 hr tablet    WELLBUTRIN SR    60 tablet    Take 1 tablet by mouth in the morning for 2 weeks then you can increase to 1 2 times per day    Moderate single current episode of major depressive disorder (H)       * buPROPion 150 MG 24 hr tablet    WELLBUTRIN XL    30 tablet    Take 1 tablet (150 mg) by mouth every morning    Major depressive disorder, single episode in full remission (H)       Doxycycline Hyclate 150 MG Tabs     60 tablet    Take 150 mg by mouth 2 times daily Resume once course of levaquin is completed    Left leg cellulitis       lisinopril 5 MG tablet    PRINIVIL/ZESTRIL    90 tablet    Take 1 tablet (5 mg) by mouth daily    Benign essential hypertension       LORazepam 0.5 MG tablet    ATIVAN    24 tablet    Take 1 tablet (0.5 mg) by mouth every 8 hours as needed for anxiety    DORON (generalized anxiety disorder)       LUMIGAN 0.01 % Soln   Generic drug:  bimatoprost      Place 1 drop into both eyes At Bedtime.        MELATONIN PO      Take 3 mg by mouth nightly as needed        mirtazapine 15 MG tablet    REMERON    90 tablet    Take 1 tablet (15 mg) by mouth At Bedtime    Moderate single current episode of major depressive disorder (H)       MULTI-VITAMIN PO      Take 1 tablet by mouth daily.        nystatin-triamcinolone ointment    MYCOLOG    30 g    APPLY TO AFFECTED AREA TWO TIMES A DAY    Dermatitis       omeprazole 40 MG capsule    priLOSEC    90 capsule    Take 1 capsule (40 mg) by mouth  daily Take 30-60 minutes before a meal.    Gastroesophageal reflux disease without esophagitis       penicillin V potassium 500 MG tablet    VEETID     Take 500 mg by mouth 2 times daily        sodium chloride 1 GM tablet     60 tablet    Take 1 tablet (1 g) by mouth 2 times daily    Hyponatremia       STOOL SOFTENER PO      Take 100 mg by mouth daily.        VITAMIN D-3 PO           * Notice:  This list has 2 medication(s) that are the same as other medications prescribed for you. Read the directions carefully, and ask your doctor or other care provider to review them with you.

## 2018-09-19 NOTE — PROGRESS NOTES
SUBJECTIVE:                                                    Arielle Stallworth is a 87 year old female who presents to clinic today for the following health issues:    Depression Followup    Status since last visit: Worsened     See PHQ-9 for current symptoms.  Other associated symptoms: None    Complicating factors:   Significant life event:  No   Current substance abuse:  None  Anxiety or Panic symptoms:  Yes    PHQ-9 2/15/2017 10/11/2017 4/10/2018   Total Score 4 4 3   Q9: Suicide Ideation Not at all Not at all Not at all       PHQ-9  English  PHQ-9   Any Language  Suicide Assessment Five-step Evaluation and Treatment (SAFE-T)    Amount of exercise or physical activity: 2-3 days/week for an average of different times    Problems taking medications regularly: No, not taking bupropion.    Medication side effects: none    Diet: regular (no restrictions)        Problem list and histories reviewed & adjusted, as indicated.  Additional history: taking 4 g of sodium and just maintaining   Her psychiatrist wants her to have ECT she has no contraindications to this. 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      HYSTERECTOMY       HYSTERECTOMY RADICAL      2002     JOINT REPLACEMENT      Left x2 and rt x1     ORTHOPEDIC SURGERY      Bilat. knees       Social History   Substance Use Topics     Smoking status: Former Smoker     Packs/day: 0.50     Years: 30.00     Types: Cigarettes     Quit date: 1/1/1960     Smokeless tobacco: Never Used     Alcohol use Yes      Comment: Occasional     History reviewed. No pertinent family history.        ROS:  Constitutional, HEENT, cardiovascular, pulmonary, gi and gu systems are negative, except as otherwise noted.    OBJECTIVE:                                                    /77 (BP Location: Right arm, Patient Position: Sitting, Cuff Size: Adult Regular)  Pulse 88  Temp 98.3  F (36.8  C) (Tympanic)  Ht 5' 5\" (1.651 m)  Wt 142 lb 9.6 oz (64.7 kg)  BMI 23.73 kg/m2 Body mass index is 23.73 kg/(m^2).   GENERAL APPEARANCE: healthy, alert and no distress  MENTAL STATUS EXAM:    1. Clinical observations: Arielle was clean and was adequately groomed. Arielle's emotional presentation was open and cooperative. She spoke clear and articulate. She maintained good eye contact and she was cooperative in answering questions.   2. She appeared to be well-oriented in all spheres with coherent, logical, goal directed and relevent thinking.   3. Thought content: She reports no abnormal thought process.   4. Affect and mood: Selvins affect is described as sad/depressed and her emotional attitude was open and cooperative. She reports the following sypmtoms: sad feeling or depressed, difficulty sleeping, drawing away from people and lack of interest or enjoyment.    5. Sensorium and cognition: She was in contact with reality and oriented to time, place and person.  She demonstrated no impairment in immediate, recent, or remote memory. Her insight was adequate " and her  intelligence appeared to be average.         ASSESSMENT/PLAN:                                                      1. Hyponatremia  Stable on the 4 g of sodium right now   - Renal panel (Alb, BUN, Ca, Cl, CO2, Creat, Gluc, Phos, K, Na)    2. Severe episode of recurrent major depressive disorder, without psychotic features (H)  We discussed her previous response to ect she did well and it is likely that she will respond well to this course. She is ok with proceeding and she will schedule with her psychiatrist. She has a pacemaker which will actually make this easier        reports that she quit smoking about 58 years ago. Her smoking use included Cigarettes. She has a 15.00 pack-year smoking history. She has never used smokeless tobacco.          Constance Cohen M.D.    [facility]

## 2018-09-20 ENCOUNTER — TRANSFERRED RECORDS (OUTPATIENT)
Dept: HEALTH INFORMATION MANAGEMENT | Facility: CLINIC | Age: 83
End: 2018-09-20

## 2018-09-20 ENCOUNTER — AMBULATORY - HEALTHEAST (OUTPATIENT)
Dept: CARDIOLOGY | Facility: CLINIC | Age: 83
End: 2018-09-20

## 2018-09-20 DIAGNOSIS — Z95.0 CARDIAC PACEMAKER IN SITU: ICD-10-CM

## 2018-09-20 LAB
HCC DEVICE COMMENTS: NORMAL
HCC DEVICE IMPLANTING PROVIDER: NORMAL
HCC DEVICE MANUFACTURE: NORMAL
HCC DEVICE MODEL: NORMAL
HCC DEVICE SERIAL NUMBER: NORMAL
HCC DEVICE TYPE: NORMAL

## 2018-09-20 ASSESSMENT — PATIENT HEALTH QUESTIONNAIRE - PHQ9: SUM OF ALL RESPONSES TO PHQ QUESTIONS 1-9: 7

## 2018-09-20 ASSESSMENT — ANXIETY QUESTIONNAIRES: GAD7 TOTAL SCORE: 5

## 2018-09-21 ENCOUNTER — TRANSFERRED RECORDS (OUTPATIENT)
Dept: HEALTH INFORMATION MANAGEMENT | Facility: CLINIC | Age: 83
End: 2018-09-21

## 2018-09-21 NOTE — PROGRESS NOTES

## 2018-09-22 LAB
ALT SERPL-CCNC: 15 IU/L (ref 8–45)
AST SERPL-CCNC: 19 IU/L (ref 2–40)
CHOLEST SERPL-MCNC: 190 MG/DL (ref 100–199)
CREAT SERPL-MCNC: 0.61 MG/DL (ref 0.57–1.11)
GFR SERPL CREATININE-BSD FRML MDRD: >60 ML/MIN/1.73M2
GLUCOSE SERPL-MCNC: 90 MG/DL (ref 65–100)
HBA1C MFR BLD: 5.5 % (ref 0–5.7)
HDLC SERPL-MCNC: 46 MG/DL
LDLC SERPL CALC-MCNC: 123 MG/DL
NONHDLC SERPL-MCNC: 144 MG/DL
PHQ9 SCORE: 5
POTASSIUM SERPL-SCNC: 4.6 MMOL/L (ref 3.5–5)
TRIGL SERPL-MCNC: 103 MG/DL
TSH SERPL-ACNC: 1.43 UIU/ML (ref 0.35–4.94)

## 2018-09-26 LAB — PHQ9 SCORE: 13

## 2018-09-28 ENCOUNTER — TRANSFERRED RECORDS (OUTPATIENT)
Dept: HEALTH INFORMATION MANAGEMENT | Facility: CLINIC | Age: 83
End: 2018-09-28

## 2018-09-28 LAB — PHQ9 SCORE: 13

## 2018-10-01 LAB — PHQ9 SCORE: 1

## 2018-10-03 ENCOUNTER — COMMUNICATION - HEALTHEAST (OUTPATIENT)
Dept: INFECTIOUS DISEASES | Facility: CLINIC | Age: 83
End: 2018-10-03

## 2018-10-03 ENCOUNTER — COMMUNICATION - HEALTHEAST (OUTPATIENT)
Dept: ADMINISTRATIVE | Facility: CLINIC | Age: 83
End: 2018-10-03

## 2018-10-03 DIAGNOSIS — I10 BENIGN ESSENTIAL HYPERTENSION: ICD-10-CM

## 2018-10-03 RX ORDER — LISINOPRIL 5 MG/1
5 TABLET ORAL DAILY
Qty: 90 TABLET | Refills: 1 | Status: CANCELLED | OUTPATIENT
Start: 2018-10-03

## 2018-10-03 NOTE — TELEPHONE ENCOUNTER
"lisinopril (PRINIVIL/ZESTRIL) 5 MG tablet        Last Written Prescription Date:  4/10/18  Last Fill Quantity: 90,   # refills: 1  Last Office Visit: 9/19/18  Future Office visit:    Next 5 appointments (look out 90 days)     Oct 04, 2018 11:20 AM CDT   Office Visit with Barbara Spears PA-C   [facility]    [id number] [id number] [phone]                   Requested Prescriptions   Pending Prescriptions Disp Refills     lisinopril (PRINIVIL/ZESTRIL) 5 MG tablet 90 tablet 1     Sig: Take 1 tablet (5 mg) by mouth daily    ACE Inhibitors (Including Combos) Protocol Passed    10/3/2018 11:04 AM       Passed - Blood pressure under 140/90 in past 12 months    BP Readings from Last 3 Encounters:   09/19/18 137/77   07/25/18 120/74   06/14/18 110/70                Passed - Recent (12 mo) or future (30 days) visit within the authorizing provider's specialty    Patient had office visit in the last 12 months or has a visit in the next 30 days with authorizing provider or within the authorizing provider's specialty.  See \"Patient Info\" tab in inbasket, or \"Choose Columns\" in Meds & Orders section of the refill encounter.           Passed - Patient is age 18 or older       Passed - No active pregnancy on record       Passed - Normal serum creatinine on file in past 12 months    Recent Labs   Lab Test  09/19/18   1617   CR  0.60            Passed - Normal serum potassium on file in past 12 months    Recent Labs   Lab Test  09/19/18   1617   POTASSIUM  4.7            Passed - No positive pregnancy test in past 12 months          "

## 2018-10-04 ENCOUNTER — OFFICE VISIT (OUTPATIENT)
Dept: FAMILY MEDICINE | Facility: CLINIC | Age: 83
End: 2018-10-04
Payer: MEDICARE

## 2018-10-04 VITALS
DIASTOLIC BLOOD PRESSURE: 82 MMHG | BODY MASS INDEX: 23.15 KG/M2 | OXYGEN SATURATION: 100 % | SYSTOLIC BLOOD PRESSURE: 135 MMHG | HEART RATE: 78 BPM | WEIGHT: 139.1 LBS | TEMPERATURE: 99.5 F

## 2018-10-04 DIAGNOSIS — I10 BENIGN ESSENTIAL HYPERTENSION: ICD-10-CM

## 2018-10-04 DIAGNOSIS — F33.2 SEVERE EPISODE OF RECURRENT MAJOR DEPRESSIVE DISORDER, WITHOUT PSYCHOTIC FEATURES (H): Primary | ICD-10-CM

## 2018-10-04 DIAGNOSIS — E87.1 HYPONATREMIA: ICD-10-CM

## 2018-10-04 LAB — SODIUM SERPL-SCNC: 134 MMOL/L (ref 133–144)

## 2018-10-04 PROCEDURE — 36415 COLL VENOUS BLD VENIPUNCTURE: CPT | Performed by: PHYSICIAN ASSISTANT

## 2018-10-04 PROCEDURE — 99495 TRANSJ CARE MGMT MOD F2F 14D: CPT | Performed by: PHYSICIAN ASSISTANT

## 2018-10-04 PROCEDURE — 84295 ASSAY OF SERUM SODIUM: CPT | Performed by: PHYSICIAN ASSISTANT

## 2018-10-04 RX ORDER — MIRTAZAPINE 30 MG/1
30 TABLET, FILM COATED ORAL
COMMUNITY
Start: 2018-10-02

## 2018-10-04 RX ORDER — LISINOPRIL 5 MG/1
5 TABLET ORAL DAILY
Qty: 90 TABLET | Refills: 1 | Status: SHIPPED | OUTPATIENT
Start: 2018-10-04 | End: 2019-03-25

## 2018-10-04 NOTE — PATIENT INSTRUCTIONS
Keep follow up with psychiatry  Follow up with Dr. Cohen in 1 month, sooner if needed  Continue current medications

## 2018-10-04 NOTE — PROGRESS NOTES
SUBJECTIVE:   Arielle Stallworth is a 87 year old female who presents to clinic today for the following health issues:        Hospital Follow-up Visit:    Hospital/Nursing Home/IP Rehab Facility: [facility]  Date of Admission: 9/21/2018  Date of Discharge: 10/2/2018  Reason(s) for Admission: Depression            Problems taking medications regularly:  None       Medication changes since discharge: None       Problems adhering to non-medication therapy:  None    Summary of hospitalization:  CareEverywhere information obtained and reviewed  Diagnostic Tests/Treatments reviewed.  Follow up needed: sodium  Other Healthcare Providers Involved in Patient s Care:         Specialist appointment - Psychiatry Jay Perry 10/17/18   Update since discharge: improved.     Post Discharge Medication Reconciliation: discharge medications reconciled, continue medications without change.  Plan of care communicated with patient and daughter     Coding guidelines for this visit:  Type of Medical   Decision Making Face-to-Face Visit       within 7 Days of discharge Face-to-Face Visit        within 14 days of discharge   Moderate Complexity [id number]   High Complexity [id number]          Patient is in with daughter Frances  Sodium was stable  In hospital  They were hoping that the ECT would reset too  Decreased ativan a little - using two times daily scheduled, with PRN. Rather than TID  She started seeing Dr. Gross 6 months ago for psychiatry    Constipation is better - stool softener, miralax helps  Eating/drinking normally  Has fluid restriction 1L  She feels well    Daughter has noticed a huge improvement in patient's mood, patient states she feels fine    Problem list and histories reviewed & adjusted, as indicated.  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      HYSTERECTOMY       HYSTERECTOMY RADICAL      2002     JOINT REPLACEMENT      Left x2 and rt x1     ORTHOPEDIC SURGERY      Bilat. knees       Social History   Substance Use Topics     Smoking status: Former Smoker     Packs/day: 0.50     Years: 30.00     Types: Cigarettes     Quit date: 1/1/1960     Smokeless tobacco: Never Used     Alcohol use Yes      Comment: Occasional     History reviewed. No pertinent family history.      Labs reviewed in EPIC    Reviewed and updated as needed this visit by clinical staff  Tobacco  Allergies  Meds  Problems  Med Hx  Surg Hx  Fam Hx  Soc Hx        Reviewed and updated as needed this visit by Provider  Allergies  Meds  Problems         ROS:  Other than noted above, general, HEENT, respiratory, cardiac, MS, and gastrointestinal systems are negative.     OBJECTIVE:     /82  Pulse 78  Temp 99.5  F (37.5  C) (Tympanic)  Wt 139 lb 1.6 oz (63.1 kg)  SpO2 100%  BMI 23.15 kg/m2  Body mass index is 23.15 kg/(m^2).  GENERAL: healthy, alert and no distress  RESP: lungs clear to auscultation - no rales, rhonchi or wheezes  CV: regular rate and rhythm, normal S1 S2, no S3 or S4, no murmur, click or rub, no  peripheral edema and peripheral pulses strong  ABDOMEN: soft, nontender, no hepatosplenomegaly, no masses and bowel sounds normal  MS: no gross musculoskeletal defects noted, no edema  PSYCH: PSYCH: Alert and oriented times 3; speech- coherent , normal rate and volume; able to articulate logical thoughts, able to abstract reason, no tangential thoughts, no hallucinations or delusions, affect- normal/somewhat flat but good eye contact, appropriate responses     ASSESSMENT/PLAN:     ASSESSMENT/PLAN:      ICD-10-CM    1. Severe episode of recurrent major depressive disorder, without psychotic features (H) F33.2    2. Hyponatremia E87.1 Sodium   3. Benign essential hypertension I10 lisinopril (PRINIVIL/ZESTRIL) 5 MG tablet       Patient Instructions   Keep follow up with psychiatry  Follow up with Dr. Cohen in 1 month, sooner if needed  Continue current medications    Barbara Spears PA-C  [facility]

## 2018-10-04 NOTE — LETTER
My Depression Action Plan  Name: Arielle Stallworth   Date of Birth 6/9/1931  Date: 10/4/2018    My doctor: Constance Cohen   My clinic: [address] [address] [phone]          GREEN    ZONE   Good Control    What it looks like:     Things are going generally well. You have normal up s and down s. You may even feel depressed from time to time, but bad moods usually last less than a day.   What you need to do:  1. Continue to care for yourself (see self care plan)  2. Check your depression survival kit and update it as needed  3. Follow your physician s recommendations including any medication.  4. Do not stop taking medication unless you consult with your physician first.           YELLOW         ZONE Getting Worse    What it looks like:     Depression is starting to interfere with your life.     It may be hard to get out of bed; you may be starting to isolate yourself from others.    Symptoms of depression are starting to last most all day and this has happened for several days.     You may have suicidal thoughts but they are not constant.   What you need to do:     1. Call your care team, your response to treatment will improve if you keep your care team informed of your progress. Yellow periods are signs an adjustment may need to be made.     2. Continue your self-care, even if you have to fake it!    3. Talk to someone in your support network    4. Open up your depression survival kit           RED    ZONE Medical Alert - Get Help    What it looks like:     Depression is seriously interfering with your life.     You may experience these or other symptoms: You can t get out of bed most days, can t work or engage in other necessary activities, you have trouble taking care of basic hygiene, or basic responsibilities, thoughts of suicide or death that will not go away, self-injurious behavior.     What you need to do:  1. Call your care team and request a  same-day appointment. If they are not available (weekends or after hours) call your local crisis line, emergency room or 911.            Depression Self Care Plan / Survival Kit    Self-Care for Depression  Here s the deal. Your body and mind are really not as separate as most people think.  What you do and think affects how you feel and how you feel influences what you do and think. This means if you do things that people who feel good do, it will help you feel better.  Sometimes this is all it takes.  There is also a place for medication and therapy depending on how severe your depression is, so be sure to consult with your medical provider and/ or Behavioral Health Consultant if your symptoms are worsening or not improving.     In order to better manage my stress, I will:    Exercise  Get some form of exercise, every day. This will help reduce pain and release endorphins, the  feel good  chemicals in your brain. This is almost as good as taking antidepressants!  This is not the same as joining a gym and then never going! (they count on that by the way ) It can be as simple as just going for a walk or doing some gardening, anything that will get you moving.      Hygiene   Maintain good hygiene (Get out of bed in the morning, Make your bed, Brush your teeth, Take a shower, and Get dressed like you were going to work, even if you are unemployed).  If your clothes don't fit try to get ones that do.    Diet  I will strive to eat foods that are good for me, drink plenty of water, and avoid excessive sugar, caffeine, alcohol, and other mood-altering substances.  Some foods that are helpful in depression are: complex carbohydrates, B vitamins, flaxseed, fish or fish oil, fresh fruits and vegetables.    Psychotherapy  I agree to participate in Individual Therapy (if recommended).    Medication  If prescribed medications, I agree to take them.  Missing doses can result in serious side effects.  I understand that drinking  alcohol, or other illicit drug use, may cause potential side effects.  I will not stop my medication abruptly without first discussing it with my provider.    Staying Connected With Others  I will stay in touch with my friends, family members, and my primary care provider/team.    Use your imagination  Be creative.  We all have a creative side; it doesn t matter if it s oil painting, sand castles, or mud pies! This will also kick up the endorphins.    Witness Beauty  (AKA stop and smell the roses) Take a look outside, even in mid-winter. Notice colors, textures. Watch the squirrels and birds.     Service to others  Be of service to others.  There is always someone else in need.  By helping others we can  get out of ourselves  and remember the really important things.  This also provides opportunities for practicing all the other parts of the program.    Humor  Laugh and be silly!  Adjust your TV habits for less news and crime-drama and more comedy.    Control your stress  Try breathing deep, massage therapy, biofeedback, and meditation. Find time to relax each day.     My support system    Clinic Contact:  Phone number:    Contact 1:  Phone number:    Contact 2:  Phone number:    Mandaen/:  Phone number:    Therapist:  Phone number:    Local crisis center:    Phone number:    Other community support:  Phone number:

## 2018-10-04 NOTE — MR AVS SNAPSHOT
After Visit Summary   10/4/2018    Arielle Stallworth    MRN: [id number]           Patient Information     Date Of Birth          6/9/1931        Visit Information        Provider Department      10/4/2018 11:20 AM Barbara Spears PA-C [facility]        Today's Diagnoses     Current moderate episode of major depressive disorder without prior episode (H)    -  1    Hyponatremia          Care Instructions    Keep follow up with psychiatry  Follow up with Dr. Cohen in 1 month, sooner if needed  Continue current medications          Follow-ups after your visit        Follow-up notes from your care team     Return in about 4 weeks (around 11/1/2018).      Who to contact     Normal or non-critical lab and imaging results will be communicated to you by O[employer] Secure Wirelesshart, letter or phone within 4 business days after the clinic has received the results. If you do not hear from us within 7 days, please contact the clinic through [employer] Wirelesshart or phone. If you have a critical or abnormal lab result, we will notify you by phone as soon as possible.  Submit refill requests through Egg[employer]el or call your pharmacy and they will forward the refill request to us. Please allow 3 business days for your refill to be completed.          If you need to speak with a  for additional information , please call: [phone]             Additional Information About Your Visit        [employer] Information     E[employer] gives you secure access to your electronic health record. If you see a primary care provider, you can also send messages to your care team and make appointments. If you have questions, please call your primary care clinic.  If you do not have a primary care provider, please call [phone] and they will assist you.        Care EveryWhere ID     This is your Care EveryWhere ID. This could be used by other organizations to access your [place] medical records  [id number]        Your Vitals Were     Pulse  Temperature Pulse Oximetry BMI (Body Mass Index)          78 99.5  F (37.5  C) (Tympanic) 100% 23.15 kg/m2         Blood Pressure from Last 3 Encounters:   10/04/18 135/82   09/19/18 137/77   07/25/18 120/74    Weight from Last 3 Encounters:   10/04/18 139 lb 1.6 oz (63.1 kg)   09/19/18 142 lb 9.6 oz (64.7 kg)   07/25/18 145 lb (65.8 kg)              We Performed the Following     Sodium          Today's Medication Changes          These changes are accurate as of 10/4/18 12:10 PM.  If you have any questions, ask your nurse or doctor.               These medicines have changed or have updated prescriptions.        Dose/Directions    acetaminophen 650 MG 8 hour tablet   This may have changed:    - when to take this  - reasons to take this   Used for:  Left leg cellulitis        Dose:  650 mg   Take 650 mg by mouth every 4 hours as needed for mild pain   Quantity:  100 tablet   Refills:  0       mirtazapine 30 MG tablet   Commonly known as:  REMERON   This may have changed:  Another medication with the same name was removed. Continue taking this medication, and follow the directions you see here.   Changed by:  Barbara Spears PA-C        Dose:  30 mg   Take 30 mg by mouth   Refills:  0                Primary Care Provider Office Phone # Fax #    Constance Cohen [phone] [address] [facility] [address]        Equal Access to Services     [facility]: Hadii ofe dixon hadasho Sosulemaali, waaxda luqadaha, qaybta kaalmada adeegyada, vickie zavala . So [facility] [phone].    ATENCIÓN: Si habla español, tiene a earl disposición servicios gratuitos de asistencia lingüística. Llame al [phone].    We comply with applicable federal civil rights laws and [place] laws. We do not discriminate on the basis of race, color, national origin, age, disability, sex, sexual orientation, or gender identity.            Thank you!     Thank you for choosing [facility]   for your care. Our goal is always to provide you with excellent care. Hearing back from our patients is one way we can continue to improve our services. Please take a few minutes to complete the written survey that you may receive in the mail after your visit with us. Thank you!             Your Updated Medication List - Protect others around you: Learn how to safely use, store and throw away your medicines at www.disposemymeds.org.          This list is accurate as of 10/4/18 12:10 PM.  Always use your most recent med list.                   Brand Name Dispense Instructions for use Diagnosis    acetaminophen 650 MG 8 hour tablet     100 tablet    Take 650 mg by mouth every 4 hours as needed for mild pain    Left leg cellulitis       aspirin 81 MG EC tablet     90 tablet    Take 1 tablet (81 mg) by mouth daily    Acquired absence of knee joint       Doxycycline Hyclate 150 MG Tabs     60 tablet    Take 150 mg by mouth 2 times daily Resume once course of levaquin is completed    Left leg cellulitis       lisinopril 5 MG tablet    PRINIVIL/ZESTRIL    90 tablet    Take 1 tablet (5 mg) by mouth daily    Benign essential hypertension       LORazepam 0.5 MG tablet    ATIVAN    24 tablet    Take 1 tablet (0.5 mg) by mouth every 8 hours as needed for anxiety    DORON (generalized anxiety disorder)       LUMIGAN 0.01 % Soln   Generic drug:  bimatoprost      Place 1 drop into both eyes At Bedtime.        MELATONIN PO      Take 3 mg by mouth nightly as needed        mirtazapine 30 MG tablet    REMERON     Take 30 mg by mouth        MULTI-VITAMIN PO      Take 1 tablet by mouth daily.        nystatin-triamcinolone ointment    MYCOLOG    30 g    APPLY TO AFFECTED AREA TWO TIMES A DAY    Dermatitis       omeprazole 40 MG capsule    priLOSEC    90 capsule    Take 1 capsule (40 mg) by mouth daily Take 30-60 minutes before a meal.    Gastroesophageal reflux disease without esophagitis       penicillin V potassium 500 MG tablet     VEETID     Take 500 mg by mouth 2 times daily        sodium chloride 1 GM tablet     60 tablet    Take 1 tablet (1 g) by mouth 2 times daily    Hyponatremia       STOOL SOFTENER PO      Take 100 mg by mouth daily.        VITAMIN D-3 PO

## 2018-10-08 ENCOUNTER — RECORDS - HEALTHEAST (OUTPATIENT)
Dept: ADMINISTRATIVE | Facility: OTHER | Age: 83
End: 2018-10-08

## 2018-10-18 ENCOUNTER — OFFICE VISIT - HEALTHEAST (OUTPATIENT)
Dept: VASCULAR SURGERY | Facility: CLINIC | Age: 83
End: 2018-10-18

## 2018-10-18 ENCOUNTER — TRANSFERRED RECORDS (OUTPATIENT)
Dept: HEALTH INFORMATION MANAGEMENT | Facility: CLINIC | Age: 83
End: 2018-10-18

## 2018-10-18 DIAGNOSIS — L90.5 SCAR CONDITION AND FIBROSIS OF SKIN: ICD-10-CM

## 2018-10-18 DIAGNOSIS — R26.9 GAIT ABNORMALITY: ICD-10-CM

## 2018-10-18 DIAGNOSIS — M21.70 ACQUIRED LEG LENGTH DISCREPANCY: ICD-10-CM

## 2018-10-18 DIAGNOSIS — C55 UTERINE CANCER (H): ICD-10-CM

## 2018-10-18 DIAGNOSIS — I89.0 LYMPHEDEMA OF LEFT LOWER EXTREMITY: ICD-10-CM

## 2018-10-18 DIAGNOSIS — M79.89 LEFT LEG SWELLING: ICD-10-CM

## 2018-10-18 ASSESSMENT — MIFFLIN-ST. JEOR: SCORE: 1043.22

## 2018-10-31 ENCOUNTER — TRANSFERRED RECORDS (OUTPATIENT)
Dept: HEALTH INFORMATION MANAGEMENT | Facility: CLINIC | Age: 83
End: 2018-10-31

## 2018-11-13 DIAGNOSIS — E87.1 HYPONATREMIA: ICD-10-CM

## 2018-11-13 LAB — SODIUM SERPL-SCNC: 132 MMOL/L (ref 133–144)

## 2018-11-13 PROCEDURE — 36415 COLL VENOUS BLD VENIPUNCTURE: CPT | Performed by: FAMILY MEDICINE

## 2018-11-13 PROCEDURE — 84295 ASSAY OF SERUM SODIUM: CPT | Performed by: FAMILY MEDICINE

## 2018-11-15 ENCOUNTER — OFFICE VISIT (OUTPATIENT)
Dept: FAMILY MEDICINE | Facility: CLINIC | Age: 83
End: 2018-11-15
Payer: MEDICARE

## 2018-11-15 VITALS
HEART RATE: 88 BPM | TEMPERATURE: 98.1 F | BODY MASS INDEX: 22.33 KG/M2 | HEIGHT: 65 IN | SYSTOLIC BLOOD PRESSURE: 122 MMHG | DIASTOLIC BLOOD PRESSURE: 74 MMHG | WEIGHT: 134 LBS

## 2018-11-15 DIAGNOSIS — L20.84 INTRINSIC ECZEMA: ICD-10-CM

## 2018-11-15 DIAGNOSIS — E87.1 HYPONATREMIA: Primary | ICD-10-CM

## 2018-11-15 DIAGNOSIS — K21.9 GASTROESOPHAGEAL REFLUX DISEASE WITHOUT ESOPHAGITIS: ICD-10-CM

## 2018-11-15 PROCEDURE — 99214 OFFICE O/P EST MOD 30 MIN: CPT | Performed by: FAMILY MEDICINE

## 2018-11-15 RX ORDER — SODIUM CHLORIDE 1 G/1
TABLET ORAL
Qty: 90 TABLET | Refills: 1 | Status: SHIPPED | OUTPATIENT
Start: 2018-11-15

## 2018-11-15 NOTE — PROGRESS NOTES
SUBJECTIVE:                                                    Arielle Stallworth is a 87 year old female who presents to clinic today for the following health issues:    Discuss blood work.   Nystatin-triamcinolone  - is it okay to us on her knee  Discuss Omeprazole     Problem list and histories reviewed & adjusted, as indicated.  Additional history: would like to try taking less of the omeprazole. 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      HYSTERECTOMY       HYSTERECTOMY RADICAL      2002     JOINT REPLACEMENT      Left x2 and rt x1     ORTHOPEDIC SURGERY      Bilat. knees       Social History   Substance Use Topics     Smoking status: Former Smoker     Packs/day: 0.50     Years: 30.00     Types: Cigarettes     Quit date: 1/1/1960     Smokeless tobacco: Never Used     Alcohol use Yes       "Comment: Occasional     History reviewed. No pertinent family history.        ROS:  Constitutional, HEENT, cardiovascular, pulmonary, gi and gu systems are negative, except as otherwise noted.    OBJECTIVE:                                                    /74  Pulse 88  Temp 98.1  F (36.7  C) (Tympanic)  Ht 5' 5\" (1.651 m)  Wt 134 lb (60.8 kg)  BMI 22.3 kg/m2 Body mass index is 22.3 kg/(m^2).   GENERAL APPEARANCE: alert and no distress  RESP: lungs clear to auscultation - no rales, rhonchi or wheezes  CV: regular rates and rhythm, normal S1 S2, no S3 or S4 and no murmur, click or rub  PSYCH: mentation appears normal, affect flat and but she does smile more and did laugh   MENTAL STATUS EXAM:  Appearance/Behavior: No apparent distress, Neatly groomed and Dressed appropriately for weather  Speech: Normal  Mood/Affect: normal affect  Insight: Adequate       ASSESSMENT/PLAN:                                                    1. Hyponatremia  - Sodium; Standing  - sodium chloride 1 GM tablet; Take 2 g in am and 1 g in pm  Dispense: 90 tablet; Refill: 1    2. Gastroesophageal reflux disease without esophagitis    - omeprazole (PRILOSEC) 20 MG CR capsule; Take 1 capsule (20 mg) by mouth daily  Dispense: 90 capsule; Refill: 3    3. Intrinsic eczema        Patient Instructions   Decrease the sodium starting 11/25 to 3 tabs per day and get the sodium lab drawn on Friday   Decrease the omeprazole to 20 mg per day   If you have more pain increase to the 40 mg for 1-2 weeks then try decreasing again       Use the nystatin for the next few days if it is not improving , please stop the ointment              reports that she quit smoking about 58 years ago. Her smoking use included Cigarettes. She has a 15.00 pack-year smoking history. She has never used smokeless tobacco.          Constance Cohen M.D.  [facility]    "

## 2018-11-15 NOTE — PATIENT INSTRUCTIONS
Decrease the sodium starting 11/25 to 3 tabs per day and get the sodium lab drawn on Friday   Decrease the omeprazole to 20 mg per day   If you have more pain increase to the 40 mg for 1-2 weeks then try decreasing again       Use the nystatin for the next few days if it is not improving , please stop the ointment

## 2018-11-15 NOTE — LETTER
[facility]  [address] [address]  [phone]        January 3, 2020    Arielle Stallworth  [address]    [place] [address]              Dear Arielle Stallworth    This is to remind you that your lab is due.    You may call our office at [phone] to schedule an appointment.    Please disregard this notice if you have already had your labs drawn or made an appointment.        Sincerely,        Constance Cohen MD

## 2018-11-15 NOTE — MR AVS SNAPSHOT
After Visit Summary   11/15/2018    Arielle Stallworth    MRN: [id number]           Patient Information     Date Of Birth          6/9/1931        Visit Information        Provider Department      11/15/2018 11:30 AM Constance Cohen MD [facility]        Today's Diagnoses     Hyponatremia    -  1    Gastroesophageal reflux disease without esophagitis          Care Instructions    Decrease the sodium starting 11/25 to 3 tabs per day and get the sodium lab drawn on Friday   Decrease the omeprazole to 20 mg per day   If you have more pain increase to the 40 mg for 1-2 weeks then try decreasing again       Use the nystatin for the next few days if it is not improving , please stop the ointment             Follow-ups after your visit        Future tests that were ordered for you today     Open Standing Orders        Priority Remaining Interval Expires Ordered    Sodium Routine 12/12  11/15/2019 11/15/2018            Who to contact     Normal or non-critical lab and imaging results will be communicated to you by writewithhart, letter or phone within 4 business days after the clinic has received the results. If you do not hear from us within 7 days, please contact the clinic through writewithhart or phone. If you have a critical or abnormal lab result, we will notify you by phone as soon as possible.  Submit refill requests through [email or web address] or call your pharmacy and they will forward the refill request to us. Please allow 3 business days for your refill to be completed.          If you need to speak with a  for additional information , please call: [phone]             Additional Information About Your Visit        [email or web address] Information     [email or web address] gives you secure access to your electronic health record. If you see a primary care provider, you can also send messages to your care team and make appointments. If you have questions, please call your primary care clinic.  If you do not have a  "primary care provider, please call [phone] and they will assist you.        Care EveryWhere ID     This is your Care EveryWhere ID. This could be used by other organizations to access your [place] medical records  [id number]        Your Vitals Were     Pulse Temperature Height BMI (Body Mass Index)          88 98.1  F (36.7  C) (Tympanic) 5' 5\" (1.651 m) 22.3 kg/m2         Blood Pressure from Last 3 Encounters:   11/15/18 122/74   10/04/18 135/82   09/19/18 137/77    Weight from Last 3 Encounters:   11/15/18 134 lb (60.8 kg)   10/04/18 139 lb 1.6 oz (63.1 kg)   09/19/18 142 lb 9.6 oz (64.7 kg)                 Today's Medication Changes          These changes are accurate as of 11/15/18 12:09 PM.  If you have any questions, ask your nurse or doctor.               These medicines have changed or have updated prescriptions.        Dose/Directions    acetaminophen 650 MG 8 hour tablet   This may have changed:    - when to take this  - reasons to take this   Used for:  Left leg cellulitis        Dose:  650 mg   Take 650 mg by mouth every 4 hours as needed for mild pain   Quantity:  100 tablet   Refills:  0       * omeprazole 40 MG capsule   Commonly known as:  priLOSEC   This may have changed:  Another medication with the same name was added. Make sure you understand how and when to take each.   Used for:  Gastroesophageal reflux disease without esophagitis   Changed by:  Constance Cohen MD        Dose:  40 mg   Take 1 capsule (40 mg) by mouth daily Take 30-60 minutes before a meal.   Quantity:  90 capsule   Refills:  3       * omeprazole 20 MG CR capsule   Commonly known as:  priLOSEC   This may have changed:  You were already taking a medication with the same name, and this prescription was added. Make sure you understand how and when to take each.   Used for:  Gastroesophageal reflux disease without esophagitis   Changed by:  Constance Cohen MD        Dose:  20 mg   Take 1 capsule (20 mg) by mouth daily "   Quantity:  90 capsule   Refills:  3       * Notice:  This list has 2 medication(s) that are the same as other medications prescribed for you. Read the directions carefully, and ask your doctor or other care provider to review them with you.         Where to get your medicines      These medications were sent to [facility], [place] - [address]  [address] [facility] [address]     Phone:  [phone]     omeprazole 20 MG CR capsule                Primary Care Provider Office Phone # Fax #    Constance Cohen [phone]       [address] [facility] [address]        Equal Access to Services     [facility]: Hadii aad ku hadasho Soomaali, waaxda luqadaha, qaybta kaalmada adeegyada, waxshawna zavala . So [facility] [place] [phone].    ATENCIÓN: Si habla español, tiene a earl disposición servicios gratuitos de asistencia lingüística. [facility] [phone].    We comply with applicable federal civil rights laws and [place] laws. We do not discriminate on the basis of race, color, national origin, age, disability, sex, sexual orientation, or gender identity.            Thank you!     Thank you for choosing [facility]  for your care. Our goal is always to provide you with excellent care. Hearing back from our patients is one way we can continue to improve our services. Please take a few minutes to complete the written survey that you may receive in the mail after your visit with us. Thank you!             Your Updated Medication List - Protect others around you: Learn how to safely use, store and throw away your medicines at www.disposemymeds.org.          This list is accurate as of 11/15/18 12:09 PM.  Always use your most recent med list.                   Brand Name Dispense Instructions for use Diagnosis    acetaminophen 650 MG 8 hour tablet     100 tablet    Take 650 mg by mouth every 4 hours as needed for mild pain    Left leg  cellulitis       aspirin 81 MG EC tablet     90 tablet    Take 1 tablet (81 mg) by mouth daily    Acquired absence of knee joint       Doxycycline Hyclate 150 MG Tabs     60 tablet    Take 150 mg by mouth 2 times daily Resume once course of levaquin is completed    Left leg cellulitis       lisinopril 5 MG tablet    PRINIVIL/ZESTRIL    90 tablet    Take 1 tablet (5 mg) by mouth daily    Benign essential hypertension       LORazepam 0.5 MG tablet    ATIVAN    24 tablet    Take 1 tablet (0.5 mg) by mouth every 8 hours as needed for anxiety    DORON (generalized anxiety disorder)       LUMIGAN 0.01 % Soln   Generic drug:  bimatoprost      Place 1 drop into both eyes At Bedtime.        MELATONIN PO      Take 3 mg by mouth nightly as needed        mirtazapine 30 MG tablet    REMERON     Take 30 mg by mouth        MULTI-VITAMIN PO      Take 1 tablet by mouth daily.        nystatin-triamcinolone ointment    MYCOLOG    30 g    APPLY TO AFFECTED AREA TWO TIMES A DAY    Dermatitis       * omeprazole 40 MG capsule    priLOSEC    90 capsule    Take 1 capsule (40 mg) by mouth daily Take 30-60 minutes before a meal.    Gastroesophageal reflux disease without esophagitis       * omeprazole 20 MG CR capsule    priLOSEC    90 capsule    Take 1 capsule (20 mg) by mouth daily    Gastroesophageal reflux disease without esophagitis       penicillin V potassium 500 MG tablet    VEETID     Take 500 mg by mouth 2 times daily        sodium chloride 1 GM tablet     60 tablet    Take 1 tablet (1 g) by mouth 2 times daily    Hyponatremia       STOOL SOFTENER PO      Take 100 mg by mouth daily.        VITAMIN D-3 PO           * Notice:  This list has 2 medication(s) that are the same as other medications prescribed for you. Read the directions carefully, and ask your doctor or other care provider to review them with you.

## 2018-11-21 ENCOUNTER — AMBULATORY - HEALTHEAST (OUTPATIENT)
Dept: CARDIOLOGY | Facility: CLINIC | Age: 83
End: 2018-11-21

## 2018-11-21 DIAGNOSIS — Z95.0 CARDIAC PACEMAKER IN SITU: ICD-10-CM

## 2018-11-21 ASSESSMENT — MIFFLIN-ST. JEOR: SCORE: 1022.36

## 2018-12-13 DIAGNOSIS — E87.1 HYPONATREMIA: ICD-10-CM

## 2018-12-13 LAB — SODIUM SERPL-SCNC: 128 MMOL/L (ref 133–144)

## 2018-12-13 PROCEDURE — 84295 ASSAY OF SERUM SODIUM: CPT | Performed by: FAMILY MEDICINE

## 2018-12-13 PROCEDURE — 36415 COLL VENOUS BLD VENIPUNCTURE: CPT | Performed by: FAMILY MEDICINE

## 2019-01-09 DIAGNOSIS — E87.1 HYPONATREMIA: ICD-10-CM

## 2019-01-09 LAB — SODIUM SERPL-SCNC: 129 MMOL/L (ref 133–144)

## 2019-01-09 PROCEDURE — 36415 COLL VENOUS BLD VENIPUNCTURE: CPT | Performed by: FAMILY MEDICINE

## 2019-01-09 PROCEDURE — 84295 ASSAY OF SERUM SODIUM: CPT | Performed by: FAMILY MEDICINE

## 2019-01-16 ENCOUNTER — OFFICE VISIT (OUTPATIENT)
Dept: FAMILY MEDICINE | Facility: CLINIC | Age: 84
End: 2019-01-16
Payer: MEDICARE

## 2019-01-16 VITALS
BODY MASS INDEX: 22.49 KG/M2 | DIASTOLIC BLOOD PRESSURE: 82 MMHG | SYSTOLIC BLOOD PRESSURE: 136 MMHG | WEIGHT: 135 LBS | HEART RATE: 72 BPM | TEMPERATURE: 97.7 F | HEIGHT: 65 IN

## 2019-01-16 DIAGNOSIS — E87.1 HYPONATREMIA: ICD-10-CM

## 2019-01-16 DIAGNOSIS — T84.093S FAILED TOTAL LEFT KNEE REPLACEMENT, SEQUELA: ICD-10-CM

## 2019-01-16 DIAGNOSIS — L21.9 SEBORRHEIC DERMATITIS: Primary | ICD-10-CM

## 2019-01-16 DIAGNOSIS — F33.2 SEVERE EPISODE OF RECURRENT MAJOR DEPRESSIVE DISORDER, WITHOUT PSYCHOTIC FEATURES (H): ICD-10-CM

## 2019-01-16 PROCEDURE — 99214 OFFICE O/P EST MOD 30 MIN: CPT | Performed by: FAMILY MEDICINE

## 2019-01-16 RX ORDER — TRIAMCINOLONE ACETONIDE 1 MG/ML
LOTION TOPICAL 3 TIMES DAILY
Qty: 60 ML | Refills: 3 | Status: SHIPPED | OUTPATIENT
Start: 2019-01-16 | End: 2019-10-30

## 2019-01-16 ASSESSMENT — MIFFLIN-ST. JEOR: SCORE: 1048.24

## 2019-01-16 NOTE — PROGRESS NOTES
SUBJECTIVE:                                                    Arielle Stallworth is a 87 year old female who presents to clinic today for the following health issues:    Red scaly spots on the top of the forehead and hairline.   Discuss sodium  Sores in mouth for about 1.5 months.   Discuss fluid intake   Drooling at night   Discuss new medication given but has not started, Methylphenidate.   Marilyn Edwards CMA    Wt Readings from Last 4 Encounters:   01/16/19 61.2 kg (135 lb)   11/15/18 60.8 kg (134 lb)   10/04/18 63.1 kg (139 lb 1.6 oz)   09/19/18 64.7 kg (142 lb 9.6 oz)     She is afraid to take the methylphenidate prescribed by her psychiatrist we discussed the benefits of taking this along with the risks . I think she will benefit from it \  Her sodium is staying about the same   She does have some sores in her mouth but not right now  She also has some drooling at night she has deep folds at the corners of her mouth and this causes her to have some moisture buildup  She has not really tried anything for this. When she smiles they are gone   Has a rash at the edges of her hairline a little red and more flaky     Problem list and histories reviewed & adjusted, as indicated.  Additional history: as above has the     Patient Active Problem List   Diagnosis     Cardiac pacemaker in situ     Advanced directives, counseling/discussion     Hyponatremia     Intertrigo     Depression, major     Arthritis     Cellulitis of left leg     Sepsis (H)     [facility]     Anxiety     Anemia     Infection and inflammatory reaction due to internal prosthetic device, implant, and graft     Lymphedema of left lower extremity     Hyponatremia     Acquired leg length discrepancy     Uterine cancer (H)     Lymphedema     Acute encephalopathy     Severe episode of recurrent major depressive disorder, without psychotic features (H)     Past Surgical History:   Procedure Laterality Date     CARPAL TUNNEL RELEASE RT/LT Left 3/2015     "and excision mucous cyst lt thumb     CYSTOCELE REPAIR       HERNIA REPAIR      Twice     HERNIA REPAIR  3/2010    Reduction of a strangulated incisional inguinal hernia with repair of multiple incisional hernias with mesh.      HYSTERECTOMY       HYSTERECTOMY RADICAL           JOINT REPLACEMENT      Left x2 and rt x1     ORTHOPEDIC SURGERY      Bilat. knees       Social History     Tobacco Use     Smoking status: Former Smoker     Packs/day: 0.50     Years: 30.00     Pack years: 15.00     Types: Cigarettes     Last attempt to quit: 1960     Years since quittin.0     Smokeless tobacco: Never Used   Substance Use Topics     Alcohol use: Yes     Comment: Occasional     History reviewed. No pertinent family history.        ROS:  Constitutional, HEENT, cardiovascular, pulmonary, gi and gu systems are negative, except as otherwise noted.    OBJECTIVE:                                                    /82   Pulse 72   Temp 97.7  F (36.5  C) (Tympanic)   Ht 1.651 m (5' 5\")   Wt 61.2 kg (135 lb)   BMI 22.47 kg/m   Body mass index is 22.47 kg/m .   GENERAL APPEARANCE: healthy, alert and no distress  NECK: no adenopathy, no asymmetry, masses, or scars and thyroid normal to palpation  RESP: lungs clear to auscultation - no rales, rhonchi or wheezes  CV: regular rates and rhythm, normal S1 S2, no S3 or S4 and no murmur, click or rub  PSYCH: mentation appears normal and anxious  MENTAL STATUS EXAM:  Appearance/Behavior: in wheelchair , No apparent distress, Neatly groomed and Dressed appropriately for weather  Speech: Normal  Mood/Affect: anxiety  Insight: Adequate  Skin  Slight flaking at the edges of the hair line minimally red      ASSESSMENT/PLAN:                                                      1. Severe episode of recurrent major depressive disorder, without psychotic features (H)  She will try the methylphenidate     2. Seborrheic dermatitis  Apply for up to 2 weeks   - triamcinolone (KENALOG) " 0.1 % external lotion; Apply topically 3 times daily  Dispense: 60 mL; Refill: 3    3. Hyponatremia  Stable at present with 2 g in am and 1 in pm     4. Failed total left knee replacement, sequela  Unable to order the cream that she used as we cannot find the percentage of each component     reports that she quit smoking about 59 years ago. Her smoking use included cigarettes. She has a 15.00 pack-year smoking history. she has never used smokeless tobacco.          Constance Cohen M.D.  [facility]

## 2019-01-28 ENCOUNTER — TELEPHONE (OUTPATIENT)
Dept: FAMILY MEDICINE | Facility: CLINIC | Age: 84
End: 2019-01-28

## 2019-01-28 DIAGNOSIS — K12.30 STOMATITIS AND MUCOSITIS: Primary | ICD-10-CM

## 2019-01-28 DIAGNOSIS — K12.1 STOMATITIS AND MUCOSITIS: Primary | ICD-10-CM

## 2019-01-28 NOTE — TELEPHONE ENCOUNTER
"\"Dr. Cohen and I have talked about a medication for my mouth.\" Arielle  reports that there are sores in her mouth. She says that they are on her tongue and the inside of her cheeks. Denies white patches. \"It is so sore that I can hardly eat.\"  She is requesting that I send this request to Dr. Cohen only for a medication that she has had before. \"It was called something like Oral magic mouthwash; included lidocaine.\" I offered to send her request to another Provider here today but she wanted me to route this to Dr. Cohen only.  Bryant Raman, RN    "

## 2019-01-31 ENCOUNTER — TELEPHONE (OUTPATIENT)
Dept: FAMILY MEDICINE | Facility: CLINIC | Age: 84
End: 2019-01-31

## 2019-01-31 NOTE — TELEPHONE ENCOUNTER
Reason for Call:  Other prescription    Detailed comments:  [facility] pharmacy calling to see if Magic Mouthwash is suppose to be a swish and swallow or swish and spit.  Please review and advise. Thank you..Lucia Edwards    Phone Number Patient can be reached at: Other phone number:  [phone]*    Best Time: any time    Can we leave a detailed message on this number? YES    Call taken on 1/31/2019 at 4:06 PM by Lucia Edwards

## 2019-02-05 ENCOUNTER — TELEPHONE (OUTPATIENT)
Dept: FAMILY MEDICINE | Facility: CLINIC | Age: 84
End: 2019-02-05

## 2019-02-05 NOTE — TELEPHONE ENCOUNTER
Call placed to patient.  Reports she quit using Magic Mouth Wash on 2/3/19, Sunday as it increased her mouth pain.  The initial area of treatment was inner lower lip.  With use of Magic Mouth Wash, patient felt excoriation was extending.  Since discontinuing use, pain has improved and area of sores is inner lower lip only.    Patient requesting this writer update Dr Cohen.   Patient states pain is tolerable, wants to see a dentist to determine if they have recommendations for mouth pain.    Reviewed note from 1/28/19 when order was initiated.   Patient reported area affected was tongue and cheeks.  According to this, appears improvement.    Patient declined need for intervention, offered to relay message to provider in clinic today, patient declined.  Agrees to contact care team if symptoms worsen.  Shy Jacinto RN

## 2019-02-05 NOTE — TELEPHONE ENCOUNTER
Reason for Call:  Other prescription    Detailed comments: Arielle LEFT MESSAGE:  She is asking about magic mouthwash.  Asking someone to return her call.  Please call and assess. Thank you..Lucia Edwards    Phone Number Patient can be reached at: Home number on file [phone] (home)        Call taken on 2/5/2019 at 1:19 PM by Lucia Edwards

## 2019-02-05 NOTE — TELEPHONE ENCOUNTER
She should see the dentist she will likely get some relief with their advice. Constance Cohen M.D.'

## 2019-02-06 NOTE — TELEPHONE ENCOUNTER
Patient notified of recommendations from Dr. Cohen  Patient verbalized understanding and agreed with provider's plan.    Stephanie MERCADO Rn

## 2019-02-14 ENCOUNTER — AMBULATORY - HEALTHEAST (OUTPATIENT)
Dept: CARDIOLOGY | Facility: CLINIC | Age: 84
End: 2019-02-14

## 2019-02-14 DIAGNOSIS — Z95.0 CARDIAC PACEMAKER IN SITU: ICD-10-CM

## 2019-02-15 DIAGNOSIS — E87.1 HYPONATREMIA: ICD-10-CM

## 2019-02-15 LAB — SODIUM SERPL-SCNC: 124 MMOL/L (ref 133–144)

## 2019-02-15 PROCEDURE — 36415 COLL VENOUS BLD VENIPUNCTURE: CPT | Performed by: FAMILY MEDICINE

## 2019-02-15 PROCEDURE — 84295 ASSAY OF SERUM SODIUM: CPT | Performed by: FAMILY MEDICINE

## 2019-02-19 ENCOUNTER — TELEPHONE (OUTPATIENT)
Dept: FAMILY MEDICINE | Facility: CLINIC | Age: 84
End: 2019-02-19

## 2019-02-19 DIAGNOSIS — E87.1 HYPONATREMIA: Primary | ICD-10-CM

## 2019-02-19 NOTE — TELEPHONE ENCOUNTER
I do not know this patient.   Her sodium came back low and it appears that it has been trending down for awhile. Dr Cohen has her on oral sodium replacement but it is lower now than it was.     I have looked back to mid summer 2018 and it appears that the sodium was considered low due to psych meds.     Can you call her and confirm? See if she knows why the sodium was low?   Also, has she been taking her sodium replacement?     Thanks, KAMAR Barkley MD

## 2019-02-19 NOTE — TELEPHONE ENCOUNTER
1. Arielle reports that she has been taking 2 gm of sodium every morning and one gram every evening. But since she saw the result on Mychart on 2/16/19; has been taking 2 grams every morning and 2 grams every evening.   2. Her daughter, Frances,  said that in the past,  the effexor was causing her sodium levels to drop.   3. Arielle said that she is supposed to be on fluid restrictions but she cannot remember what the restriction is. Frances says that she drinks a total of one liter daily  ---coffee, milk and water total. Frances says that the hospital would have her on fluid restrictions but that Dr. Cohen did not.   Bryant Raman, RN

## 2019-02-19 NOTE — TELEPHONE ENCOUNTER
"1. Patient notified of all of Dr. Barkley's instructions as noted below. Arielle agreed with plans. She said she would have her daughter, Frances, make a lab only appointment.     2. She said her Psychiatrist is \"Dr[facility] Beth Gross at [facility].\"  She said that she would notify Dr. Gross about her low sodium.    3. Arielle said that she has been taking omeprazole for about one year. She said that she read on the information that one should only take it for a few months. Arielle wants to know if she needs lab work for this? Or should I stop it? Or should I be taking something different?  Thank you.  Bryant Raman RN    "

## 2019-02-19 NOTE — TELEPHONE ENCOUNTER
I did see in one of the notes that they mentioned 1L fluid restriction.     I would like to recheck her sodium since she has been taking 2gm bid.     I will put in an order for her    Sounds like she has a psychiatrist?  Does he know about the recent sodium level? If not, they should let him know.     KAMAR Barkley MD

## 2019-03-01 DIAGNOSIS — E87.1 HYPONATREMIA: ICD-10-CM

## 2019-03-01 LAB — SODIUM SERPL-SCNC: 130 MMOL/L (ref 133–144)

## 2019-03-01 PROCEDURE — 84295 ASSAY OF SERUM SODIUM: CPT | Performed by: FAMILY MEDICINE

## 2019-03-01 PROCEDURE — 36415 COLL VENOUS BLD VENIPUNCTURE: CPT | Performed by: FAMILY MEDICINE

## 2019-03-18 DIAGNOSIS — F33.1 MODERATE EPISODE OF RECURRENT MAJOR DEPRESSIVE DISORDER (H): Primary | ICD-10-CM

## 2019-03-18 DIAGNOSIS — D50.8 OTHER IRON DEFICIENCY ANEMIA: ICD-10-CM

## 2019-03-22 DIAGNOSIS — E87.1 HYPONATREMIA: Primary | ICD-10-CM

## 2019-03-25 DIAGNOSIS — I10 BENIGN ESSENTIAL HYPERTENSION: ICD-10-CM

## 2019-03-25 NOTE — TELEPHONE ENCOUNTER
"LISINOPRIL 5MG TABS      Last Written Prescription Date:  10/4/18  Last Fill Quantity: 90,   # refills: 1  Last Office Visit: 1/16/19  Future Office visit:       Requested Prescriptions   Pending Prescriptions Disp Refills     lisinopril (PRINIVIL/ZESTRIL) 5 MG tablet [Pharmacy Med Name: LISINOPRIL 5MG TABS] 90 tablet 1     Sig: TAKE ONE TABLET BY MOUTH EVERY DAY    ACE Inhibitors (Including Combos) Protocol Passed - 3/25/2019 11:47 AM       Passed - Blood pressure under 140/90 in past 12 months    BP Readings from Last 3 Encounters:   01/16/19 136/82   11/15/18 122/74   10/04/18 135/82                Passed - Recent (12 mo) or future (30 days) visit within the authorizing provider's specialty    Patient had office visit in the last 12 months or has a visit in the next 30 days with authorizing provider or within the authorizing provider's specialty.  See \"Patient Info\" tab in inbasket, or \"Choose Columns\" in Meds & Orders section of the refill encounter.             Passed - Medication is active on med list       Passed - Patient is age 18 or older       Passed - No active pregnancy on record       Passed - Normal serum creatinine on file in past 12 months    Recent Labs   Lab Test 09/22/18   CR 0.61            Passed - Normal serum potassium on file in past 12 months    Recent Labs   Lab Test 09/22/18   POTASSIUM 4.6            Passed - No positive pregnancy test within past 12 months          "

## 2019-03-26 RX ORDER — LISINOPRIL 5 MG/1
TABLET ORAL
Qty: 90 TABLET | Refills: 1 | Status: SHIPPED | OUTPATIENT
Start: 2019-03-26 | End: 2019-09-22

## 2019-03-26 NOTE — TELEPHONE ENCOUNTER
Prescription approved per [facility] Refill Protocol.  Bryant Raman RN

## 2019-03-27 DIAGNOSIS — D50.8 OTHER IRON DEFICIENCY ANEMIA: ICD-10-CM

## 2019-03-27 DIAGNOSIS — F33.1 MODERATE EPISODE OF RECURRENT MAJOR DEPRESSIVE DISORDER (H): ICD-10-CM

## 2019-03-27 LAB
ANION GAP SERPL CALCULATED.3IONS-SCNC: 5 MMOL/L (ref 3–14)
BUN SERPL-MCNC: 20 MG/DL (ref 7–30)
CALCIUM SERPL-MCNC: 8.9 MG/DL (ref 8.5–10.1)
CHLORIDE SERPL-SCNC: 97 MMOL/L (ref 94–109)
CO2 SERPL-SCNC: 29 MMOL/L (ref 20–32)
CREAT SERPL-MCNC: 0.53 MG/DL (ref 0.52–1.04)
GFR SERPL CREATININE-BSD FRML MDRD: 85 ML/MIN/{1.73_M2}
GLUCOSE SERPL-MCNC: 90 MG/DL (ref 70–99)
IRON SATN MFR SERPL: 41 % (ref 15–46)
IRON SERPL-MCNC: 82 UG/DL (ref 35–180)
MAGNESIUM SERPL-MCNC: 2 MG/DL (ref 1.6–2.3)
POTASSIUM SERPL-SCNC: 4.5 MMOL/L (ref 3.4–5.3)
SODIUM SERPL-SCNC: 131 MMOL/L (ref 133–144)
TIBC SERPL-MCNC: 202 UG/DL (ref 240–430)
VIT B12 SERPL-MCNC: 537 PG/ML (ref 193–986)

## 2019-03-27 PROCEDURE — 80048 BASIC METABOLIC PNL TOTAL CA: CPT | Performed by: PSYCHIATRY & NEUROLOGY

## 2019-03-27 PROCEDURE — 83540 ASSAY OF IRON: CPT | Performed by: PSYCHIATRY & NEUROLOGY

## 2019-03-27 PROCEDURE — 82607 VITAMIN B-12: CPT | Performed by: PSYCHIATRY & NEUROLOGY

## 2019-03-27 PROCEDURE — 83735 ASSAY OF MAGNESIUM: CPT | Performed by: PSYCHIATRY & NEUROLOGY

## 2019-03-27 PROCEDURE — 83550 IRON BINDING TEST: CPT | Performed by: PSYCHIATRY & NEUROLOGY

## 2019-03-27 PROCEDURE — 36415 COLL VENOUS BLD VENIPUNCTURE: CPT | Performed by: PSYCHIATRY & NEUROLOGY

## 2019-04-22 ENCOUNTER — TRANSFERRED RECORDS (OUTPATIENT)
Dept: HEALTH INFORMATION MANAGEMENT | Facility: CLINIC | Age: 84
End: 2019-04-22

## 2019-04-22 ENCOUNTER — OFFICE VISIT - HEALTHEAST (OUTPATIENT)
Dept: VASCULAR SURGERY | Facility: CLINIC | Age: 84
End: 2019-04-22

## 2019-04-22 DIAGNOSIS — C55 MALIGNANT NEOPLASM OF UTERUS, UNSPECIFIED SITE (H): ICD-10-CM

## 2019-04-22 DIAGNOSIS — L90.5 SCAR CONDITION AND FIBROSIS OF SKIN: ICD-10-CM

## 2019-04-22 DIAGNOSIS — M79.89 LEFT LEG SWELLING: ICD-10-CM

## 2019-04-22 DIAGNOSIS — R26.9 GAIT ABNORMALITY: ICD-10-CM

## 2019-04-22 DIAGNOSIS — M25.362 UNSTABLE KNEE, LEFT: ICD-10-CM

## 2019-04-22 DIAGNOSIS — I89.0 LYMPHEDEMA OF LEFT LOWER EXTREMITY: ICD-10-CM

## 2019-04-22 DIAGNOSIS — M21.70 ACQUIRED LEG LENGTH DISCREPANCY: ICD-10-CM

## 2019-04-22 ASSESSMENT — MIFFLIN-ST. JEOR: SCORE: 993.78

## 2019-04-29 ENCOUNTER — COMMUNICATION - HEALTHEAST (OUTPATIENT)
Dept: OTHER | Facility: CLINIC | Age: 84
End: 2019-04-29

## 2019-05-16 ENCOUNTER — AMBULATORY - HEALTHEAST (OUTPATIENT)
Dept: CARDIOLOGY | Facility: CLINIC | Age: 84
End: 2019-05-16

## 2019-05-16 DIAGNOSIS — Z95.0 CARDIAC PACEMAKER IN SITU: ICD-10-CM

## 2019-06-03 ENCOUNTER — AMBULATORY - HEALTHEAST (OUTPATIENT)
Dept: VASCULAR SURGERY | Facility: CLINIC | Age: 84
End: 2019-06-03

## 2019-06-03 DIAGNOSIS — C55 MALIGNANT NEOPLASM OF UTERUS, UNSPECIFIED SITE (H): ICD-10-CM

## 2019-06-03 DIAGNOSIS — L90.5 SCAR CONDITION AND FIBROSIS OF SKIN: ICD-10-CM

## 2019-06-03 DIAGNOSIS — I89.0 LYMPHEDEMA OF LEFT LOWER EXTREMITY: ICD-10-CM

## 2019-06-17 ENCOUNTER — RECORDS - HEALTHEAST (OUTPATIENT)
Dept: ADMINISTRATIVE | Facility: OTHER | Age: 84
End: 2019-06-17

## 2019-06-17 ENCOUNTER — COMMUNICATION - HEALTHEAST (OUTPATIENT)
Dept: VASCULAR SURGERY | Facility: CLINIC | Age: 84
End: 2019-06-17

## 2019-06-17 DIAGNOSIS — E55.9 VITAMIN D DEFICIENCY: ICD-10-CM

## 2019-06-17 DIAGNOSIS — D63.8 ANEMIA IN OTHER CHRONIC DISEASES CLASSIFIED ELSEWHERE: ICD-10-CM

## 2019-06-17 DIAGNOSIS — F33.1 MAJOR DEPRESSIVE DISORDER, RECURRENT EPISODE, MODERATE (H): Primary | ICD-10-CM

## 2019-06-17 LAB
ANION GAP SERPL CALCULATED.3IONS-SCNC: 6 MMOL/L (ref 3–14)
BASOPHILS # BLD AUTO: 0 10E9/L (ref 0–0.2)
BASOPHILS NFR BLD AUTO: 0.5 %
BUN SERPL-MCNC: 21 MG/DL (ref 7–30)
CALCIUM SERPL-MCNC: 8.7 MG/DL (ref 8.5–10.1)
CHLORIDE SERPL-SCNC: 101 MMOL/L (ref 94–109)
CO2 SERPL-SCNC: 28 MMOL/L (ref 20–32)
CREAT SERPL-MCNC: 0.55 MG/DL (ref 0.52–1.04)
DIFFERENTIAL METHOD BLD: NORMAL
EOSINOPHIL # BLD AUTO: 0.4 10E9/L (ref 0–0.7)
EOSINOPHIL NFR BLD AUTO: 6.2 %
ERYTHROCYTE [DISTWIDTH] IN BLOOD BY AUTOMATED COUNT: 12.4 % (ref 10–15)
GFR SERPL CREATININE-BSD FRML MDRD: 84 ML/MIN/{1.73_M2}
GLUCOSE SERPL-MCNC: 114 MG/DL (ref 70–99)
HCT VFR BLD AUTO: 36 % (ref 35–47)
HGB BLD-MCNC: 11.8 G/DL (ref 11.7–15.7)
IRON SATN MFR SERPL: 27 % (ref 15–46)
IRON SERPL-MCNC: 59 UG/DL (ref 35–180)
LYMPHOCYTES # BLD AUTO: 1.2 10E9/L (ref 0.8–5.3)
LYMPHOCYTES NFR BLD AUTO: 20.4 %
MAGNESIUM SERPL-MCNC: 2.3 MG/DL (ref 1.6–2.3)
MCH RBC QN AUTO: 31 PG (ref 26.5–33)
MCHC RBC AUTO-ENTMCNC: 32.8 G/DL (ref 31.5–36.5)
MCV RBC AUTO: 95 FL (ref 78–100)
MONOCYTES # BLD AUTO: 0.5 10E9/L (ref 0–1.3)
MONOCYTES NFR BLD AUTO: 9.4 %
NEUTROPHILS # BLD AUTO: 3.6 10E9/L (ref 1.6–8.3)
NEUTROPHILS NFR BLD AUTO: 63.5 %
PLATELET # BLD AUTO: 254 10E9/L (ref 150–450)
POTASSIUM SERPL-SCNC: 4.6 MMOL/L (ref 3.4–5.3)
RBC # BLD AUTO: 3.81 10E12/L (ref 3.8–5.2)
SODIUM SERPL-SCNC: 135 MMOL/L (ref 133–144)
TIBC SERPL-MCNC: 215 UG/DL (ref 240–430)
VIT B12 SERPL-MCNC: 646 PG/ML (ref 193–986)
WBC # BLD AUTO: 5.6 10E9/L (ref 4–11)

## 2019-06-17 PROCEDURE — 83735 ASSAY OF MAGNESIUM: CPT | Performed by: PSYCHIATRY & NEUROLOGY

## 2019-06-17 PROCEDURE — 82306 VITAMIN D 25 HYDROXY: CPT | Performed by: PSYCHIATRY & NEUROLOGY

## 2019-06-17 PROCEDURE — 82607 VITAMIN B-12: CPT | Performed by: PSYCHIATRY & NEUROLOGY

## 2019-06-17 PROCEDURE — 83540 ASSAY OF IRON: CPT | Performed by: PSYCHIATRY & NEUROLOGY

## 2019-06-17 PROCEDURE — 36415 COLL VENOUS BLD VENIPUNCTURE: CPT | Performed by: PSYCHIATRY & NEUROLOGY

## 2019-06-17 PROCEDURE — 85025 COMPLETE CBC W/AUTO DIFF WBC: CPT | Performed by: PSYCHIATRY & NEUROLOGY

## 2019-06-17 PROCEDURE — 80048 BASIC METABOLIC PNL TOTAL CA: CPT | Performed by: PSYCHIATRY & NEUROLOGY

## 2019-06-17 PROCEDURE — 83550 IRON BINDING TEST: CPT | Performed by: PSYCHIATRY & NEUROLOGY

## 2019-06-18 LAB — DEPRECATED CALCIDIOL+CALCIFEROL SERPL-MC: 67 UG/L (ref 20–75)

## 2019-07-29 ENCOUNTER — RECORDS - HEALTHEAST (OUTPATIENT)
Dept: ADMINISTRATIVE | Facility: OTHER | Age: 84
End: 2019-07-29

## 2019-08-22 ENCOUNTER — AMBULATORY - HEALTHEAST (OUTPATIENT)
Dept: CARDIOLOGY | Facility: CLINIC | Age: 84
End: 2019-08-22

## 2019-08-22 DIAGNOSIS — Z95.0 CARDIAC PACEMAKER IN SITU: ICD-10-CM

## 2019-08-29 ENCOUNTER — RECORDS - HEALTHEAST (OUTPATIENT)
Dept: ADMINISTRATIVE | Facility: OTHER | Age: 84
End: 2019-08-29

## 2019-09-22 DIAGNOSIS — I10 BENIGN ESSENTIAL HYPERTENSION: ICD-10-CM

## 2019-09-23 DIAGNOSIS — L03.116 LEFT LEG CELLULITIS: ICD-10-CM

## 2019-09-23 RX ORDER — DOXYCYCLINE HYCLATE 150 MG/1
150 TABLET ORAL 2 TIMES DAILY
Qty: 60 TABLET | Status: CANCELLED | OUTPATIENT
Start: 2019-09-23

## 2019-09-23 RX ORDER — LISINOPRIL 5 MG/1
TABLET ORAL
Qty: 90 TABLET | Refills: 0 | Status: SHIPPED | OUTPATIENT
Start: 2019-09-23 | End: 2019-10-30

## 2019-09-23 NOTE — TELEPHONE ENCOUNTER
Doxycycline Hyclate 150 MG TABS      Last Written Prescription Date:  7/11/15  Last Fill Quantity: 60,   # refills: 0  Last Office Visit: 1/16/19  Future Office visit:       Requested Prescriptions   Pending Prescriptions Disp Refills     Doxycycline Hyclate 150 MG TABS 60 tablet      Sig: Take 150 mg by mouth 2 times daily Resume once course of levaquin is completed       There is no refill protocol information for this order

## 2019-09-23 NOTE — TELEPHONE ENCOUNTER
Routing refill request to provider for review/approval because:  Drug not on the [facility] refill protocol     Kusum Bowman RN

## 2019-09-23 NOTE — TELEPHONE ENCOUNTER
"LISINOPRIL 5MG TABS      Last Written Prescription Date:  3/26/19  Last Fill Quantity: 90,   # refills: 1  Last Office Visit: 1/16/19  Future Office visit:       Requested Prescriptions   Pending Prescriptions Disp Refills     lisinopril (PRINIVIL/ZESTRIL) 5 MG tablet [Pharmacy Med Name: LISINOPRIL 5MG TABS] 90 tablet 1     Sig: TAKE ONE TABLET BY MOUTH ONCE DAILY       ACE Inhibitors (Including Combos) Protocol Passed - 9/22/2019  5:02 AM        Passed - Blood pressure under 140/90 in past 12 months     BP Readings from Last 3 Encounters:   01/16/19 136/82   11/15/18 122/74   10/04/18 135/82                 Passed - Recent (12 mo) or future (30 days) visit within the authorizing provider's specialty     Patient had office visit in the last 12 months or has a visit in the next 30 days with authorizing provider or within the authorizing provider's specialty.  See \"Patient Info\" tab in inbasket, or \"Choose Columns\" in Meds & Orders section of the refill encounter.              Passed - Medication is active on med list        Passed - Patient is age 18 or older        Passed - No active pregnancy on record        Passed - Normal serum creatinine on file in past 12 months     Recent Labs   Lab Test 06/17/19  1450   CR 0.55             Passed - Normal serum potassium on file in past 12 months     Recent Labs   Lab Test 06/17/19  1450   POTASSIUM 4.6             Passed - No positive pregnancy test within past 12 months          "

## 2019-09-26 DIAGNOSIS — E87.1 HYPONATREMIA: ICD-10-CM

## 2019-09-26 LAB — SODIUM SERPL-SCNC: 130 MMOL/L (ref 133–144)

## 2019-09-26 PROCEDURE — 36415 COLL VENOUS BLD VENIPUNCTURE: CPT | Performed by: FAMILY MEDICINE

## 2019-09-26 PROCEDURE — 84295 ASSAY OF SERUM SODIUM: CPT | Performed by: FAMILY MEDICINE

## 2019-10-21 ENCOUNTER — AMBULATORY - HEALTHEAST (OUTPATIENT)
Dept: CARDIOLOGY | Facility: CLINIC | Age: 84
End: 2019-10-21

## 2019-10-21 DIAGNOSIS — Z95.0 CARDIAC PACEMAKER IN SITU: ICD-10-CM

## 2019-10-21 ASSESSMENT — MIFFLIN-ST. JEOR: SCORE: 1031.43

## 2019-10-22 DIAGNOSIS — L03.116 LEFT LEG CELLULITIS: ICD-10-CM

## 2019-10-22 DIAGNOSIS — Z96.659 INFECTED PROSTHETIC KNEE JOINT, SEQUELA: Primary | ICD-10-CM

## 2019-10-22 DIAGNOSIS — T84.59XS INFECTED PROSTHETIC KNEE JOINT, SEQUELA: Primary | ICD-10-CM

## 2019-10-22 NOTE — TELEPHONE ENCOUNTER
penicillin V potassium (VEETID) 500 MG tablet      Last Written Prescription Date:  4/25/16  Last Fill Quantity: na,   # refills: na  Last Office Visit: 1/16/19  Future Office visit:       Doxycycline Hyclate 150 MG TABS      Last Written Prescription Date:  7/11/15  Last Fill Quantity: 60,   # refills: na  Last Office Visit: 1/16/19  Future Office visit:           Requested Prescriptions   Pending Prescriptions Disp Refills     penicillin V (VEETID) 500 MG tablet       Sig: Take 1 tablet (500 mg) by mouth 2 times daily       There is no refill protocol information for this order

## 2019-10-23 ENCOUNTER — COMMUNICATION - HEALTHEAST (OUTPATIENT)
Dept: ADMINISTRATIVE | Facility: CLINIC | Age: 84
End: 2019-10-23

## 2019-10-23 DIAGNOSIS — T84.50XA INFECTION AND INFLAMMATORY REACTION DUE TO INTERNAL JOINT PROSTHESIS (H): ICD-10-CM

## 2019-10-24 ENCOUNTER — TELEPHONE (OUTPATIENT)
Dept: FAMILY MEDICINE | Facility: CLINIC | Age: 84
End: 2019-10-24

## 2019-10-24 RX ORDER — PENICILLIN V POTASSIUM 500 MG/1
500 TABLET, FILM COATED ORAL 2 TIMES DAILY
Qty: 180 TABLET | Refills: 3 | Status: SHIPPED | OUTPATIENT
Start: 2019-10-24 | End: 2021-11-01

## 2019-10-24 RX ORDER — DOXYCYCLINE HYCLATE 150 MG/1
150 TABLET ORAL 2 TIMES DAILY
Qty: 180 TABLET | Refills: 3 | Status: SHIPPED | OUTPATIENT
Start: 2019-10-24 | End: 2021-11-01

## 2019-10-24 NOTE — TELEPHONE ENCOUNTER
doxycycline hyclate is not cover by pt insurance and the monohydrate is ready for pt to  do you want to change?      Thank You,  Vinay Aranda, [facility]  On behalf of [facility]

## 2019-10-24 NOTE — TELEPHONE ENCOUNTER
No I don't want to change. Give her the covered one. She usually gets these from the ID doctor  Who sent in that prescription? Constance Cohen M.D.

## 2019-10-30 ENCOUNTER — OFFICE VISIT (OUTPATIENT)
Dept: FAMILY MEDICINE | Facility: CLINIC | Age: 84
End: 2019-10-30
Payer: MEDICARE

## 2019-10-30 VITALS
WEIGHT: 138 LBS | BODY MASS INDEX: 22.99 KG/M2 | HEIGHT: 65 IN | HEART RATE: 88 BPM | DIASTOLIC BLOOD PRESSURE: 80 MMHG | SYSTOLIC BLOOD PRESSURE: 136 MMHG | TEMPERATURE: 98 F

## 2019-10-30 DIAGNOSIS — E87.1 HYPONATREMIA: ICD-10-CM

## 2019-10-30 DIAGNOSIS — I10 BENIGN ESSENTIAL HYPERTENSION: ICD-10-CM

## 2019-10-30 DIAGNOSIS — K21.9 GASTROESOPHAGEAL REFLUX DISEASE WITHOUT ESOPHAGITIS: ICD-10-CM

## 2019-10-30 DIAGNOSIS — L21.9 SEBORRHEIC DERMATITIS: Primary | ICD-10-CM

## 2019-10-30 PROCEDURE — 99214 OFFICE O/P EST MOD 30 MIN: CPT | Performed by: FAMILY MEDICINE

## 2019-10-30 RX ORDER — LISINOPRIL 5 MG/1
5 TABLET ORAL DAILY
Qty: 90 TABLET | Refills: 2 | Status: SHIPPED | OUTPATIENT
Start: 2019-10-30 | End: 2020-09-03

## 2019-10-30 RX ORDER — VENLAFAXINE HYDROCHLORIDE 37.5 MG/1
37.5 CAPSULE, EXTENDED RELEASE ORAL
COMMUNITY
Start: 2019-03-29

## 2019-10-30 RX ORDER — TRIAMCINOLONE ACETONIDE 1 MG/ML
LOTION TOPICAL 2 TIMES DAILY
Qty: 60 ML | Refills: 3 | Status: SHIPPED | OUTPATIENT
Start: 2019-10-30 | End: 2021-01-12

## 2019-10-30 RX ORDER — KETOCONAZOLE 20 MG/ML
SHAMPOO TOPICAL
Qty: 120 ML | Refills: 3 | Status: SHIPPED | OUTPATIENT
Start: 2019-10-30 | End: 2022-03-02

## 2019-10-30 ASSESSMENT — ANXIETY QUESTIONNAIRES
3. WORRYING TOO MUCH ABOUT DIFFERENT THINGS: NOT AT ALL
5. BEING SO RESTLESS THAT IT IS HARD TO SIT STILL: NOT AT ALL
GAD7 TOTAL SCORE: 0
7. FEELING AFRAID AS IF SOMETHING AWFUL MIGHT HAPPEN: NOT AT ALL
1. FEELING NERVOUS, ANXIOUS, OR ON EDGE: NOT AT ALL
6. BECOMING EASILY ANNOYED OR IRRITABLE: NOT AT ALL
2. NOT BEING ABLE TO STOP OR CONTROL WORRYING: NOT AT ALL

## 2019-10-30 ASSESSMENT — PATIENT HEALTH QUESTIONNAIRE - PHQ9
SUM OF ALL RESPONSES TO PHQ QUESTIONS 1-9: 0
5. POOR APPETITE OR OVEREATING: NOT AT ALL

## 2019-10-30 ASSESSMENT — MIFFLIN-ST. JEOR: SCORE: 1056.84

## 2019-10-30 NOTE — PROGRESS NOTES
Subjective     Arielle Stallworth is a 88 year old female who presents to clinic today for the following health issues:    HPI     Chief Complaint   Patient presents with     Hair/Scalp Problem     recheck skin condition on scalp. having a lot of dandruff.     Patient has health care directive and will bring copy in for the chart.     Refill request for Lisinopril and Omeprazole.    BP Readings from Last 6 Encounters:   10/30/19 136/80   01/16/19 136/82   11/15/18 122/74   10/04/18 135/82   09/19/18 137/77   07/25/18 120/74       She is doing well otherwise   The ect worked great she did just start back on low dose effexor.   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      HYSTERECTOMY       HYSTERECTOMY RADICAL      2002     JOINT REPLACEMENT      Left x2 and rt x1     ORTHOPEDIC SURGERY      Bilat. knees       Social History     Tobacco Use     Smoking status: Former Smoker     Packs/day: 0.50     Years: 30.00     Pack years: 15.00  "    Types: Cigarettes     Last attempt to quit: 1960     Years since quittin.8     Smokeless tobacco: Never Used   Substance Use Topics     Alcohol use: Yes     Comment: Occasional     History reviewed. No pertinent family history.          Reviewed and updated as needed this visit by Provider         Review of Systems   ROS COMP: Constitutional, HEENT, cardiovascular, pulmonary, GI, , musculoskeletal, neuro, skin, endocrine and psych systems are negative, except as otherwise noted.      Objective    /80   Pulse 88   Temp 98  F (36.7  C) (Tympanic)   Ht 1.651 m (5' 5\")   Wt 62.6 kg (138 lb)   BMI 22.96 kg/m    Body mass index is 22.96 kg/m .  Physical Exam   GENERAL: healthy, alert and no distress  NECK: no adenopathy, no asymmetry, masses, or scars and thyroid normal to palpation  RESP: lungs clear to auscultation - no rales, rhonchi or wheezes  CV: regular rate and rhythm, normal S1 S2, no S3 or S4, no murmur, click or rub, no peripheral edema and peripheral pulses strong  MS: in wheelchair with left knee orthotic on     Diagnostic Test Results:  Labs reviewed in Epic  none         Assessment & Plan     1. Seborrheic dermatitis  Will try this combo   - ketoconazole (NIZORAL) 2 % external shampoo; Apply to scalp lather let sit for 5 minutes then rinse 2-3 times per week  Dispense: 120 mL; Refill: 3  - triamcinolone (KENALOG) 0.1 % external lotion; Apply topically 2 times daily  Dispense: 60 mL; Refill: 3    2. Gastroesophageal reflux disease without esophagitis  Stable   - omeprazole (PRILOSEC) 20 MG DR capsule; Take 1 capsule (20 mg) by mouth daily  Dispense: 90 capsule; Refill: 3    3. Benign essential hypertension  Well controlled   - lisinopril (PRINIVIL/ZESTRIL) 5 MG tablet; Take 1 tablet (5 mg) by mouth daily  Dispense: 90 tablet; Refill: 2    4. Hyponatremia  No sx now 130 last month   - Sodium; Standing       Patient Instructions   Try the 2 things for the scalp   I have put in the " standing sodium order         No follow-ups on file.    Constance Cohen MD  [facility]

## 2019-10-31 ASSESSMENT — ANXIETY QUESTIONNAIRES: GAD7 TOTAL SCORE: 0

## 2019-11-05 ENCOUNTER — OFFICE VISIT - HEALTHEAST (OUTPATIENT)
Dept: INFECTIOUS DISEASES | Facility: CLINIC | Age: 84
End: 2019-11-05

## 2019-11-05 ENCOUNTER — COMMUNICATION - HEALTHEAST (OUTPATIENT)
Dept: INFECTIOUS DISEASES | Facility: CLINIC | Age: 84
End: 2019-11-05

## 2019-11-05 DIAGNOSIS — Z96.659 INFECTED PROSTHETIC KNEE JOINT, SUBSEQUENT ENCOUNTER: ICD-10-CM

## 2019-11-05 DIAGNOSIS — T84.50XA INFECTION AND INFLAMMATORY REACTION DUE TO INTERNAL JOINT PROSTHESIS (H): ICD-10-CM

## 2019-11-05 DIAGNOSIS — T84.59XD INFECTED PROSTHETIC KNEE JOINT, SUBSEQUENT ENCOUNTER: ICD-10-CM

## 2020-01-06 ENCOUNTER — OFFICE VISIT - HEALTHEAST (OUTPATIENT)
Dept: VASCULAR SURGERY | Facility: CLINIC | Age: 85
End: 2020-01-06

## 2020-01-06 DIAGNOSIS — M25.362 UNSTABLE KNEE, LEFT: ICD-10-CM

## 2020-01-06 DIAGNOSIS — I89.0 LYMPHEDEMA OF LEFT LOWER EXTREMITY: ICD-10-CM

## 2020-01-06 DIAGNOSIS — L90.5 SCAR CONDITION AND FIBROSIS OF SKIN: ICD-10-CM

## 2020-01-06 DIAGNOSIS — C55 MALIGNANT NEOPLASM OF UTERUS, UNSPECIFIED SITE (H): ICD-10-CM

## 2020-01-06 DIAGNOSIS — M79.89 LEFT LEG SWELLING: ICD-10-CM

## 2020-01-06 DIAGNOSIS — M21.70 ACQUIRED LEG LENGTH DISCREPANCY: ICD-10-CM

## 2020-01-06 DIAGNOSIS — R26.9 GAIT ABNORMALITY: ICD-10-CM

## 2020-01-06 ASSESSMENT — MIFFLIN-ST. JEOR: SCORE: 1010.11

## 2020-01-23 ENCOUNTER — AMBULATORY - HEALTHEAST (OUTPATIENT)
Dept: CARDIOLOGY | Facility: CLINIC | Age: 85
End: 2020-01-23

## 2020-01-23 DIAGNOSIS — Z95.0 CARDIAC PACEMAKER IN SITU: ICD-10-CM

## 2020-01-23 DIAGNOSIS — I44.2 COMPLETE ATRIOVENTRICULAR BLOCK (H): ICD-10-CM

## 2020-02-10 ENCOUNTER — TELEPHONE (OUTPATIENT)
Dept: FAMILY MEDICINE | Facility: CLINIC | Age: 85
End: 2020-02-10

## 2020-02-24 ENCOUNTER — RECORDS - HEALTHEAST (OUTPATIENT)
Dept: ADMINISTRATIVE | Facility: OTHER | Age: 85
End: 2020-02-24

## 2020-02-24 DIAGNOSIS — E87.1 HYPONATREMIA: ICD-10-CM

## 2020-02-24 LAB — SODIUM SERPL-SCNC: 134 MMOL/L (ref 133–144)

## 2020-02-24 PROCEDURE — 36415 COLL VENOUS BLD VENIPUNCTURE: CPT | Performed by: FAMILY MEDICINE

## 2020-02-24 PROCEDURE — 84295 ASSAY OF SERUM SODIUM: CPT | Performed by: FAMILY MEDICINE

## 2020-03-01 ENCOUNTER — HEALTH MAINTENANCE LETTER (OUTPATIENT)
Age: 85
End: 2020-03-01

## 2020-04-23 ENCOUNTER — AMBULATORY - HEALTHEAST (OUTPATIENT)
Dept: CARDIOLOGY | Facility: CLINIC | Age: 85
End: 2020-04-23

## 2020-04-23 DIAGNOSIS — I44.2 ATRIOVENTRICULAR BLOCK, COMPLETE (H): ICD-10-CM

## 2020-04-23 DIAGNOSIS — Z95.0 CARDIAC PACEMAKER IN SITU: ICD-10-CM

## 2020-04-30 ENCOUNTER — TRANSFERRED RECORDS (OUTPATIENT)
Dept: HEALTH INFORMATION MANAGEMENT | Facility: CLINIC | Age: 85
End: 2020-04-30

## 2020-07-23 ENCOUNTER — AMBULATORY - HEALTHEAST (OUTPATIENT)
Dept: CARDIOLOGY | Facility: CLINIC | Age: 85
End: 2020-07-23

## 2020-07-23 DIAGNOSIS — Z95.0 CARDIAC PACEMAKER IN SITU: ICD-10-CM

## 2020-07-23 DIAGNOSIS — I44.2 COMPLETE ATRIOVENTRICULAR BLOCK (H): ICD-10-CM

## 2020-08-13 ENCOUNTER — TRANSFERRED RECORDS (OUTPATIENT)
Dept: HEALTH INFORMATION MANAGEMENT | Facility: CLINIC | Age: 85
End: 2020-08-13

## 2020-08-25 DIAGNOSIS — E87.1 HYPONATREMIA: ICD-10-CM

## 2020-08-25 PROCEDURE — 84295 ASSAY OF SERUM SODIUM: CPT | Performed by: FAMILY MEDICINE

## 2020-08-25 PROCEDURE — 36415 COLL VENOUS BLD VENIPUNCTURE: CPT | Performed by: FAMILY MEDICINE

## 2020-08-26 LAB — SODIUM SERPL-SCNC: 136 MMOL/L (ref 133–144)

## 2020-09-03 DIAGNOSIS — I10 BENIGN ESSENTIAL HYPERTENSION: ICD-10-CM

## 2020-09-03 RX ORDER — LISINOPRIL 5 MG/1
TABLET ORAL
Qty: 90 TABLET | Refills: 2 | Status: SHIPPED | OUTPATIENT
Start: 2020-09-03 | End: 2021-06-10

## 2020-09-03 NOTE — TELEPHONE ENCOUNTER
Routing refill request to provider for review/approval because:  Labs not current:    Shy Jacinto RN

## 2020-09-16 ENCOUNTER — TRANSFERRED RECORDS (OUTPATIENT)
Dept: HEALTH INFORMATION MANAGEMENT | Facility: CLINIC | Age: 85
End: 2020-09-16

## 2020-10-19 DIAGNOSIS — K21.9 GASTROESOPHAGEAL REFLUX DISEASE WITHOUT ESOPHAGITIS: ICD-10-CM

## 2020-10-21 ENCOUNTER — COMMUNICATION - HEALTHEAST (OUTPATIENT)
Dept: CARDIOLOGY | Facility: CLINIC | Age: 85
End: 2020-10-21

## 2020-10-21 NOTE — TELEPHONE ENCOUNTER
"From Dr. Cohen's note 10/30/19: \"She continues on the prilosec as long as she is taking the antibiotics which will likely be for life \"    Please recommend patient due for follow up   "

## 2020-10-21 NOTE — TELEPHONE ENCOUNTER
Routing refill request to provider for review/approval because:  Would like Provider to decide how long patient should be on omeprazole.  Thank you.  Bryant Raman RN

## 2020-10-22 ENCOUNTER — AMBULATORY - HEALTHEAST (OUTPATIENT)
Dept: CARDIOLOGY | Facility: CLINIC | Age: 85
End: 2020-10-22

## 2020-10-22 ENCOUNTER — COMMUNICATION - HEALTHEAST (OUTPATIENT)
Dept: ADMINISTRATIVE | Facility: CLINIC | Age: 85
End: 2020-10-22

## 2020-10-22 DIAGNOSIS — I44.2 ATRIOVENTRICULAR BLOCK, COMPLETE (H): ICD-10-CM

## 2020-10-22 DIAGNOSIS — T84.50XA INFECTION AND INFLAMMATORY REACTION DUE TO INTERNAL JOINT PROSTHESIS (H): ICD-10-CM

## 2020-10-22 DIAGNOSIS — I44.2 COMPLETE ATRIOVENTRICULAR BLOCK (H): ICD-10-CM

## 2020-10-22 DIAGNOSIS — Z95.0 CARDIAC PACEMAKER IN SITU: ICD-10-CM

## 2020-10-22 ASSESSMENT — MIFFLIN-ST. JEOR: SCORE: 1035.96

## 2020-12-17 ENCOUNTER — COMMUNICATION - HEALTHEAST (OUTPATIENT)
Dept: INFECTIOUS DISEASES | Facility: CLINIC | Age: 85
End: 2020-12-17

## 2020-12-18 ENCOUNTER — OFFICE VISIT - HEALTHEAST (OUTPATIENT)
Dept: INFECTIOUS DISEASES | Facility: CLINIC | Age: 85
End: 2020-12-18

## 2020-12-18 DIAGNOSIS — U07.1 INFECTION DUE TO 2019 NOVEL CORONAVIRUS: ICD-10-CM

## 2020-12-19 ENCOUNTER — COMMUNICATION - HEALTHEAST (OUTPATIENT)
Dept: INFECTIOUS DISEASES | Facility: CLINIC | Age: 85
End: 2020-12-19

## 2020-12-19 ENCOUNTER — INFUSION - HEALTHEAST (OUTPATIENT)
Dept: INFECTIOUS DISEASES | Facility: CLINIC | Age: 85
End: 2020-12-19

## 2020-12-19 DIAGNOSIS — Z20.822 SUSPECTED COVID-19 VIRUS INFECTION: ICD-10-CM

## 2020-12-20 ENCOUNTER — AMBULATORY - HEALTHEAST (OUTPATIENT)
Dept: FAMILY MEDICINE | Facility: CLINIC | Age: 85
End: 2020-12-20

## 2020-12-20 DIAGNOSIS — U07.1 2019 NOVEL CORONAVIRUS DISEASE (COVID-19): ICD-10-CM

## 2020-12-23 ENCOUNTER — DOCUMENTATION ONLY (OUTPATIENT)
Dept: PHARMACY | Facility: CLINIC | Age: 85
End: 2020-12-23

## 2020-12-23 NOTE — PROGRESS NOTES
Skilled Nurse visit in the [facility] on 12/22 to administer BAMLANIVIMAB 700 MG in 200 ML NACL 0.9%.  PIV placed R AC, 1 attempt/s.   Infusion completed without complication or reaction. Pt discharged via wheelchair to her daughter's car.     Marla Hernandez RN  Field Nurse   [facility]   [address] [place] [address]   [email or web address]   Cell: [phone]  Fax [phone]

## 2020-12-28 ENCOUNTER — TRANSFERRED RECORDS (OUTPATIENT)
Dept: HEALTH INFORMATION MANAGEMENT | Facility: CLINIC | Age: 85
End: 2020-12-28

## 2020-12-28 LAB
ALT SERPL-CCNC: 18 IU/L (ref 8–45)
AST SERPL-CCNC: 24 IU/L (ref 2–40)
INR PPP: 1

## 2021-01-01 LAB
CREAT SERPL-MCNC: 0.55 MG/DL (ref 0.57–1.11)
GFR SERPL CREATININE-BSD FRML MDRD: >60 ML/MIN/1.73M2
GLUCOSE SERPL-MCNC: 97 MG/DL (ref 65–100)
POTASSIUM SERPL-SCNC: 3.6 MMOL/L (ref 3.5–5)

## 2021-01-08 ENCOUNTER — TELEPHONE (OUTPATIENT)
Dept: FAMILY MEDICINE | Facility: CLINIC | Age: 86
End: 2021-01-08

## 2021-01-08 ENCOUNTER — COMMUNICATION - HEALTHEAST (OUTPATIENT)
Dept: VASCULAR SURGERY | Facility: CLINIC | Age: 86
End: 2021-01-08

## 2021-01-08 NOTE — TELEPHONE ENCOUNTER
Reason for Call:  Other Update    Detailed comments: Home Care was out to see Arielle today and she noticed that her lower extremities had increasing edema.  She listened to her lungs and in her lower lobe she does have crackles.  Arielle denies SOB.  She does have a vascular appointment on 1/11/21 and appointment with Dr. Cohen on 1/12/21.  Thank you..Lucia Edwards    Phone Number Patient can be reached at: Home Care [phone]    Best Time: any time if questions    Can we leave a detailed message on this number? YES    Call taken on 1/8/2021 at 3:15 PM by Lucia Edwards

## 2021-01-11 ENCOUNTER — OFFICE VISIT - HEALTHEAST (OUTPATIENT)
Dept: VASCULAR SURGERY | Facility: CLINIC | Age: 86
End: 2021-01-11

## 2021-01-11 ENCOUNTER — COMMUNICATION - HEALTHEAST (OUTPATIENT)
Dept: VASCULAR SURGERY | Facility: CLINIC | Age: 86
End: 2021-01-11

## 2021-01-11 DIAGNOSIS — U07.1 2019 NOVEL CORONAVIRUS DISEASE (COVID-19): ICD-10-CM

## 2021-01-11 DIAGNOSIS — M21.70 ACQUIRED LEG LENGTH DISCREPANCY: ICD-10-CM

## 2021-01-11 DIAGNOSIS — I89.0 LYMPHEDEMA OF LEFT LOWER EXTREMITY: ICD-10-CM

## 2021-01-11 DIAGNOSIS — C55 MALIGNANT NEOPLASM OF UTERUS, UNSPECIFIED SITE (H): ICD-10-CM

## 2021-01-11 DIAGNOSIS — R26.9 GAIT ABNORMALITY: ICD-10-CM

## 2021-01-11 DIAGNOSIS — M21.952: ICD-10-CM

## 2021-01-11 DIAGNOSIS — L90.5 SCAR CONDITION AND FIBROSIS OF SKIN: ICD-10-CM

## 2021-01-11 DIAGNOSIS — M79.89 LEFT LEG SWELLING: ICD-10-CM

## 2021-01-11 DIAGNOSIS — M25.362 UNSTABLE KNEE, LEFT: ICD-10-CM

## 2021-01-11 ASSESSMENT — MIFFLIN-ST. JEOR: SCORE: 1017.82

## 2021-01-12 ENCOUNTER — OFFICE VISIT (OUTPATIENT)
Dept: FAMILY MEDICINE | Facility: CLINIC | Age: 86
End: 2021-01-12
Payer: MEDICARE

## 2021-01-12 ENCOUNTER — MEDICAL CORRESPONDENCE (OUTPATIENT)
Dept: HEALTH INFORMATION MANAGEMENT | Facility: CLINIC | Age: 86
End: 2021-01-12

## 2021-01-12 VITALS
SYSTOLIC BLOOD PRESSURE: 112 MMHG | DIASTOLIC BLOOD PRESSURE: 68 MMHG | HEART RATE: 90 BPM | OXYGEN SATURATION: 100 % | TEMPERATURE: 98.8 F

## 2021-01-12 DIAGNOSIS — I89.0 LYMPHEDEMA OF BOTH LOWER EXTREMITIES: Primary | ICD-10-CM

## 2021-01-12 DIAGNOSIS — D50.8 OTHER IRON DEFICIENCY ANEMIA: ICD-10-CM

## 2021-01-12 DIAGNOSIS — E87.1 HYPONATREMIA: ICD-10-CM

## 2021-01-12 DIAGNOSIS — Z85.819 HISTORY OF ORAL CANCER: ICD-10-CM

## 2021-01-12 LAB
ANION GAP SERPL CALCULATED.3IONS-SCNC: 3 MMOL/L (ref 3–14)
BUN SERPL-MCNC: 17 MG/DL (ref 7–30)
CALCIUM SERPL-MCNC: 9.2 MG/DL (ref 8.5–10.1)
CHLORIDE SERPL-SCNC: 104 MMOL/L (ref 94–109)
CO2 SERPL-SCNC: 27 MMOL/L (ref 20–32)
CREAT SERPL-MCNC: 0.52 MG/DL (ref 0.52–1.04)
ERYTHROCYTE [DISTWIDTH] IN BLOOD BY AUTOMATED COUNT: 13.3 % (ref 10–15)
GFR SERPL CREATININE-BSD FRML MDRD: 84 ML/MIN/{1.73_M2}
GLUCOSE SERPL-MCNC: 131 MG/DL (ref 70–99)
HCT VFR BLD AUTO: 32 % (ref 35–47)
HGB BLD-MCNC: 10.1 G/DL (ref 11.7–15.7)
MCH RBC QN AUTO: 30.2 PG (ref 26.5–33)
MCHC RBC AUTO-ENTMCNC: 31.6 G/DL (ref 31.5–36.5)
MCV RBC AUTO: 96 FL (ref 78–100)
PLATELET # BLD AUTO: 367 10E9/L (ref 150–450)
POTASSIUM SERPL-SCNC: 4.6 MMOL/L (ref 3.4–5.3)
RBC # BLD AUTO: 3.34 10E12/L (ref 3.8–5.2)
SODIUM SERPL-SCNC: 134 MMOL/L (ref 133–144)
WBC # BLD AUTO: 5.9 10E9/L (ref 4–11)

## 2021-01-12 PROCEDURE — 85027 COMPLETE CBC AUTOMATED: CPT | Performed by: FAMILY MEDICINE

## 2021-01-12 PROCEDURE — 80048 BASIC METABOLIC PNL TOTAL CA: CPT | Performed by: FAMILY MEDICINE

## 2021-01-12 PROCEDURE — 99214 OFFICE O/P EST MOD 30 MIN: CPT | Performed by: FAMILY MEDICINE

## 2021-01-12 PROCEDURE — 36415 COLL VENOUS BLD VENIPUNCTURE: CPT | Performed by: FAMILY MEDICINE

## 2021-01-12 NOTE — PATIENT INSTRUCTIONS
Cont with the vitamin d and the flintstone vitamin  Take 1000mcg of b12 daily place under   Stay on the omeprazole  You can take the lorazepam as needed up to 5 times per week  Patient Education     Treating Incontinence in Women: Nonsurgical Methods     The best treatment for you will depend on the type of incontinence you have. Your symptoms, age, and any underlying problems that are found also affect your treatment. Some types of incontinence may over time require surgery. But nonsurgical treatments may be effective in many cases. Nonsurgical treatments include lifestyle changes, muscle-strengthening exercises, and medicines.   Nonsurgical treatments  Treatment for stress urinary incontinence includes:     Bladder training    Lifestyle changes such as weight loss and increased activity if incontinence is due to being overweight    Medicines, if bladder training has not helped    Pelvic floor muscle exercises  Lifestyle changes    Losing weight. Excess weight puts extra pressure on the pelvic floor muscles. Exercising and eating right can help you lose weight. This helps other treatments work better.    Making certain diet changes. Some foods may make you need to urinate more, so it may be good not to have them. These include caffeinated drinks and alcohol. Ask your healthcare provider if these or other diet changes might be helpful.    Quitting smoking. Smoking can lead to a long-term (chronic) cough that strains pelvic floor muscles. Smoking may also damage the bladder and urethra.    Pelvic floor muscle exercises  There are exercises you can do to help strengthen your pelvic floor muscles. The pelvic floor muscles act as a sling to help hold the bladder and urethra in place. These muscles also help keep the urethra closed. Weak pelvic floor muscles may allow urine to leak. To strengthen the pelvic floor muscles, do the exercises daily. In a few months, the muscles will be stronger and tighter. This can help  prevent urine leakage.   [employer] last reviewed this educational content on 9/1/2019 2000-2020 [employer]. [address] [place] [address], [place] [address]. All rights reserved. This information is not intended as a substitute for professional medical care. Always follow your healthcare professional's instructions.

## 2021-01-12 NOTE — PROGRESS NOTES
Stacy Guzmán is a 89 year old who presents to clinic today for the following health issues     HPI     *Discuss Vitamin D stay pn this for now  *Discuss Shampoo   *Taking daily [place] with iron   * Discuss omeprazole    Hospital Follow-up Visit:    Hospital/Nursing Home/IP Rehab Facility: [facility]  Date of Admission: 12/28/20  Date of Discharge: 1/2/21  Reason(s) for Admission:   Diverticulitis (Primary Dx);   Elevated troponin;   COVID-19;   Rhinitis, unspecified type      Was your hospitalization related to COVID-19? No   Problems taking medications regularly:  None  Medication changes since discharge: see list  Problems adhering to non-medication therapy:  None    Summary of hospitalization:  CareEverywhere information obtained and reviewed  Diagnostic Tests/Treatments reviewed.  Follow up needed: labs and home heaalth care   Other Healthcare Providers Involved in Patient s Care:         Homecare  Update since discharge: improved.   Post Discharge Medication Reconciliation: discharge medications reconciled, continue medications without change.  Plan of care communicated with patient and family          Sept had oral cancer now well treated  Mood is good   Reviewed her latest hospital stay she is much better.        Review of Systems   Constitutional, HEENT, cardiovascular, pulmonary, gi and gu systems are negative, except as otherwise noted.      Objective    /68   Pulse 90   Temp 98.8  F (37.1  C) (Tympanic)   SpO2 100%   There is no height or weight on file to calculate BMI.  Physical Exam   GENERAL: healthy, alert and no distress  NECK: no adenopathy, no asymmetry, masses, or scars and thyroid normal to palpation  RESP: lungs clear to auscultation - no rales, rhonchi or wheezes  CV: regular rate and rhythm, normal S1 S2, no S3 or S4, no murmur, click or rub, no peripheral edema and peripheral pulses strong  ABDOMEN: soft, nontender, no hepatosplenomegaly, no masses and bowel  sounds normal  MS: in wheelchair left leg with edema right with compression stocking     Transferred Records on 12/28/2020   Component Date Value Ref Range Status     ALT 12/28/2020 18  8 - 45 IU/L Final     AST 12/28/2020 24  2 - 40 IU/L Final     INR 12/28/2020 1.0  <1.3 Final     Creatinine 01/01/2021 0.55* 0.57 - 1.11 mg/dL Final     GFR Estimate 01/01/2021 >60  >60 ml/min/1.73m2 Final     GFR Estimate If Black 01/01/2021 >60  >60 ml/min/1.73m2 Final     Glucose 01/01/2021 97  65 - 100 mg/dL Final     Potassium 01/01/2021 3.6  3.5 - 5.0 mmol/L Final     Results for orders placed or performed in visit on 01/12/21   CBC with platelets     Status: Abnormal   Result Value Ref Range    WBC 5.9 4.0 - 11.0 10e9/L    RBC Count 3.34 (L) 3.8 - 5.2 10e12/L    Hemoglobin 10.1 (L) 11.7 - 15.7 g/dL    Hematocrit 32.0 (L) 35.0 - 47.0 %    MCV 96 78 - 100 fl    MCH 30.2 26.5 - 33.0 pg    MCHC 31.6 31.5 - 36.5 g/dL    RDW 13.3 10.0 - 15.0 %    Platelet Count 367 150 - 450 10e9/L   Basic metabolic panel     Status: Abnormal   Result Value Ref Range    Sodium 134 133 - 144 mmol/L    Potassium 4.6 3.4 - 5.3 mmol/L    Chloride 104 94 - 109 mmol/L    Carbon Dioxide 27 20 - 32 mmol/L    Anion Gap 3 3 - 14 mmol/L    Glucose 131 (H) 70 - 99 mg/dL    Urea Nitrogen 17 7 - 30 mg/dL    Creatinine 0.52 0.52 - 1.04 mg/dL    GFR Estimate 84 >60 mL/min/[1.73_m2]    GFR Estimate If Black >90 >60 mL/min/[1.73_m2]    Calcium 9.2 8.5 - 10.1 mg/dL         1. Lymphedema of both lower extremities    - Basic metabolic panel    2. Hyponatremia  Will recheck as she has large fluctuations with this normal   - Basic metabolic panel    3. Other iron deficiency anemia  Recheck   - CBC with platelets      4. History of oral cancer  Doing well at present.  Cont with the vitamin d and the [place] vitamin  Take 1000mcg of b12 daily place under   Stay on the omeprazole  You can take the lorazepam as needed up to 5 times per week    Constance Cohen M.D.

## 2021-01-13 ENCOUNTER — COMMUNICATION - HEALTHEAST (OUTPATIENT)
Dept: OTHER | Facility: CLINIC | Age: 86
End: 2021-01-13

## 2021-01-13 DIAGNOSIS — K21.9 GASTROESOPHAGEAL REFLUX DISEASE WITHOUT ESOPHAGITIS: ICD-10-CM

## 2021-01-13 NOTE — TELEPHONE ENCOUNTER
Prescription approved per [facility] Refill Protocol; per 1/12/21 office visit notes.  Bryant Raman RN

## 2021-01-21 ENCOUNTER — TELEPHONE (OUTPATIENT)
Dept: FAMILY MEDICINE | Facility: CLINIC | Age: 86
End: 2021-01-21

## 2021-01-21 DIAGNOSIS — D50.8 OTHER IRON DEFICIENCY ANEMIA: Primary | ICD-10-CM

## 2021-01-21 NOTE — TELEPHONE ENCOUNTER
Reason for call:  Patient reporting a symptom    Symptom or request: Pt wants to know if and when she should come in for a repeat hgb and if so, an order needs to be placed.  Pt also wants to discontinue home care services as well.  Please call patient and advise.      Duration (how long have symptoms been present): ongoing    Have you been treated for this before? Yes    Additional comments:     Phone Number patient can be reached at:  Home number on file [phone] (home)    Best Time:  any    Can we leave a detailed message on this number:  YES    Call taken on 1/21/2021 at 2:42 PM by Pilar Hubbard

## 2021-01-21 NOTE — TELEPHONE ENCOUNTER
Reason for Call:  Other Update    Detailed comments: Nuvia from [facility] wanted you to know that Arielle wishes to be discharged from [facility] through [facility].  Reason is that she found out that there were no orders for lab to be drawn and she stated that that was not true.  She would like another company to come and draw her labs if there are any orders.  Nuvia did give her a number to call if she wanted to go that route.  Thank you..Lucia Edwards      Call taken on 1/21/2021 at 3:32 PM by Lucia Edwards

## 2021-01-22 NOTE — TELEPHONE ENCOUNTER
I put in an order for hgb should be redraawn in 2-4 weeks. If she does not want home health that's ok. Should could just have them do the lab asa she is technically homebound. Constance Cohen M.D.

## 2021-01-26 ENCOUNTER — MEDICAL CORRESPONDENCE (OUTPATIENT)
Dept: HEALTH INFORMATION MANAGEMENT | Facility: CLINIC | Age: 86
End: 2021-01-26

## 2021-01-28 ENCOUNTER — AMBULATORY - HEALTHEAST (OUTPATIENT)
Dept: CARDIOLOGY | Facility: CLINIC | Age: 86
End: 2021-01-28

## 2021-01-28 DIAGNOSIS — Z95.0 CARDIAC PACEMAKER IN SITU: ICD-10-CM

## 2021-01-28 DIAGNOSIS — I44.2 COMPLETE ATRIOVENTRICULAR BLOCK (H): ICD-10-CM

## 2021-02-04 ENCOUNTER — COMMUNICATION - HEALTHEAST (OUTPATIENT)
Dept: OTHER | Facility: CLINIC | Age: 86
End: 2021-02-04

## 2021-03-26 ENCOUNTER — TELEPHONE (OUTPATIENT)
Dept: FAMILY MEDICINE | Facility: CLINIC | Age: 86
End: 2021-03-26

## 2021-03-26 NOTE — TELEPHONE ENCOUNTER
Reason for call:  Patient reporting a symptom    Symptom or request: Frances calling in for Arielle. Arielle received her lst COVID vaccine yesterday (3/25/21) and today she woke very shaky, weak and bone cold.  Doesn't have a temperature, Frances explains it as she's just so cold internally.  She vomited this morning a bit and had a couple of sips of water and home health aide put her in bed, where she spit up again.  Not sure what is going on.  Please call daughter Frances.  Thank you..Lucia Edwards    Duration (how long have symptoms been present): this morning    Have you been treated for this before? No    Phone Number patient can be reached at:  Other phone number:  Frances  [phone]    Best Time:  Any time    Can we leave a detailed message on this number:  YES    Call taken on 3/26/2021 at 10:58 AM by Lucia Edwards

## 2021-03-26 NOTE — TELEPHONE ENCOUNTER
Call placed to patient's daughter Frances     Patient's daughter reports patient received her first Covid-19 vaccine yesterday.   This morning, patient woke with a temp of 99.3 temporal, generalized body aches, and chills.   Patient was able to eat breakfast and has been drinking fluids (Pedialyte and water) as she has been having some nausea and had a small emesis x1 this morning   Patient also having increased weakness  Daughter states, she has been encouraging patient to rest and nap    Patient has taken OTC Tylenol for symptoms  Patient is alert and in good spirits   Appetite is diminished; encouraged a bland diet and slowly advance as tolerates  Adequate fluid intake    Denies feeling lightheaded / dizzy  No change in skin color  Vaccine site looks good    Advised to use OTC Tylenol for fever or body aches as directed on the bottle and not to exceed 4000mg in a 24 hour period.   Encouraging rest and fluids  Advised bland diet and slowly advance as tolerates    Daughter expressed understanding.   Will route to provider for further recommendations.     Jason Onofre RN

## 2021-04-05 ENCOUNTER — TRANSFERRED RECORDS (OUTPATIENT)
Dept: HEALTH INFORMATION MANAGEMENT | Facility: CLINIC | Age: 86
End: 2021-04-05

## 2021-04-17 ENCOUNTER — HEALTH MAINTENANCE LETTER (OUTPATIENT)
Age: 86
End: 2021-04-17

## 2021-04-19 ENCOUNTER — COMMUNICATION - HEALTHEAST (OUTPATIENT)
Dept: INFECTIOUS DISEASES | Facility: CLINIC | Age: 86
End: 2021-04-19

## 2021-04-19 DIAGNOSIS — T84.50XA INFECTION AND INFLAMMATORY REACTION DUE TO INTERNAL JOINT PROSTHESIS (H): ICD-10-CM

## 2021-04-21 ENCOUNTER — COMMUNICATION - HEALTHEAST (OUTPATIENT)
Dept: OTHER | Facility: CLINIC | Age: 86
End: 2021-04-21

## 2021-04-29 ENCOUNTER — AMBULATORY - HEALTHEAST (OUTPATIENT)
Dept: CARDIOLOGY | Facility: CLINIC | Age: 86
End: 2021-04-29

## 2021-04-29 DIAGNOSIS — Z95.0 CARDIAC PACEMAKER IN SITU: ICD-10-CM

## 2021-04-29 DIAGNOSIS — I44.2 COMPLETE ATRIOVENTRICULAR BLOCK (H): ICD-10-CM

## 2021-05-11 ENCOUNTER — OFFICE VISIT - HEALTHEAST (OUTPATIENT)
Dept: INFECTIOUS DISEASES | Facility: CLINIC | Age: 86
End: 2021-05-11

## 2021-05-11 DIAGNOSIS — A49.02 MRSA (METHICILLIN RESISTANT STAPHYLOCOCCUS AUREUS) INFECTION: ICD-10-CM

## 2021-05-24 DIAGNOSIS — E87.1 HYPONATREMIA: ICD-10-CM

## 2021-05-24 DIAGNOSIS — D50.8 OTHER IRON DEFICIENCY ANEMIA: ICD-10-CM

## 2021-05-24 DIAGNOSIS — D50.8 OTHER IRON DEFICIENCY ANEMIA: Primary | ICD-10-CM

## 2021-05-24 LAB
HGB BLD-MCNC: 11.4 G/DL (ref 11.7–15.7)
IRON SERPL-MCNC: 54 UG/DL (ref 35–180)
SODIUM SERPL-SCNC: 135 MMOL/L (ref 133–144)

## 2021-05-24 PROCEDURE — 36415 COLL VENOUS BLD VENIPUNCTURE: CPT | Performed by: FAMILY MEDICINE

## 2021-05-24 PROCEDURE — 84295 ASSAY OF SERUM SODIUM: CPT | Performed by: FAMILY MEDICINE

## 2021-05-24 PROCEDURE — 85018 HEMOGLOBIN: CPT | Performed by: FAMILY MEDICINE

## 2021-05-24 PROCEDURE — 83540 ASSAY OF IRON: CPT | Performed by: FAMILY MEDICINE

## 2021-05-25 ENCOUNTER — RECORDS - HEALTHEAST (OUTPATIENT)
Dept: ADMINISTRATIVE | Facility: CLINIC | Age: 86
End: 2021-05-25

## 2021-05-27 ENCOUNTER — RECORDS - HEALTHEAST (OUTPATIENT)
Dept: ADMINISTRATIVE | Facility: CLINIC | Age: 86
End: 2021-05-27

## 2021-05-27 VITALS
SYSTOLIC BLOOD PRESSURE: 114 MMHG | DIASTOLIC BLOOD PRESSURE: 62 MMHG | TEMPERATURE: 98.1 F | HEART RATE: 84 BPM | BODY MASS INDEX: 22.47 KG/M2 | WEIGHT: 135 LBS

## 2021-05-28 ENCOUNTER — RECORDS - HEALTHEAST (OUTPATIENT)
Dept: ADMINISTRATIVE | Facility: CLINIC | Age: 86
End: 2021-05-28

## 2021-05-28 NOTE — PROGRESS NOTES
TTM device check.  Please see link for full device report.  Patient was informed of results and next follow up via phone call.

## 2021-05-28 NOTE — PROGRESS NOTES
Date of Service: 04/22/2019     Date last seen by Dr. Valle: 10/18/18    PCP: Constance Cohen MD    Impression:   1. Left leg swelling under good control  2. Secondary lymphedema   3. Scarring and fibrosis of skin left leg   4. S/p multiple surgeries and right knee prosthesis removal for infections   5. Severe left leg shortening with leg deformity with gait abnormality unstable left knee  6. History of uterine cancer (in her 60's [facility])     Plan:   1. Questions were answered.   2. Continue compression daily. Has alternatives on compression to decrease cost and updates them regularly.  3. Needs new brace.  Will send to orthotists to see if something can be devised that can support the left knee better.  4. Patient will follow up in 8 months or when needed. She will call when she needs new shoes.      Time spent with patient 15 minutes with greater than 50% time in consultation, education and coordination of care, excluding procedures.     ---------------------------------------------------------------------------------------------------------------------    Chief Complaint: left leg swelling and length discrepancy     History of Present Illness:   Arielle Stallworth returns to the [facility] for follow up of left leg swelling due to secondary lymphedema after treatment for uterine cancer complicated further by multiple surgeries in the right leg for infection in the right prosthesis of the knee which was eventually removed.  She was sent to therapy and compression was done daily.  Her ability to follow-up and be compliant with cares have been affected by major depressive episodes and anxiety she is regularly followed by psychiatry.  When I last saw her I recommended regular updating of her compression.  I showed her to get them much less expensively.  Her daughter was with her.  She is now here for follow up with her daughter. She has the new compression and it  works.  There has been no new numbness, tingling, weakness, masses, rashes, shortness of breath or chest pain. There have not been any new areas of ulceration. There has been no new fevers or pain. There are no new or changing skin lesions. Pain is rated a 0 on a 10 point scale. She has had no infections in the leg since I last saw her.  She would like to know if any different type of bracing could be used to stabilize the right knee area where the prosthesis and knee were removed.  She has not liked what she has had up until this time.      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)      Other lymphedema      Other specified erythematous condition(265.89)      Staph skin infection        Past Surgical History:   Procedure Laterality Date     BLADDER REPAIR       CARDIAC PACEMAKER PLACEMENT       CARPAL TUNNEL RELEASE       HYSTERECTOMY       KNEE HARDWARE REMOVAL Left      LEG SURGERY       muscle graft       PICC  11/27/2015          REPLACEMENT TOTAL KNEE BILATERAL  9/13/13    now knee prothesis has been removed, wears brace when up      SKIN GRAFT           Current Outpatient Medications:      acetaminophen (TYLENOL) 650 MG CR tablet, Take 650 mg by mouth every 8 (eight) hours as needed for pain., Disp: , Rfl:      aspirin 81 MG EC tablet, Take 81 mg by mouth daily., Disp: , Rfl:      bimatoprost (LUMIGAN) 0.01 % Drop, Administer 1 drop to both eyes bedtime. , Disp: , Rfl:      calcium carbonate-vitamin D3 (CALTRATE 600 PLUS D3) 600 mg(1,500mg) -400 unit per tablet, Take 1 tablet by mouth daily. , Disp: , Rfl:      cholecalciferol, vitamin D3, 1,000 unit tablet, Take  1,000 Units by mouth daily., Disp: , Rfl:      clotrimazole-betamethasone (LOTRISONE) cream, Apply topically., Disp: , Rfl:      docusate sodium (COLACE) 100 MG capsule, Take 100 mg by mouth every evening. , Disp: , Rfl:      doxycycline (MONODOX) 100 MG capsule, , Disp: , Rfl:      doxycycline (VIBRAMYCIN) 100 MG capsule, Take 1 capsule (100 mg total) by mouth 2 (two) times a day., Disp: 60 capsule, Rfl: 3     FLINTSTONES MULTIVITAMIN ORAL, Take by mouth., Disp: , Rfl:      hydrocortisone (PROCOTOCORT) 1 % crpe, , Disp: , Rfl:      lisinopril (PRINIVIL,ZESTRIL) 5 MG tablet, Take 5 mg by mouth daily. , Disp: , Rfl:      LORazepam (ATIVAN) 0.5 MG tablet, Take 1 tablet (0.5 mg total) by mouth 2 (two) times a day. Hold for sedation, Disp: 1 tablet, Rfl: 0     melatonin 3 mg Tab tablet, Take 5 mg by mouth at bedtime as needed.    , Disp: , Rfl:      mirtazapine (REMERON) 30 MG tablet, Take 30 mg by mouth at bedtime Half-tablet.   , Disp: , Rfl:      omeprazole (PRILOSEC) 40 MG capsule, Take 20 mg by mouth daily .   , Disp: , Rfl:      penicillin VK (PEN VK) 500 MG tablet, Take 500 mg by mouth 2 (two) times a day., Disp: , Rfl:      sodium chloride 1 gram tablet, Take 2 tablets (2 g total) by mouth 2 (two) times a day. (Patient taking differently: Take 3 g by mouth 2 (two) times a day .   ), Disp: 120 tablet, Rfl: 0     triamcinolone (KENALOG) 0.1 % lotion, Apply topically., Disp: , Rfl:      venlafaxine (EFFEXOR-XR) 37.5 MG 24 hr capsule, , Disp: , Rfl:     Allergies   Allergen Reactions     Sulfacetamide Sodium Shortness Of Breath     Hydrocodone Unknown and Hives     Hydrocodone-Acetaminophen Unknown     Iodinated Contrast- Oral And Iv Dye Unknown     Nitrofurantoin Monohyd/M-Cryst Other (See Comments) and Unknown     Trouble with lungs after taking       Adhesive Rash     Cephalexin Rash     Erythromycin Rash     Latex Rash     Sulfamethoxazole-Trimethoprim Rash     Hives, confirmed in office         Social History      Socioeconomic History     Marital status:      Spouse name: Not on file     Number of children: Not on file     Years of education: Not on file     Highest education level: Not on file   Occupational History     Not on file   Social Needs     Financial resource strain: Not on file     Food insecurity:     Worry: Not on file     Inability: Not on file     Transportation needs:     Medical: Not on file     Non-medical: Not on file   Tobacco Use     Smoking status: Former Smoker     Packs/day: 0.25     Years: 10.00     Pack years: 2.50     Types: Cigarettes     Last attempt to quit: 1986     Years since quittin.6     Smokeless tobacco: Never Used   Substance and Sexual Activity     Alcohol use: No     Drug use: No     Sexual activity: Not on file   Lifestyle     Physical activity:     Days per week: Not on file     Minutes per session: Not on file     Stress: Not on file   Relationships     Social connections:     Talks on phone: Not on file     Gets together: Not on file     Attends Yazidi service: Not on file     Active member of club or organization: Not on file     Attends meetings of clubs or organizations: Not on file     Relationship status: Not on file     Intimate partner violence:     Fear of current or ex partner: Not on file     Emotionally abused: Not on file     Physically abused: Not on file     Forced sexual activity: Not on file   Other Topics Concern     Not on file   Social History Narrative    .  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       Family History   Problem Relation Age of Onset     No Medical Problems Mother      Tuberculosis Father      Alcohol abuse Father      Alcohol abuse Sister      No Medical Problems Brother      Breast cancer Sister         Twice        Review of Systems:    Arielle Stallworth no new numbess, tingling or weakness, redness or rashes, fevers, new masses, abdominal bloating or discomfort, unexplained weight loss, increased pain, new ulcers, shortness of  breath and chest pain  Full 12 point review of systems was completed.    Imaging:    I personally reviewed the following imaging today and those on care everywhere, if indicated    XR  Knee Left 3 Views10/29/2018  [facility]  Result Impression   IMPRESSION:  Postoperative changes left knee resection arthroplasty with healed  distal femoral shaft fracture malunion. No major change since 9/29/16.    Result Narrative   EXAM:  DX KNEE LEFT 3 VIEWS   Other Result Information   Interface, [facility] Generic [id number] - 10/29/2018 11:38 AM CDT  EXAM:  DX KNEE LEFT 3 VIEWS      IMPRESSION:  Postoperative changes left knee resection arthroplasty with healed  distal femoral shaft fracture malunion. No major change since 9/29/16.       Labs:    I personally reviewed the following labs today and those on care everywhere, if indicated    Lab Results   Component Value Date    SEDRATE 30 (H) 09/06/2013         Lab Results   Component Value Date    CRP 0.2 09/18/2018           Lab Results   Component Value Date    CREATININE 0.60 05/08/2018      No results found for: HGBA1C        Lab Results   Component Value Date    BUN 13 05/08/2018              Lab Results   Component Value Date    ALBUMIN 3.6 05/08/2018       No results found for: [id number]    Lab Results   Component Value Date    TSH 1.13 04/20/2016     Lab Results   Component Value Date    WBC 6.4 05/08/2018    HGB 12.5 05/08/2018    HCT 36.1 05/08/2018    MCV 86 05/08/2018     05/12/2018     Nothing new to review    Physical Exam:  Vitals:    04/22/19 1427   BP: 132/72   Pulse: 84   Resp: 20   Temp: 98  F (36.7  C)      BMI 22.09 weight 128 pounds    Circumferential measures:    Vasc Edema 8/19/2015 10/12/2015 4/13/2016 10/18/2018 4/22/2019   Right just above MTP 21.6 21.1 20.6 22.2 21   Right Ankle 21.0 20.9 20.5 20.8 24.5   Right Widest Calf 36.0 34.4 36.4 33.3 30.5   Right Thigh Up 10cm 44.1 42.1 43 39.2 32   Left - just above MTP 22.0 20.7 21.4 21.7  21.2   Left Ankle 22.6 21.8 20.9 21.5 21.3   Left Widest Calf 28.0 28.5 28.5 34.5 27   Left Thigh Up 10cm 44.5 48.6 44.7 45.4 47     Circumferential measures looking good.    General:  87 y.o. female in no apparent distress.      Psych: Alert and oriented x 3.  Cooperative. Affect normal.    HEENT: Atraumatic and normocephalic.    Musculoskeletal:  Left ankle normal ROM.  Right ankle I can get to neutral.  There is no active joint synovitis, erythema, swelling or joint laxity.    Continues to be severe valgus deformity of the knees bilaterally with severe shortening of the left leg and distortion of the left knee.  This is stable and unchanged.     Neurological:  Sensation is intact to pin prick and light touch in both legs.        Vascular: Dorsalis pedis and posterior tibialis pulses are strong and equal bilaterally. There are no significant telangietasias, medial ankle venous flares, venous varicosities or spider veins.      Integumentary: Skin of the legs is uniformly warm and dry and and hyperpigmentated.  There is significant scarring and fibrosis along the left leg consistent with the multiple surgeries she's had.  There are no new ulcerations.  Nails are normal.     Thao Valle MD, ABWMS, FACCWS, [facility]  Medical Director Wound Care and Lymphedema  [facility]  [phone]

## 2021-05-28 NOTE — PATIENT INSTRUCTIONS - HE
"For knee brace:  [facility] (Please call to make an appointment)    [facility]  [address], [address]  [place], [place].  Phone: [phone]    [facility]  [address]  [place] [address] [phone]      Swelling in the legs can be caused by many reasons. No matter what the reason, treatment usually includes some type of compression. You should wear your compression socks as much as you can. Your compression should be put on first thing in the morning. Take the compression off at night. It is especially important to wear them with long periods of sitting/standing, long car rides or if you will be flying. Going without compression for even brief periods of time can be damaging to your legs and your health.  Compression socks should get replaced usually every 4-6 months. They do not need to be worn at night while in bed. If we have seen you in the last year, we can refill your sock prescription, otherwise your primary care provider is able to refill them for you. Call us with any problems or questions.   Compression Velcro may need to be replaced every 9-12 months.  Often the liners and socks need to be replaced every three or four months.  The neoprene velcro may last up to 2 years.  If the velcro becomes worn a tailor may be able to repair it for you inexpensively.    If you do a lot of standing, it is good to do calf raises to help keep the blood pumping. If you sit a lot at work, it is good to get up periodically to walk around. Elevation of the foot of your bed 4-6\" helps the blood return back to where it is needed.   Please call us if you have any questions [phone]    Thank you for choosing [facility].  "

## 2021-05-29 ENCOUNTER — RECORDS - HEALTHEAST (OUTPATIENT)
Dept: ADMINISTRATIVE | Facility: CLINIC | Age: 86
End: 2021-05-29

## 2021-05-31 ENCOUNTER — RECORDS - HEALTHEAST (OUTPATIENT)
Dept: ADMINISTRATIVE | Facility: CLINIC | Age: 86
End: 2021-05-31

## 2021-05-31 VITALS — WEIGHT: 145 LBS | HEIGHT: 65 IN | BODY MASS INDEX: 24.16 KG/M2

## 2021-06-01 ENCOUNTER — RECORDS - HEALTHEAST (OUTPATIENT)
Dept: ADMINISTRATIVE | Facility: CLINIC | Age: 86
End: 2021-06-01

## 2021-06-02 VITALS — WEIGHT: 139.6 LBS | HEIGHT: 64 IN | BODY MASS INDEX: 23.83 KG/M2

## 2021-06-02 VITALS — HEIGHT: 64 IN | BODY MASS INDEX: 21.97 KG/M2 | WEIGHT: 128.7 LBS

## 2021-06-02 VITALS — WEIGHT: 135 LBS | BODY MASS INDEX: 23.05 KG/M2 | HEIGHT: 64 IN

## 2021-06-02 VITALS — BODY MASS INDEX: 23.4 KG/M2 | HEIGHT: 66 IN

## 2021-06-02 NOTE — TELEPHONE ENCOUNTER
Patient needs refill on Penicillin and Doxycycline     She has enough left for this week     OKTLDM

## 2021-06-03 VITALS
WEIGHT: 139 LBS | TEMPERATURE: 97.9 F | BODY MASS INDEX: 23.13 KG/M2 | DIASTOLIC BLOOD PRESSURE: 68 MMHG | SYSTOLIC BLOOD PRESSURE: 118 MMHG | HEART RATE: 88 BPM

## 2021-06-03 VITALS
BODY MASS INDEX: 23.39 KG/M2 | DIASTOLIC BLOOD PRESSURE: 78 MMHG | SYSTOLIC BLOOD PRESSURE: 132 MMHG | WEIGHT: 137 LBS | HEIGHT: 64 IN | HEART RATE: 96 BPM

## 2021-06-03 NOTE — PATIENT INSTRUCTIONS - HE
I think your knee is doing is well at you can get and that the meds your are on are the best we can possibly do.    Therefore continue the present plan and don't rock the boat.      See you in a year.    Dao Redd

## 2021-06-03 NOTE — PROGRESS NOTES
Infectious Disease Progress Note  Assessment:    1. 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 She gets periodic breakthru cellulitis above/ below old knee joint. X 3 and group B strep bacteremia on at least 2 occasions. The GBS is resistant to azithromycin and clindamycin but has been sensitive to levaquin and penicillin. She gets encephalopathic on Levaquin.  Has no knee joint. Full explant. No further breakthru infections since on pen VK suppression.      During hospital stay in early Dec 2016, I successfully desensitized her to penicillin and she went home on Pen  mg qid x 5 days and then 250 mg bid long term in addition to the doxycycline 100 mg bid.  She has a past history of: allergic to keflex and penicillin (hives) and Sulfa, too. Gets encephalopathy with levaquin 500 mg/day.    Had a breakthru cellulitis - admitted 4/20/16 and treated with IV penicillin and discharged on PenVK 500 mg bid in view of failure of 250 mg bid to prevent infection.      2. Heartburn from doxy. Needs chronic doxy to prevent MRSA recurrence.   3. Multiple antibiotic allergies . Desensitized to pen VK  December, 2016.  Allergic to keflex.  4. Dementia and depression. Considering ECT.  5. 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      Recommendations:  1. Pen VK long term at 500 mg two times a day and   chronic doxycycline 100 mg bid. Given 3 mos supplye with refill x 1 year.  2. RTC 1 year.  Thanks for asking me to participate in this patients care.    Thanks,  Dao Redd MD    ______________________________________________________________________    Subjective: doing well. Tolerates meds. Has no knee joint in place. No redness or swelling.  Uses a walker and a brace.    Review of systems:  Patient denies fever, chills, cough,  sputum, vomiting, diarrhea, dysuria, urgency, flank pain, headache, stiff neck, rash, swollen glands or pain at IV sites.  SH/FH/ Habits/ PMH reviewed and unchanged.  Objective:   There were no vitals taken for this visit.      Exam:looks great. Thin.  Skin: no rashes or petechiae  CV: normal heart tones. No rub, murmur or S3. No JVD.  Had ppm on L  Respiratory: normal respiratory patter, no rales or rubs.  Abdmomen: soft, normal bowel sounds, non-tender, no masses or   organomegaly  Ext: no redness or swelling in L knee areas.  Neurologic: oriented x 3. Cranial nerves 2-12 intact. No focal weakness or sensory loss.                 Lab Results   Component Value Date    ALT 16 05/08/2018    AST 17 05/08/2018    ALKPHOS 118 05/08/2018    BILITOT 0.3 05/08/2018       Imaging:none new  Microbiology: none new    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 (H)     Hyposmolality and/or hyponatremia     Anemia, unspecified     Cardiac pacemaker in situ     Anxiety state, unspecified     Other lymphedema     Depressive disorder, not elsewhere classified     Other specified erythematous condition(805.89)     Edema     Advance directive discussed with patient     Absolute anemia     Anxiety     Arthritis     Affective disorder, major     Below normal amount of sodium in the blood     Eczema intertrigo     Infection and inflammatory reaction due to internal prosthetic device, implant, and graft     Lymphedema of left lower extremity     Blood infection (H)     Other reasons for seeking consultation     Left leg swelling     Scar condition and fibrosis of skin     Acquired leg length discrepancy     Ankle contracture, left     Ankle contracture, right     Gait abnormality     Acquired deformity of left lower extremity     Sepsis (H)     Malignant neoplasm of uterus, unspecified site (H)     Hyponatremia     Acute encephalopathy     Encephalopathy     Unstable  knee, left

## 2021-06-04 VITALS
WEIGHT: 132.3 LBS | SYSTOLIC BLOOD PRESSURE: 136 MMHG | HEART RATE: 88 BPM | BODY MASS INDEX: 22.59 KG/M2 | RESPIRATION RATE: 20 BRPM | HEIGHT: 64 IN | TEMPERATURE: 97.9 F | DIASTOLIC BLOOD PRESSURE: 80 MMHG

## 2021-06-05 VITALS
HEIGHT: 64 IN | SYSTOLIC BLOOD PRESSURE: 122 MMHG | RESPIRATION RATE: 16 BRPM | BODY MASS INDEX: 23.56 KG/M2 | WEIGHT: 138 LBS | HEART RATE: 86 BPM | OXYGEN SATURATION: 98 % | DIASTOLIC BLOOD PRESSURE: 74 MMHG

## 2021-06-05 VITALS
RESPIRATION RATE: 20 BRPM | HEART RATE: 84 BPM | SYSTOLIC BLOOD PRESSURE: 124 MMHG | DIASTOLIC BLOOD PRESSURE: 78 MMHG | HEIGHT: 64 IN | WEIGHT: 134 LBS | TEMPERATURE: 98.4 F | BODY MASS INDEX: 22.88 KG/M2

## 2021-06-05 NOTE — PATIENT INSTRUCTIONS - HE
"Continue current plan of care as discussed. Wheel chair weight 43.3 lbs, 48.7 lbs with shoes and brace together.      Swelling in the legs can be caused by many reasons. No matter what the reason, treatment usually includes some type of compression. You should wear your compression socks as much as you can. Your compression should be put on first thing in the morning. Take the compression off at night. It is especially important to wear them with long periods of sitting/standing, long car rides or if you will be flying. Going without compression for even brief periods of time can be damaging to your legs and your health.  Compression socks should get replaced usually every 4-6 months. They do not need to be worn at night while in bed. If we have seen you in the last year, we can refill your sock prescription, otherwise your primary care provider is able to refill them for you. Call us with any problems or questions.   Compression Velcro may need to be replaced every 9-12 months.  Often the liners and socks need to be replaced every three or four months.  The neoprene velcro may last up to 2 years.  If the velcro becomes worn a tailor may be able to repair it for you inexpensively.    If you do a lot of standing, it is good to do calf raises to help keep the blood pumping. If you sit a lot at work, it is good to get up periodically to walk around. Elevation of the foot of your bed 4-6\" helps the blood return back to where it is needed.   Please call us if you have any questions [phone]    Thank you for choosing [facility].    "

## 2021-06-05 NOTE — PROGRESS NOTES
Date of Service: 2020     Date last seen by Dr. Valle: 19    PCP: Constance Cohen MD    Impression:   1. Left leg swelling under good control  2. Secondary lymphedema   3. Scarring and fibrosis of skin left leg   4. S/p multiple surgeries and right knee prosthesis removal for infections   5. Severe left leg shortening with leg deformity with gait abnormality unstable left knee-much improved with new bracing system.  6. History of uterine cancer (in her 60's [facility])     Plan:   1. Questions were answered.   2. Continue compression daily. Has alternatives on compression to decrease cost and updates them regularly.  Other alternatives given.  She can try a knee-high on the left leg.  If this is not working she can go back to the thigh-high.  3. Continue present brace.  This is working much better for her.  She is pleased.  4. Patient will follow up in 1 year, or when needed.     Time spent with patient 15 minutes with greater than 50% time in consultation, education and coordination of care, excluding procedures.     ---------------------------------------------------------------------------------------------------------------------    Chief Complaint: left leg swelling and length discrepancy     History of Present Illness:   Arielle Stallworth returns to the [facility] for left leg swelling due to secondary lymphedema after treatment for uterine cancer complicated further by multiple surgeries in the right leg for infection in the right prosthesis of the knee which was eventually removed.  She was sent to therapy followed by compression daily.  Get less expensive compression comfortably.  She started wearing these and it worked much better.  She was to continue to wear her compression.  At her last visit her brace was not fitting well and supporting her knee well.  She has left leg shortening with significant instability in the knee.  A new brace was ordered.  She is  here for follow-up.  She last saw Dr. Redd in November 2019.  She will continue on the penicillin VK long-term at 500 mg twice a day and chronic doxycycline 100 mg twice a day.  She will be seeing him yearly.  She continues to see her psychiatrist yearly and her depression appears to be under good control.  She is here for evaluation of the new brace.  She reports she very much likes it.  It is much more comfortable.  She feels secure with it.  She is able to have the knee bent when she is sitting as it is hinged.  It locks on standing.  She feels her swelling is under excellent control.  There has been no new numbness, tingling, weakness, masses, rashes, shortness of breath or chest pain. There has been no new fevers or pain. There are no new or changing skin lesions. Pain is rated a 0 on a 10 point scale. She has had no recent signs of infection in the leg.        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)      Other lymphedema      Other specified erythematous condition(466.54)      Staph skin infection        Past Surgical History:   Procedure Laterality Date     BLADDER REPAIR       CARDIAC PACEMAKER PLACEMENT       CARPAL TUNNEL RELEASE       HYSTERECTOMY       KNEE HARDWARE REMOVAL Left      LEG SURGERY       muscle graft       PICC  11/27/2015          REPLACEMENT TOTAL KNEE BILATERAL  9/13/13    now knee prothesis has been removed, wears brace when up      SKIN GRAFT           Current Outpatient Medications:      acetaminophen (TYLENOL) 650 MG CR tablet, Take 650 mg by mouth every 8 (eight) hours as needed for pain., Disp: , Rfl:      aspirin 81 MG EC tablet,  Take 81 mg by mouth daily., Disp: , Rfl:      bimatoprost (LUMIGAN) 0.01 % Drop, Administer 1 drop to both eyes bedtime. , Disp: , Rfl:      cholecalciferol, vitamin D3, 1,000 unit tablet, Take 1,000 Units by mouth daily., Disp: , Rfl:      clotrimazole-betamethasone (LOTRISONE) cream, Apply topically., Disp: , Rfl:      docusate sodium (COLACE) 100 MG capsule, Take 100 mg by mouth every evening. , Disp: , Rfl:      [START ON 1/30/2020] doxycycline (MONODOX) 100 MG capsule, Take 1 capsule (100 mg total) by mouth 2 (two) times a day., Disp: 180 capsule, Rfl: 2     doxycycline hyclate 150 mg Tab, Take 150 mg by mouth 2 (two) times a day., Disp: , Rfl:      ketoconazole (NIZORAL) 2 % shampoo, , Disp: , Rfl:      lisinopril (PRINIVIL,ZESTRIL) 5 MG tablet, Take 5 mg by mouth daily. , Disp: , Rfl:      melatonin 3 mg Tab tablet, Take 5 mg by mouth at bedtime as needed.    , Disp: , Rfl:      mirtazapine (REMERON) 30 MG tablet, Take 30 mg by mouth at bedtime Half-tablet.   , Disp: , Rfl:      omeprazole (PRILOSEC) 40 MG capsule, Take 20 mg by mouth daily .   , Disp: , Rfl:      [START ON 1/30/2020] penicillin VK (PEN VK) 500 MG tablet, Take 1 tablet (500 mg total) by mouth 2 (two) times a day., Disp: 180 tablet, Rfl: 2     sodium chloride 1 gram tablet, Take 2 tablets (2 g total) by mouth 2 (two) times a day. (Patient taking differently: Take 3 g by mouth 2 (two) times a day .   ), Disp: 120 tablet, Rfl: 0     triamcinolone (KENALOG) 0.1 % lotion, Apply topically., Disp: , Rfl:      venlafaxine (EFFEXOR-XR) 37.5 MG 24 hr capsule, , Disp: , Rfl:     Allergies   Allergen Reactions     Sulfacetamide Sodium Shortness Of Breath     Hydrocodone Unknown and Hives     Hydrocodone-Acetaminophen Unknown     Iodinated Contrast Media Unknown     Nitrofurantoin Monohyd/M-Cryst Other (See Comments) and Unknown     Trouble with lungs after taking       Adhesive Rash     Cephalexin Rash     Erythromycin Rash     Latex Rash      Sulfamethoxazole-Trimethoprim Rash     Hives, confirmed in office         Social History     Socioeconomic History     Marital status:      Spouse name: Not on file     Number of children: Not on file     Years of education: Not on file     Highest education level: Not on file   Occupational History     Not on file   Social Needs     Financial resource strain: Not on file     Food insecurity:     Worry: Not on file     Inability: Not on file     Transportation needs:     Medical: Not on file     Non-medical: Not on file   Tobacco Use     Smoking status: Former Smoker     Packs/day: 0.25     Years: 10.00     Pack years: 2.50     Types: Cigarettes     Last attempt to quit: 1986     Years since quittin.4     Smokeless tobacco: Never Used   Substance and Sexual Activity     Alcohol use: No     Drug use: No     Sexual activity: Not on file   Lifestyle     Physical activity:     Days per week: Not on file     Minutes per session: Not on file     Stress: Not on file   Relationships     Social connections:     Talks on phone: Not on file     Gets together: Not on file     Attends Faith service: Not on file     Active member of club or organization: Not on file     Attends meetings of clubs or organizations: Not on file     Relationship status: Not on file     Intimate partner violence:     Fear of current or ex partner: Not on file     Emotionally abused: Not on file     Physically abused: Not on file     Forced sexual activity: Not on file   Other Topics Concern     Not on file   Social History Narrative    .  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       Family History   Problem Relation Age of Onset     No Medical Problems Mother      Tuberculosis Father      Alcohol abuse Father      Alcohol abuse Sister      No Medical Problems Brother      Breast cancer Sister         Twice        Review of Systems:    Arielle Stallworth no new numbess, tingling or weakness, redness or rashes, fevers, new masses, abdominal  bloating or discomfort, unexplained weight loss, increased pain, new ulcers, shortness of breath and chest pain  Full 12 point review of systems was completed.    Imaging:    I personally reviewed the following imaging results today and those on care everywhere, if indicated    XR  Knee Left 3 Views10/29/2018  [facility]  Result Impression   IMPRESSION:  Postoperative changes left knee resection arthroplasty with healed  distal femoral shaft fracture malunion. No major change since 9/29/16.    Result Narrative   EXAM:  DX KNEE LEFT 3 VIEWS   Other Result Information   Interface,  In [facility] Generic [id number] - 10/29/2018 11:38 AM CDT  EXAM:  DX KNEE LEFT 3 VIEWS      IMPRESSION:  Postoperative changes left knee resection arthroplasty with healed  distal femoral shaft fracture malunion. No major change since 9/29/16.       Labs:    I personally reviewed the following lab results today and those on care everywhere, if indicated    Lab Results   Component Value Date    SEDRATE 30 (H) 09/06/2013         Lab Results   Component Value Date    CRP 0.2 09/18/2018           Lab Results   Component Value Date    CREATININE 0.60 05/08/2018      No results found for: HGBA1C        Lab Results   Component Value Date    BUN 13 05/08/2018              Lab Results   Component Value Date    ALBUMIN 3.6 05/08/2018       No results found for: OYMPOPYX18YK    Lab Results   Component Value Date    TSH 1.13 04/20/2016     Lab Results   Component Value Date    WBC 6.4 05/08/2018    HGB 12.5 05/08/2018    HCT 36.1 05/08/2018    MCV 86 05/08/2018     05/12/2018     Nothing new to review    Physical Exam:  Vitals:    01/06/20 1405   BP: 136/80   Pulse: 88   Resp: 20   Temp: 97.9  F (36.6  C)      BMI 22.71 weight 132 (+3) pounds    Circumferential measures:    Vasc Edema 10/12/2015 4/13/2016 10/18/2018 4/22/2019 1/6/2020   Right just above MTP 21.1 20.6 22.2 21 21.2   Right Ankle 20.9 20.5 20.8 24.5 20.5   Right Widest Calf  34.4 36.4 33.3 30.5 32.6   Right Thigh Up 10cm 42.1 43 39.2 32 38.3   Left - just above MTP 20.7 21.4 21.7 21.2 21   Left Ankle 21.8 20.9 21.5 21.3 21.8   Left Widest Calf 28.5 28.5 34.5 27 29.7   Left Thigh Up 10cm 48.6 44.7 45.4 47 39.3     Circumferential measures doing very well.    General:  88 y.o. female in no apparent distress.      Psych: Alert and oriented x 3.  Cooperative. Affect normal.    HEENT: Atraumatic and normocephalic.    Musculoskeletal:  Left ankle normal ROM.  Right ankle I can get to neutral.  There is no active joint synovitis, erythema, swelling or joint laxity.    Continues to be severe valgus deformity of the knees bilaterally with severe shortening of the left leg and distortion of the left knee.  This is stable and unchanged.     Neurological:  Sensation is intact to pin prick and light touch in both legs.        Vascular: Dorsalis pedis and posterior tibialis pulses are strong and equal bilaterally. There are no significant telangietasias, medial ankle venous flares, venous varicosities or spider veins.      Integumentary: Skin of the legs is uniformly warm and dry and and hyperpigmentated.  There is significant scarring and fibrosis along the left leg consistent with the multiple surgeries she's had, but the fibrosis has decreased.  There are no new ulcerations.  Nails are normal.   The skin is very soft and swelling under excellent control.  There is no rubor, calor or pain to palpation.    Thao Valle MD, FABClare[facility] – [place], FAAPMR  Medical Director Wound Care and Lymphedema  [facility]  [phone]

## 2021-06-06 DIAGNOSIS — I10 BENIGN ESSENTIAL HYPERTENSION: ICD-10-CM

## 2021-06-07 NOTE — PROGRESS NOTES
TTM device check.  Please see link for full device report.  Patient was informed of results and next follow up via mail.

## 2021-06-07 NOTE — TELEPHONE ENCOUNTER
Routing refill request to provider for review/approval because:  Patient needs to be seen because:  Due for annual physical     Jason OLIVER Onofre

## 2021-06-09 ENCOUNTER — RECORDS - HEALTHEAST (OUTPATIENT)
Dept: ADMINISTRATIVE | Facility: CLINIC | Age: 86
End: 2021-06-09

## 2021-06-10 RX ORDER — LISINOPRIL 5 MG/1
TABLET ORAL
Qty: 90 TABLET | Refills: 0 | Status: SHIPPED | OUTPATIENT
Start: 2021-06-10 | End: 2021-09-10

## 2021-06-10 NOTE — PROGRESS NOTES
Progress Notes         Assessment:    1. 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 She gets periodic breakthru cellulitis above/ below old knee joint. x3 and group B strep bacteremia on at least 2 occasions. The GBS is resistant to azithromycin and clindamycin but has been sensitive to levaquin and penicillin. She gets encephalopathic on Levaquin.  Has no knee joint. Full explant.     During hospital stay in early Dec 2016, I successfully desensitized her to penicillin and she went home on Pen  mg qid x 5 days and then 250 mg bid long term in addition to the doxycycline 100 mg bid.  She has a past history of: allergic to keflex and penicillin (hives) and Sulfa, too. Gets encephalopathy with levaquin 500 mg/day.    Had a breakthru cellulitis - admitted 4/20/16 and treated with IV penicillin and discharged on PenVK 500 mg bid in view of failure of 250 mg bid to prevent infection.     2. Heartburn from doxy. Needs chronic doxy to prevent MRSA recurrence.   3. Multiple antibiotic allergies . Desensitized to pen VK last December. Allergic to keflex.  4. Dementia  5. SIADH  Recurrent group B strep bacteremia /cellulitis in LLE and infected L TKA has been a major issue x 4 years.    Recommendations:  1. Pen VK long term at 500 mg bid  4. Cont chronic doxycycline 100 mg bid.  5. Rx for SIADH one one gram NaCl per day  6. Remove penicillin allergy from med list.  Thanks for asking me to participate in this patients care.  Dao Redd     S:doing well. No knee issues. No infection x 11/2 year  Review of systems:  Patient denies fever, chills, cough, sputum, vomiting, diarrhea, dysuria, urgency, flank pain, headache, stiff neck, rash, swollen glands or pain at IV sites.     Social hx, family hx reviewed and unchanged.     Current Outpatient Prescriptions on File Prior to Visit   Medication Sig Dispense Refill     acetaminophen  (TYLENOL) 650 MG CR tablet Take 650 mg by mouth every 8 (eight) hours as needed for pain.       aspirin 81 MG EC tablet Take 81 mg by mouth daily.       bimatoprost (LUMIGAN) 0.01 % Drop Administer 1 drop to both eyes bedtime.        calcium carbonate-vitamin D3 (CALTRATE 600 PLUS D3) 600 mg(1,500mg) -400 unit per tablet Take 1 tablet by mouth daily.        clobetasol 0.05 % shampoo Apply 1 application topically as needed.        docusate sodium (COLACE) 100 MG capsule Take 100 mg by mouth every evening.        doxycycline (VIBRAMYCIN) 100 MG capsule TAKE ONE CAPSULE BY MOUTH TWICE A DAY 60 capsule 3     fluocinolone (SYNALAR) 0.025 % ointment Apply 1 application topically as needed.        LORazepam (ATIVAN) 0.5 MG tablet Take 1-2 tablets (0.5-1 mg total) by mouth every 6 (six) hours as needed for anxiety. 30 tablet 0     melatonin 3 mg Tab tablet Take 3 mg by mouth bedtime as needed.       mirtazapine (REMERON) 15 MG tablet Take 15 mg by mouth bedtime.       mirtazapine (REMERON) 7.5 MG tablet Take 30 mg by mouth.       oxyCODONE (ROXICODONE) 5 MG immediate release tablet Take 1 tablet (5 mg total) by mouth every 4 (four) hours as needed for pain. 30 tablet 0     pediatric multivitamin-iron (POLY-VI-SOL WITH IRON) chewable tablet Chew 1 tablet daily.       penicillin VK (PEN VK) 500 MG tablet TAKE ONE TABLET BY MOUTH TWICE A  tablet 1     ranitidine (ZANTAC) 150 MG tablet Take 150 mg by mouth daily as needed.        ranitidine (ZANTAC) 150 MG tablet Take 150 mg by mouth.       sodium chloride 1 gram tablet Take 1 tablet (1 g total) by mouth daily. 30 tablet 0     traMADol (ULTRAM) 50 mg tablet Take 1 tablet (50 mg total) by mouth every 6 (six) hours as needed for pain. 30 tablet 0     traZODone (DESYREL) 50 MG tablet Take 50 mg by mouth bedtime.        triamcinolone (KENALOG) 0.1 % cream Apply topically.       VANICREAM DIAPER RASH 1.1-40 % TOP OINT Apply 1 application topically daily as needed.         venlafaxine (EFFEXOR-XR) 150 MG 24 hr capsule Take 150 mg by mouth daily.       No current facility-administered medications on file prior to visit.      Exam: well-groomed.  L knee: stable. Prosthesis out.  Respiratory: normal respiratory patter, no rales or rubs.  CV: normal heart tones. No rub, murmur or S3. No JVD.  Abdmomen: soft, normal bowel sounds, non-tender, no masses or   organomegaly

## 2021-06-12 NOTE — TELEPHONE ENCOUNTER
Wellness Screening Tool  Symptom Screening:  Do you have one of the following NEW symptoms:    Fever (subjective or >100.0)?  No    A new cough?  No    Shortness of breath?  No     Chills? No     New loss of taste or smell? No     Generalized body aches? No     New persistent headache? No     New sore throat? No     Nausea, vomiting, or diarrhea?  No    Within the past 2 weeks, have you been exposed to someone with a known positive illness below:    COVID-19 (known or suspected)?  No    Chicken pox?  No    Mealses?  No    Pertussis?  No    Patient notified of visitor policy- They may have one person accompany them to their appointment, but they will need to wear a mask and will be screened upon arrival for symptoms: Yes  Pt informed to wear a mask: Yes  Pt notified if they develop any symptoms listed above, prior to their appointment, they are to call the clinic directly at [phone] for further instructions.  Yes  Patient's appointment status: Patient will be seen in clinic as scheduled on 10/22

## 2021-06-13 NOTE — TELEPHONE ENCOUNTER
I called the pt's daughter. She is wondering if there is anything that should be done for the pt (and the pt's ) for COVID. The daughter reports that the pt's  was ill, so she had both the pt and her  get tested, both of which were positive. The pt's sx are mild and include: tired, loss of appetite, however she denies cough, shortness of breath, or fever. The pt's o2 sats are about 96%.  The pt is concerned that her parent are not receiving any treatment. The pt's 's sx are worse, he is in bed and not eating. The daughter does not want them to end up in the hospital on vents or get worse, especially given their age of 89. The pt is calling for Dr Redd advisscott and tx guidance.

## 2021-06-13 NOTE — TELEPHONE ENCOUNTER
I put in a referral to Covid 19 monoclonal antibody approval at [place].      Dr Redd spoke to the pt daughter and informed of the above.

## 2021-06-13 NOTE — TELEPHONE ENCOUNTER
Ricardo has mild resp symptoms. Ill since 12/14. Both she and  tested positive for Covid on 12/14.    Pt is [place] and mildly mentally slowed at age 89. Quite frail.Has a PPM.    Her daughter, Beth is the person who called. She is an RN at [employer].    I think Ricardo is a good candidate for Covid monoclonal antibodyes.     is Beth Stallworth.     Beth's phone is [phone].     Order placed for Covid-19 infusion allocation/ randomization on 12/18.    Dao Redd

## 2021-06-13 NOTE — PROGRESS NOTES
Covid positive on 12/14 michelle no or minimal symptoms.  Interested in Bamlanivimab or Regernon monoclonal.  Patient is 89 years old and quite frail.    Will contact Ab Calzada.     is patient's daughter Beth, velma RN  Her number is [phone].    Dao Redd

## 2021-06-13 NOTE — TELEPHONE ENCOUNTER
Beth- daughter called.    Patient was Dx w/Covid on Monday and she would like to know what to do. She didn't get any real directions from the patients GP except to quarantine. She is looking for better directions and what to do since her mother is 89 years old.    Beth @ [phone]

## 2021-06-14 NOTE — PATIENT INSTRUCTIONS - HE
Short stretch to left leg to above knee and right leg below knee.  Can remove tomorrow and go back to sock.    - You will need to get an x-ray done before your follow up. This can be at any of the [facility] or at [facility] located downstairs on the first floor. No appointment is needed but depending on availability you may have to wait. You can call ahead if you'd like.     [facility]  [address],  [place] [address]   Monday through Friday 7 am to 10 pm, Saturday and Sunday 8 am to 4:40 pm   P: [phone]    [facility] [address] [place] [id number]   Monday through Friday 7 am to 7 pm/ P: [phone]    [facility] [address] [place] [address]   Monday through Friday 7 am to 5 pm, Saturday 9 am to 1 pm/ P: [phone]     [facility] [address] [place] [address]   Monday through Friday 7 am to 6:30pm/ P: [phone]      An X-ray uses a small amount of radiation to create images of your bones and internal organs. X-rays are most often used to detect bone or joint problems, or to check the heart and lungs (chest X-ray).   Let the technologist know   Tell the technologist if you:   Are or may be pregnant   Have had an X-ray of this part of your body before   Have metal in the part of your body being imaged      Before Your Test   You may be asked to remove your watch, jewelry, or garments with metal closures from the part of your body being imaged. These items can block part of the image.   You may be asked to put on a gown.   You may be asked about your overall health or any medications you take.  During Your Test   You will be asked to lie on a table, sit, or stand.   A lead apron may be draped over part of your body to shield it from the X-rays.   With an X-ray of your chest or abdomen, you will have to take a deep breath and hold it for a few seconds.   Each exam usually requires at least 2  X-rays. You will need to move your body before each new X-ray.  After Your Test   Your doctor will discuss the test results with you during a follow-up appointment or over the phone.     [id number] [employer]. [address] [place] [address], [place] [address]. All rights reserved. This information is not intended as a substitute for professional medical care. Always follow your healthcare professional's instructions.          For new compression and to check shoes and bracing:  [facility] (Please call to make an appointment)    [facility]  [facility], [address]  [place], [place].  Phone: [phone]        To order compression socks through [employer] or other online site:     Compression needed:  15-20 mmHg    Length:  knee high  Okay for thigh high if can fit on left    Toe Piece:  closed toe    Measures:        Inches (in)  Centimeters (cm)    Ankle  R - 8.9   L - 9.2  R - 22.5   L - 23.4   Calf (Widest Part)  R - 13.3   L - 12.4  R - 33.7   L - 31.5

## 2021-06-14 NOTE — PROGRESS NOTES
Compression Applied to Bilateral  Short Stretch. Right leg below knee and left leg above knee. Patient will remove tomorrow and go back to socks.    Reviewed instructions with patient and made follow up appointment.

## 2021-06-14 NOTE — PROGRESS NOTES
Infectious Disease Progress Note      Assessment:    1. 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 She gets periodic breakthru cellulitis above/ below old knee joint. x3 and group B strep bacteremia on at least 2 occasions. The GBS is resistant to azithromycin and clindamycin but has been sensitive to levaquin and penicillin. She gets encephalopathic on Levaquin.  Has no knee joint. Full explant.      During hospital stay in early Dec 2016, I successfully desensitized her to penicillin and she went home on Pen  mg qid x 5 days and then 250 mg bid long term in addition to the doxycycline 100 mg bid.  She has a past history of: allergic to keflex and penicillin (hives) and Sulfa, too. Gets encephalopathy with levaquin 500 mg/day.    Had a breakthru cellulitis - admitted 4/20/16 and treated with IV penicillin and discharged on PenVK 500 mg bid in view of failure of 250 mg bid to prevent infection.      2. Heartburn from doxy. Needs chronic doxy to prevent MRSA recurrence.   3. Multiple antibiotic allergies . Desensitized to pen VK  December, 2016.  Allergic to keflex.  4. Dementia  5. SIADH  Recurrent group B strep bacteremia /cellulitis in LLE and infected L TKA has been a major issue x 4 years.     Recommendations:  1. Pen VK long term at 500 mg bid  4. Cont chronic doxycycline 100 mg bid.  5. Rx for SIADH one one gram NaCl per day  6. Remove penicillin allergy from med list.  7. F/u CRP today  8. RTC 1 year.  Thanks for asking me to participate in this patients care.  Dao Redd        ______________________________________________________________________    Subjective: doing well. No knee issues. No infection x 11/2 year  Review of systems:  Patient denies fever, chills, cough, sputum, vomiting, diarrhea, dysuria, urgency, flank pain, headache, stiff neck, rash, swollen glands or pain at IV sites.      Social hx,  family hx reviewed and unchanged.      Review of systems:  Patient denies fever, chills, cough, sputum, vomiting, diarrhea, dysuria, urgency, flank pain, headache, stiff neck, rash, swollen glands or pain at IV sites.  SH/FH/ Habits/ PMH reviewed and unchanged.  Objective:   /70  Pulse 60  Temp 97.8  F (36.6  C)        Exam:looks well.  Skin: no rashes or petechiae  Nodes: no cervical, axillary or inguinal adenopathy  Respiratory: normal respiratory patter, no rales or rubs.  CV: normal heart tones. No rub, murmur or S3. No JVD.  Ext; no left knee joint. Calcifications of soft tissue. Not red or swolleen.  Neuro: pretty alert.              Lab Results   Component Value Date    ALT 16 04/20/2016    AST 16 04/20/2016    ALKPHOS 102 04/20/2016    BILITOT 0.5 04/20/2016     Lab Results   Component Value Date    CRP 0.5 11/29/2016       Imaging:none   Microbiology:none new    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     Hyposmolality and/or hyponatremia     Cellulitis and abscess of leg, except foot     Anemia, unspecified     Cardiac pacemaker in situ     Anxiety state, unspecified     Other lymphedema     Depressive disorder, not elsewhere classified     Other specified erythematous condition(225.89)     Edema     Advance directive discussed with patient     Absolute anemia     Anxiety     Arthritis     Recurrent cellulitis of lower leg     Affective disorder, major     Below normal amount of sodium in the blood     Eczema intertrigo     Infection and inflammatory reaction due to internal prosthetic device, implant, and graft     Lymphedema of left lower extremity     Blood infection     Other reasons for seeking consultation     Left leg swelling     Scar condition and fibrosis of skin     Acquired leg length discrepancy     Ankle contracture, left     Ankle contracture, right     Gait abnormality     Acquired leg deformity, left      Sepsis     Cellulitis     Uterine cancer     Hyponatremia     Acute encephalopathy

## 2021-06-14 NOTE — TELEPHONE ENCOUNTER
----- Message from Thao Valle MD sent at 1/11/2021 12:47 PM CST -----  Let family know the R has not greatly changed.  There are no signs of infection.

## 2021-06-14 NOTE — TELEPHONE ENCOUNTER
Pt called stating that she was hospitalized for COVID last month and since she has been home her bilateral leg swelling has increased. She stated that she didn't wear compression in the hospital. She said she has been wearing it since she has been home but her compression is getting old. She was unable to send photos. Pt scheduled for a f/u on 1/11.

## 2021-06-15 NOTE — TELEPHONE ENCOUNTER
I called Arielle on 2/4/2021 to discuss  insurance and how she needs to call them to check coverage. She knows this and did cancel her appnts with Lisbet for tomorrow. She has to organize her ride with a family member so will call back to reschedule. She knows to call  before our next appnt she schedules to determine coverage and set up a PA if needed since we are still in negotiations with .

## 2021-06-16 NOTE — TELEPHONE ENCOUNTER
Refill request received from [facility] for doxycycline. Pt need an in person appt for refills. Please schedule.

## 2021-06-16 NOTE — TELEPHONE ENCOUNTER
Arielle was called on 4/21/2021 to discuss rescheduling her cancelled appnt for compression stockings. She has not gotten any yet but is aware that her insurance doesn't typically cover for stockings. I informed her that  might but I have no way to call and check as we are negotiating contracts with them. She understands the lowest cost option to get compression stockings would be to go to [facility] in . She wrote all this information down and will stop by there next time she has an appnt in the building so she can get stockings.

## 2021-06-16 NOTE — TELEPHONE ENCOUNTER
Date: 5/11/2021 Status: [facility]   Time: 3:40 PM Length: 20   Visit Type: OFFICE VISIT [[id number]] Copay: $0.00   Provider: Dao Redd MD

## 2021-06-16 NOTE — TELEPHONE ENCOUNTER
4/20 - called x 1 - pt will have her daughter call us to schedule an appt as daughter is the transportation

## 2021-06-17 NOTE — PROGRESS NOTES
Infectious Disease Progress Note    Assessment:    1. 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 She gets periodic breakthru cellulitis above/ below old knee joint. X 3 and group B strep bacteremia on at least 2 occasions. The GBS is resistant to azithromycin and clindamycin but has been sensitive to levaquin and penicillin. She gets encephalopathic on Levaquin.  Has no knee joint. Full explant. No further breakthru infections since on pen VK suppression.      During hospital stay in early Dec 2016, I successfully desensitized her to penicillin and she went home on Pen  mg qid x 5 days and then 250 mg bid long term in addition to the doxycycline 100 mg bid.  She has a past history of: allergic to keflex and penicillin (hives) and Sulfa, too. Gets encephalopathy with levaquin 500 mg/day.      Had a breakthru cellulitis - admitted 4/20/16 and treated with IV penicillin and discharged on PenVK 500 mg bid in view of failure of 250 mg bid to prevent infection.      2. Heartburn from doxy. Needs chronic doxy to prevent MRSA recurrence.   3. Multiple antibiotic allergies . Desensitized to pen VK  December, 2016.  Allergic to keflex.  4. Dementia and depression. Considering ECT.  5. 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      Recommendations:  1. Pen VK long term at 500 mg two times a day and   chronic doxycycline 100 mg bid. Given 3 mos supply of each with refill x 1 year.  2. RTC 1 year with Dr. Quinn or associate  3. I didn't repeat CRP as it has been running totally normal for several years.  Thanks for asking me to participate in this patients care.     Thanks,  Dao Redd MD       ______________________________________________________________________    Subjective: doing well with L Leg brace and shoe lift from  Dr. 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   with dementia.    Review of systems:  Patient denies fever, chills, cough, sputum, vomiting, diarrhea, dysuria, urgency, flank pain, headache, stiff neck, rash, swollen glands or pain at IV sites.  SH/FH/ Habits/ PMH reviewed and unchanged.  Objective:   /62   Pulse 84   Temp 98.1  F (36.7  C)   Wt 135 lb (61.2 kg)   BMI 23.17 kg/m          Exam:mentally sharp.  Skin: wo rash.  Respiratory: normal respiratory patter, no rales or rubs.  Nodes: neg  LLE: femur edge palpable above knee. No patella. No redness, warmth or swelling.  Neurologic: oriented x 3. Cranial nerves 2-12 intact. No focal weakness or sensory loss.                 Lab Results   Component Value Date    ALT 16 05/08/2018    AST 17 05/08/2018    ALKPHOS 118 05/08/2018    BILITOT 0.3 05/08/2018     Lab Results   Component Value Date    CRP 0.2 09/18/2018         Imaging:none new   Microbiology: see above. Hx MRSA, MRSE, srep viridans, and GBS in knee.    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 (H)     Hyposmolality and/or hyponatremia     Anemia, unspecified     Cardiac pacemaker in situ     Anxiety state, unspecified     Other lymphedema     Depressive disorder, not elsewhere classified     Other specified erythematous condition(795.89)     Edema     Advance directive discussed with patient     Absolute anemia     Anxiety     Arthritis     Affective disorder, major     Below normal amount of sodium in the blood     Eczema intertrigo     Infection and inflammatory reaction due to internal prosthetic device, implant, and graft     Lymphedema of left lower extremity     Blood infection (H)     Other reasons for seeking consultation     Left leg swelling     Scar condition and fibrosis of skin     Acquired leg length discrepancy     Ankle contracture, left     Ankle  contracture, right     Gait abnormality     Acquired deformity of left lower extremity     Sepsis (H)     Malignant neoplasm of uterus, unspecified site (H)     Hyponatremia     Acute encephalopathy     Encephalopathy     Unstable knee, left     Complete atrioventricular block (H)     2019 novel coronavirus disease (COVID-19)     Dry eye     Elevated troponin     Essential hypertension     Gastroesophageal reflux disease     Generalized anxiety disorder     History of cardiac pacemaker in situ     Hypoalbuminemia     Hypocalcemia     Impetigo     Infected prosthetic knee joint (H)     Insomnia, unspecified     Malignant neoplasm of mouth (H)     Other constipation     Pain     Persons encountering health services in other specified circumstances     Severe episode of recurrent major depressive disorder, without psychotic features (H)     Transfusion of blood during current hospitalization     Syncope

## 2021-06-18 NOTE — PROGRESS NOTES
Called by patient to refill doxycycline before a trip this weekend.   First request sent to Dr. Redd, but he is away today.    I refilled doxy. Patient has f/up in September.

## 2021-06-19 NOTE — LETTER
Letter by Dao Redd MD at      Author: Dao Redd MD Service: -- Author Type: --    Filed:  Encounter Date: 11/5/2019 Status: Signed         November 5, 2019     Constance Cohen MD  [address] [facility] [address]    Patient: Arielle Stallworth   MR Number: [id number]   YOB: 1931   Date of Visit: 11/5/2019     Dear Dr. Vicki MD:    Thank you for referring Arielle Stallworth to me for evaluation. Below are the relevant portions of my assessment and plan of care.    If you have questions, please do not hesitate to call me. I look forward to following Arielle along with you.    Sincerely,        Dao Redd MD        CC  No Recipients    Progress Notes:

## 2021-06-20 NOTE — PROGRESS NOTES
"Infectious Disease Progress Note        Assessment:    1. 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 She gets periodic breakthru cellulitis above/ below old knee joint. x3 and group B strep bacteremia on at least 2 occasions. The GBS is resistant to azithromycin and clindamycin but has been sensitive to levaquin and penicillin. She gets encephalopathic on Levaquin.  Has no knee joint. Full explant.      During hospital stay in early Dec 2016, I successfully desensitized her to penicillin and she went home on Pen  mg qid x 5 days and then 250 mg bid long term in addition to the doxycycline 100 mg bid.  She has a past history of: allergic to keflex and penicillin (hives) and Sulfa, too. Gets encephalopathy with levaquin 500 mg/day.    Had a breakthru cellulitis - admitted 4/20/16 and treated with IV penicillin and discharged on PenVK 500 mg bid in view of failure of 250 mg bid to prevent infection.      2. Heartburn from doxy. Needs chronic doxy to prevent MRSA recurrence.   3. Multiple antibiotic allergies . Desensitized to pen VK  December, 2016.  Allergic to keflex.  4. Dementia and depression. Considering ECT.  5. 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      Recommendations:  1. Pen VK long term at 500 mg two times a day and  Also chronic doxycycline 100 mg bid.  2. Rx for SIADH one one gram NaCl per day  3. Remove penicillin allergy from med list.  4. F/u CRP today  5. RTC 1 year.  Thanks for asking me to participate in this patients care.    Dao Redd      ______________________________________________________________________    Subjective: doing ok. Some depression. Cant take fluoxetine due to SIADH  /90  Temp 98.3  F (36.8  C)  Resp 18  Ht 5' 6\" (1.676 m)    Review of systems:  Patient denies " fever, chills, cough, sputum, vomiting, diarrhea, dysuria, urgency, flank pain, headache, stiff neck, rash, swollen glands or pain at IV sites.  SH/FH/ Habits/ PMH reviewed and unchanged.  Objective:   There were no vitals taken for this visit.    Scheduled Meds:  Continuous Infusions:  PRN Meds:.    Exam:looks well.  CV: normal heart tones. No rub, murmur or S3. No JVD.  Respiratory: normal respiratory patter, no rales or rubs.  L LE:knee and pretibial area are cool and wo sign of infeciton.  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 (H)     Hyposmolality and/or hyponatremia     Cellulitis and abscess of leg, except foot     Anemia, unspecified     Cardiac pacemaker in situ     Anxiety state, unspecified     Other lymphedema     Depressive disorder, not elsewhere classified     Other specified erythematous condition(405.89)     Edema     Advance directive discussed with patient     Absolute anemia     Anxiety     Arthritis     Recurrent cellulitis of lower leg     Affective disorder, major     Below normal amount of sodium in the blood     Eczema intertrigo     Infection and inflammatory reaction due to internal prosthetic device, implant, and graft     Lymphedema of left lower extremity     Blood infection (H)     Other reasons for seeking consultation     Left leg swelling     Scar condition and fibrosis of skin     Acquired leg length discrepancy     Ankle contracture, left     Ankle contracture, right     Gait abnormality      Acquired leg deformity, left     Sepsis (H)     Cellulitis     Uterine cancer (H)     Hyponatremia     Acute encephalopathy     Encephalopathy

## 2021-06-21 NOTE — PROGRESS NOTES
Date of Service: 10/18/2018     Date last seen by Dr. Valle: 16    PCP: Constance Cohen MD    Impression:   1. Left leg swelling   2. Secondary lymphedema   3. Scarring and fibrosis of skin left leg   4. S/p multiple surgeries and right knee prosthesis removal for infections   5. Severe left leg shortening with leg deformity with gait abnormality  6. Right ankle contracture - stable  7. History of uterine cancer (in her 60's [facility])     Plan:   1. Questions were answered.   2. Continue compression daily. Reviewed in detail. Alternatives on compression given to decrease cost. She needs to update them regularly.  3. Patient will follow up in 6 months or when needed.     Time spent with patient 15 minutes with greater than 50% time in consultation, education and coordination of care, excluding procedures.     ---------------------------------------------------------------------------------------------------------------------    Chief Complaint: left leg swelling and length discrepancy     History of Present Illness:   Arielle Stallworth returns to the [facility] for follow up of left leg swelling due to secondary lymphedema after treatment for uterine cancer complicated further by multiple surgeries in the right leg for infection in the right process thesis of the knee which was eventually removed.  She was sent to therapy and compression was done daily.  I last saw her in 2016.  She was lost to follow-up.  It is noted from the chart she has had problems with major depressive episodes and anxiety.  She is being followed by psychiatry.  She is now here for follow up with her daughter.  She admits she has not changed her compression for over 2.5 years.  There has been no new numbness, tingling, weakness, masses, rashes, shortness of breath or chest pain. There have not been any new areas of ulceration. There has been no new fevers or pain. There are no new or  changing skin lesions. Pain is rated a 0 on a 10 point scale. She has had no infections in the leg since I last saw her.        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)      Other lymphedema      Other specified erythematous condition(695.89)      Staph skin infection        Past Surgical History:   Procedure Laterality Date     BLADDER REPAIR       CARDIAC PACEMAKER PLACEMENT       CARPAL TUNNEL RELEASE       HYSTERECTOMY       KNEE HARDWARE REMOVAL Left      LEG SURGERY       muscle graft       [facility]  11/27/2015          REPLACEMENT TOTAL KNEE BILATERAL  9/13/13    now knee prothesis has been removed, wears brace when up      SKIN GRAFT           Current Outpatient Prescriptions:      acetaminophen (TYLENOL) 650 MG CR tablet, Take 650 mg by mouth every 8 (eight) hours as needed for pain., Disp: , Rfl:      aspirin 81 MG EC tablet, Take 81 mg by mouth daily., Disp: , Rfl:      bimatoprost (LUMIGAN) 0.01 % Drop, Administer 1 drop to both eyes bedtime. , Disp: , Rfl:      calcium carbonate-vitamin D3 (CALTRATE 600 PLUS D3) 600 mg(1,500mg) -400 unit per tablet, Take 1 tablet by mouth daily. , Disp: , Rfl:      cholecalciferol, vitamin D3, 1,000 unit tablet, Take 1,000 Units by mouth daily., Disp: , Rfl:      clotrimazole-betamethasone (LOTRISONE) cream, Apply topically., Disp: , Rfl:      docusate sodium (COLACE) 100 MG capsule, Take 100 mg by mouth every evening. , Disp: , Rfl:      doxycycline (VIBRAMYCIN) 100 MG capsule, Take 1 capsule (100 mg total) by mouth 2 (two) times a day., Disp: 60 capsule, Rfl: 3     FLINTSTONES MULTIVITAMIN ORAL, Take by mouth., Disp: , Rfl:       lisinopril (PRINIVIL,ZESTRIL) 5 MG tablet, Take 5 mg by mouth daily. , Disp: , Rfl:      LORazepam (ATIVAN) 0.5 MG tablet, Take 1 tablet (0.5 mg total) by mouth 2 (two) times a day. Hold for sedation, Disp: 1 tablet, Rfl: 0     melatonin 3 mg Tab tablet, Take 3 mg by mouth bedtime as needed., Disp: , Rfl:      mirtazapine (REMERON) 30 MG tablet, Take 15 mg by mouth at bedtime. Half-tablet, Disp: , Rfl:      omeprazole (PRILOSEC) 40 MG capsule, Take 40 mg by mouth daily., Disp: , Rfl:      pediatric multivitamin-iron (POLY-VI-SOL WITH IRON) chewable tablet, Chew 1 tablet daily., Disp: , Rfl:      penicillin VK (PEN VK) 500 MG tablet, Take 500 mg by mouth 2 (two) times a day., Disp: , Rfl:      sodium chloride 1 gram tablet, Take 2 tablets (2 g total) by mouth 2 (two) times a day., Disp: 120 tablet, Rfl: 0     doxycycline (MONODOX) 100 MG capsule, , Disp: , Rfl:      traZODone (DESYREL) 50 MG tablet, Take 50 mg by mouth at bedtime as needed for sleep. , Disp: , Rfl:      venlafaxine (EFFEXOR-XR) 150 MG 24 hr capsule, Take 150 mg by mouth daily., Disp: , Rfl:     Allergies   Allergen Reactions     Sulfacetamide Sodium Shortness Of Breath     Hydrocodone Unknown and Hives     Hydrocodone-Acetaminophen Unknown     Iodinated Contrast- Oral And Iv Dye Unknown     Nitrofurantoin Monohyd/M-Cryst Other (See Comments) and Unknown     Trouble with lungs after taking       Adhesive Rash     Cephalexin Rash     Erythromycin Rash     Latex Rash     Sulfamethoxazole-Trimethoprim Rash     Hives, confirmed in office         Social History     Social History     Marital status:      Spouse name: N/A     Number of children: N/A     Years of education: N/A     Occupational History     Not on file.     Social History Main Topics     Smoking status: Former Smoker     Packs/day: 0.25     Years: 10.00     Types: Cigarettes     Quit date: 8/19/1986     Smokeless tobacco: Never Used     Alcohol use No     Drug use: No     Sexual  activity: Not on file     Other Topics Concern     Not on file     Social History Narrative    .  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       Family History   Problem Relation Age of Onset     No Medical Problems Mother      Tuberculosis Father      Alcohol abuse Father      Alcohol abuse Sister      No Medical Problems Brother      Breast cancer Sister      Twice        Review of Systems:    Arielle Stallworth no new numbess, tingling or weakness, redness or rashes, fevers, new masses, abdominal bloating or discomfort, unexplained weight loss, increased pain, new ulcers, shortness of breath and chest pain  Full 12 point review of systems was completed.    Imaging:    I personally reviewed the following imaging today and those on care everywhere, if indicated    No results found.    Labs:    I personally reviewed the following labs today and those on care everywhere, if indicated    Lab Results   Component Value Date    SEDRATE 30 (H) 09/06/2013         Lab Results   Component Value Date    CRP 0.2 09/18/2018           Lab Results   Component Value Date    CREATININE 0.60 05/08/2018      No results found for: HGBA1C        Lab Results   Component Value Date    BUN 13 05/08/2018              Lab Results   Component Value Date    ALBUMIN 3.6 05/08/2018       No results found for: [id number]    Lab Results   Component Value Date    TSH 1.13 04/20/2016     Lab Results   Component Value Date    WBC 6.4 05/08/2018    HGB 12.5 05/08/2018    HCT 36.1 05/08/2018    MCV 86 05/08/2018     05/12/2018         Physical Exam:  Vitals:    10/18/18 1129   BP: 130/70   Pulse: 84   Resp: 16   Temp: 98.6  F (37  C)        Circumferential measures:    Vasc Edema 7/6/2015 8/19/2015 10/12/2015 4/13/2016 10/18/2018   Right just above MTP 21.8 21.6 21.1 20.6 22.2   Right Ankle 22 21.0 20.9 20.5 20.8   Right Widest Calf 36.5 36.0 34.4 36.4 33.3   Right Thigh Up 10cm 44 44.1 42.1 43 39.2   Left - just above MTP 21.6 22.0 20.7 21.4 21.7   Left  Ankle 21.7 22.6 21.8 20.9 21.5   Left Widest Calf 32.5 28.0 28.5 28.5 34.5   Left Thigh Up 10cm 47.5 44.5 48.6 44.7 45.4     General:  87 y.o. female in no apparent distress.      Psych: Alert and oriented x 3.  Cooperative. Affect normal.    Abdominal: Normal bowel sounds without any pain, guarding,masses or rigidity. No inguinal lymphadenopathy palpated.    Musculoskeletal:  Left ankle normal ROM.  Right ankle I can get to neutral.  Plantar flexion is full. There is no active joint synovitis, erythema, swelling or joint laxity.    Continues to be severe valgus deformity of the knees bilaterally.  There is severe shortening of the left leg.  She has significant distortion of the left knee.     Neurological:  Sensation is intact to pin prick and light touch in both legs.        Vascular: Dorsalis pedis and posterior tibialis pulses are strong and equal bilaterally. There are no significant telangietasias, medial ankle venous flares, venous varicosities  and spider veins .      Integumentary: Skin of the legs is uniformly warm and dry and and hyperpigmentated.  There is significant scarring and fibrosis along the left leg consistent with the multiple surgeries she's had. Nails are normal.       Thao Valle MD, ABWMS, FACCWS, [facility]  Medical Director Wound Care and Lymphedema  [facility]  [phone]

## 2021-06-30 NOTE — PROGRESS NOTES
Progress Notes by Thao Valle MD at 2021  9:30 AM     Author: Thao Valle MD Service: -- Author Type: Physician    Filed: 2021 12:31 PM Encounter Date: 2021 Status: Signed    : Thao Valle MD (Physician)                     Date of Service: 2021     Date last seen by Dr. Valle: 2020    PCP: Constance Cohen MD    Impression:   1. Bilateral leg swelling-slightly worse  2. Secondary lymphedema   3. Scarring and fibrosis of skin left leg   4. S/p multiple surgeries and right knee prosthesis removal for infections   5. Severe left leg shortening with leg deformity with gait abnormality unstable left knee-increased hard mass on left anterior medial knee.    6. 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 Recent COVID-19 infection     Plan:   1. Questions were answered. Daughter present.  2. Continue compression daily. Has alternatives on compression to decrease cost and updates them regularly. More OTC options provided.   3. Will send to orthotist to check present brace for proper fitting.  This brace has helped her greatly in the past.  4. New compression written for.  5. With swelling slightly up will ask nursing to teach short stretch to above knee on left and below knee on right.  Tomorrow morning she should be able to return to her compression stockings.  6. The family is very concerned about the changes in the left knee.  XR was ordered.  7. Patient will follow up in 4-5 months, or when needed. The patient will watch for any increased redness, pain, temperature, drainage and/or any new ulcerations in the leg(s).  They are to call the clinic with any questions and/or concerns.    Time spent with patient and chart review in consultation, education and coordination of care:  45   minutes    ---------------------------------------------------------------------------------------------------------------------    Chief Complaint: left leg swelling and length discrepancy     History of Present Illness:   Arielle Stallworth returns to the [facility] for left leg swelling due to secondary lymphedema after treatment for uterine cancer complicated further by multiple surgeries in the right leg for infection in the right prosthesis of the knee which was eventually removed.  Treatment was therapy followed by compression daily with improvement.  She has left leg shortening with significant instability in the knee and a new braced was ordered with benefit.    She is here for follow-up.   Since I last saw her she was admitted for COVID-19 infection followed by colitis/diverticulitis.    She also had surgery at [place] for left submandibular squamous call ca 11/11/20. She continues to see Dr. Redd for suppressive antibiotics.   She feels her swelling has gone up in both legs slightly since she her function has decreased.  She is not ambulating much and starting to work with PT at home to increase her strength.  Her left knee deformity has worsened and her family is concerned her brace may not fit correctly. She needs new compression. There has been no new numbness, tingling, masses or rashes. There has been any new fevers or pain. There are no new or changing skin lesions.       Past Medical History:   Diagnosis Date   ? Anemia, unspecified    ? Anxiety state, unspecified    ? Cardiac pacemaker in situ    ? Cellulitis     recurrent 11/26/2015   ? Cellulitis and abscess of leg, except foot    ? Depressive disorder, not elsewhere classified    ? Edema    ? Hyposmolality and/or hyponatremia    ? Infection and inflammatory reaction due to internal joint prosthesis (H)    ? Other lymphedema    ? Other specified erythematous condition(165.68)    ? Staph skin infection         Past Surgical History:   Procedure Laterality Date   ? BLADDER REPAIR     ? CARDIAC PACEMAKER PLACEMENT     ? CARPAL TUNNEL RELEASE     ? HYSTERECTOMY     ? KNEE HARDWARE REMOVAL Left    ? LEG SURGERY     ? muscle graft     ? PICC  11/27/2015        ? REPLACEMENT TOTAL KNEE BILATERAL  9/13/13    now knee prothesis has been removed, wears brace when up    ? SKIN GRAFT           Current Outpatient Medications:   ?  acetaminophen (TYLENOL) 650 MG CR tablet, Take 650 mg by mouth every 8 (eight) hours as needed for pain., Disp: , Rfl:   ?  aspirin 81 MG EC tablet, Take 81 mg by mouth daily., Disp: , Rfl:   ?  bimatoprost (LUMIGAN) 0.01 % Drop, Administer 1 drop to both eyes bedtime. , Disp: , Rfl:   ?  cholecalciferol, vitamin D3, 5,000 unit Tab, Take 5,000 Units by mouth daily. , Disp: , Rfl:   ?  clotrimazole-betamethasone (LOTRISONE) cream, Apply topically., Disp: , Rfl:   ?  docusate sodium (COLACE) 100 MG capsule, Take 100 mg by mouth every evening. , Disp: , Rfl:   ?  doxycycline (MONODOX) 100 MG capsule, Take 1 capsule (100 mg total) by mouth 2 (two) times a day., Disp: 180 capsule, Rfl: 0  ?  doxycycline hyclate 150 mg Tab, Take 150 mg by mouth 2 (two) times a day., Disp: , Rfl:   ?  fluticasone propionate (FLONASE) 50 mcg/actuation nasal spray, 1 spray into each nostril daily., Disp: , Rfl:   ?  ketoconazole (NIZORAL) 2 % shampoo, , Disp: , Rfl:   ?  lisinopril (PRINIVIL,ZESTRIL) 5 MG tablet, Take 5 mg by mouth daily. , Disp: , Rfl:   ?  melatonin 3 mg Tab tablet, Take 5 mg by mouth at bedtime as needed.    , Disp: , Rfl:   ?  mirtazapine (REMERON) 30 MG tablet, Take 30 mg by mouth at bedtime Half-tablet.   , Disp: , Rfl:   ?  omeprazole (PRILOSEC) 40 MG capsule, Take 20 mg by mouth daily .   , Disp: , Rfl:   ?  penicillin VK (PEN VK) 500 MG tablet, Take 1 tablet (500 mg total) by mouth 2 (two) times a day., Disp: 180 tablet, Rfl: 0  ?  sodium chloride 1 gram tablet, Take 2 tablets (2 g total) by mouth 2  (two) times a day. (Patient taking differently: Take 3 g by mouth 2 (two) times a day .   ), Disp: 120 tablet, Rfl: 0  ?  triamcinolone (KENALOG) 0.1 % lotion, Apply 1 application topically as needed., Disp: , Rfl:   ?  venlafaxine (EFFEXOR-XR) 37.5 MG 24 hr capsule, , Disp: , Rfl:     Allergies   Allergen Reactions   ? Sulfacetamide Sodium Shortness Of Breath   ? Hydrocodone Unknown and Hives   ? Hydrocodone-Acetaminophen Unknown   ? Iodinated Contrast Media Unknown   ? Nitrofurantoin Monohyd/M-Cryst Other (See Comments) and Unknown     Trouble with lungs after taking     ? Adhesive Rash   ? Adhesive Tape-Silicones Rash     Other reaction(s): Other (see comments)  Redness. Ok with paper tape.   ? Cephalexin Rash   ? Erythromycin Rash   ? Gum Iaitkg-Ttzjgh-Tspu-Alcohol Rash   ? Latex Rash   ? Sulfamethoxazole-Trimethoprim Rash     Hives, confirmed in office         Social History     Socioeconomic History   ? Marital status:      Spouse name: Not on file   ? Number of children: Not on file   ? Years of education: Not on file   ? Highest education level: Not on file   Occupational History   ? Not on file   Social Needs   ? Financial resource strain: Not on file   ? Food insecurity     Worry: Not on file     Inability: Not on file   ? Transportation needs     Medical: Not on file     Non-medical: Not on file   Tobacco Use   ? Smoking status: Former Smoker     Packs/day: 0.25     Years: 10.00     Pack years: 2.50     Types: Cigarettes     Quit date: 1986     Years since quittin.4   ? Smokeless tobacco: Never Used   Substance and Sexual Activity   ? Alcohol use: No   ? Drug use: No   ? Sexual activity: Not on file   Lifestyle   ? Physical activity     Days per week: Not on file     Minutes per session: Not on file   ? Stress: Not on file   Relationships   ? Social connections     Talks on phone: Not on file     Gets together: Not on file     Attends Sabianist service: Not on file     Active member of  club or organization: Not on file     Attends meetings of clubs or organizations: Not on file     Relationship status: Not on file   ? Intimate partner violence     Fear of current or ex partner: Not on file     Emotionally abused: Not on file     Physically abused: Not on file     Forced sexual activity: Not on file   Other Topics Concern   ? Not on file   Social History Narrative    .  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       Family History   Problem Relation Age of Onset   ? No Medical Problems Mother    ? Tuberculosis Father    ? Alcohol abuse Father    ? Alcohol abuse Sister    ? No Medical Problems Brother    ? Breast cancer Sister         Twice        Review of Systems:    Arielle Stallworth no new numbess, tingling or weakness, redness or rashes, fevers, new masses, abdominal bloating or discomfort, unexplained weight loss, increased pain, new ulcers, shortness of breath and chest pain  Full 12 point review of systems was completed.    Imaging:    I personally reviewed the following imaging results today and those on care everywhere, if indicated    EXAM: CT ABDOMEN PELVIS WO  LOCATION: [facility] MEDICAL IMAGING  DATE/TIME: 12/28/2020 10:56 PM    INDICATION: Abdominal pain  COMPARISON: 06/10/2009  TECHNIQUE: CT scan of the abdomen and pelvis was performed without IV contrast. Multiplanar reformats were obtained. Dose reduction techniques were used.  CONTRAST: None.    FINDINGS:   LOWER CHEST: Linear fibrotic changes involving both lower lungs. No focal infiltrates or pleural fluid. Cardiac pacemaker wires. Minimal sliding esophageal hiatal hernia.    HEPATOBILIARY: Cholelithiasis with multiple small stones in the gallbladder lumen. No biliary dilatation. Noncontrast appearance to the liver unremarkable.    PANCREAS: Normal.    SPLEEN: Normal.    ADRENAL GLANDS: No significant nodules.    KIDNEYS/BLADDER: No urinary collecting system dilatation or calculi. No significant perinephric edema. Noncontrast appearance to the  kidneys unremarkable. Bladder unremarkable.    BOWEL: Segmental inflammatory change involving the mid descending colon extending through the proximal sigmoid to mid sigmoid colon. There are a few diverticula in this area but these appear normal in size. This is likely related to a segmental colitis given the extent rather than diverticulitis although that would be difficult to exclude given adjacent diverticula. No perforation or abscess formation. No inflammatory change involving the more proximal aspects of the colon or the small bowel.    LYMPH NODES: No lymphadenopathy.    VASCULATURE: Normal caliber abdominal aorta. Atherosclerotic vascular calcification.    PELVIC ORGANS: Hysterectomy. No overt adnexal abnormality.    MUSCULOSKELETAL: Moderate to marked degenerative changes involving the thoracic and lumbar spine with lumbar curvature convex to the left.    IMPRESSION:   1.  Segmental inflammatory change involving the mid descending colon extending through the mid sigmoid colon. Associated mild pericolonic inflammatory edema. There are a few diverticula in this area which appear normal in size. Therefore, this is likely related to an underlying colitis rather than diverticulitis. No perforation or abscess formation.    2.  No evidence for inflammatory change involving the remainder of the colon or small bowel.    3.  Atherosclerotic vascular calcification.    4.  Cholelithiasis.    5.  Pulmonary fibrotic changes.    PET CT Skull to Thigh FDG11/4/2020  [facility]  Result Impression   1. Focal FDG uptake in left mandible which could represent malignancy versus  infection/inflammation, especially given reported recent biopsy in this area. No  hypermetabolic cervical adenopathy or evidence of distant metastasis.    2. Enlarged and hypoattenuating thyroid with multiple nodules. This could be  better evaluated by thyroid ultrasound if indicated.   Other Result Information   This result has an attachment that is not  available.   Result Narrative   EXAM:  PET CT SKULL TO THIGH FDG    Serum glucose at time of F-18 FDG injection was 86 mg/dL. Patient followed  standard dietary/fasting requirements for this exam.    RADIOPHARMACEUTICAL/MEDS:   Route: intravenous  fludeoxyglucose F 18 injection USP (FDG F-18),10.5 millicurie    TECHNIQUE:  F-18 FDG PET/CT scan was performed from the orbits through the  thighs with CT fusion imaging for attenuation correction and anatomic  coregistration only, with imaging beginning at approximately 60 minutes after  radiotracer injection.     COMPARISON:  CT pelvis 05/24/2013 and CT abdomen/pelvis 02/20/2009    INDICATION:  Limited clinical information is available at the time of this  dictation. Reported outside history of left mandibular squamous cell carcinoma  with incisional biopsy 10/22/2020. Initial treatment strategy.     FINDINGS:  Focal FDG avidity in the body of the left posterior mandible with SUV  max 7.6. Absent left mandibular molars. No FDG avid cervical adenopathy. No  uptake suspicious for distant metastatic disease.    Mild patchy uptake within the right iliopsoas muscle is presumed physiologic.  Mild degenerative uptake in both hips and shoulders. Probably degenerative  uptake in the cervical spine.    Significant incidental findings on the low-dose noncontrast CT fusion images:  Left maxillary sinus polyp or mucus retention cyst. The thyroid is enlarged and  hypoattenuating with multiple nodules. Cardiac pacemaker. Aortic and coronary  artery calcification. Pulmonary fibrosis. Cholelithiasis. Colonic  diverticulosis. Right knee arthroplasty. Partially visualized  posttraumatic/postoperative changes in the left femur.       Labs:    I personally reviewed the following lab results today and those on care everywhere, if indicated    Lab Results   Component Value Date    SEDRATE 30 (H) 09/06/2013         Lab Results   Component Value Date    CRP 0.2 09/18/2018           Lab Results    Component Value Date    CREATININE 0.60 05/08/2018      No results found for: HGBA1C        Lab Results   Component Value Date    BUN 13 05/08/2018              Lab Results   Component Value Date    ALBUMIN 3.6 05/08/2018       No results found for: [id number]    Lab Results   Component Value Date    TSH 1.13 04/20/2016     Lab Results   Component Value Date    WBC 6.4 05/08/2018    HGB 12.5 05/08/2018    HCT 36.1 05/08/2018    MCV 86 05/08/2018     05/12/2018 1/8/21  Hgb 10.2  WBC 8.2  plts 393     1/1/21  BUN 14  Cr  0.55     12/28/20  Alb 4.0  LFT's WNL      Physical Exam:  Vitals:    01/11/21 0949   BP: 124/78   Pulse: 84   Resp: 20   Temp: 98.4  F (36.9  C)      BMI 22.71 weight 138 (+6) pounds    Circumferential measures:    Vasc Edema 4/13/2016 10/18/2018 4/22/2019 1/6/2020 1/11/2021   Right just above MTP 20.6 22.2 21 21.2 21.8   Right Ankle 20.5 20.8 24.5 20.5 22.5   Right Widest Calf 36.4 33.3 30.5 32.6 33.7   Right Thigh Up 10cm 43 39.2 32 38.3 39.5   Left - just above MTP 21.4 21.7 21.2 21 21.8   Left Ankle 20.9 21.5 21.3 21.8 23.4   Left Widest Calf 28.5 34.5 27 29.7 31.5   Left Thigh Up 10cm 44.7 45.4 47 39.3 45     Circumferential measures slightly up.    General:  89 y.o. female in no apparent distress.      Psych: Alert and oriented x 3.  Cooperative. Affect normal.    HEENT: Atraumatic and normocephalic.    Musculoskeletal:  Left ankle normal ROM.  Right ankle I can get to neutral without active joint synovitis, erythema, swelling or joint laxity.    Continues severe valgus deformity of the knees bilaterally with  shortening of the left leg and distortion of the left knee.  Left anterior medial knee deformity worse.  This is stable and unchanged.     Neurological:  Sensation is intact to pin prick and light touch in both legs except for slight decrease in sensation in left medial upper calf.      Vascular: Dorsalis pedis and posterior tibialis pulses are strong and equal bilaterally.  There are no significant telangietasias, medial ankle venous flares, venous varicosities or spider veins.      Integumentary: Skin of the legs is uniformly warm and dry and and hyperpigmentated.  There is significant scarring and fibrosis along the left leg consistent with the multiple surgeries she's had, but the fibrosis stable.  There are no new ulcerations.  Nails are normal.   The skin is very soft and swelling under excellent control.  There is no rubor, calor or pain to palpation.    Thao Valle MD, Founding Diplomate LINDSAY, FAAPMR  Medical Director Wound Care and Lymphedema  [facility]  [phone]

## 2021-07-03 NOTE — ADDENDUM NOTE
Addendum Note by Dao Valdivia MD at 9/18/2018  3:17 PM     Author: Dao Valdivia MD Service: -- Author Type: Physician    Filed: 9/18/2018  3:17 PM Encounter Date: 9/18/2018 Status: Signed    : Dao Valdivia MD (Physician)    Addended by: DAO VALDIVIA on: 9/18/2018 03:17 PM        Modules accepted: Orders

## 2021-07-04 NOTE — ADDENDUM NOTE
Addendum Note by Aileen Young RN at 4/21/2021 11:44 AM     Author: Aileen Young RN Service: -- Author Type: Registered Nurse    Filed: 4/21/2021 11:44 AM Encounter Date: 4/19/2021 Status: Signed    : Aileen Young RN (Registered Nurse)    Addended by: AILEEN YOUNG on: 4/21/2021 11:44 AM        Modules accepted: Orders         No

## 2021-08-05 ENCOUNTER — ANCILLARY PROCEDURE (OUTPATIENT)
Dept: CARDIOLOGY | Facility: CLINIC | Age: 86
End: 2021-08-05
Attending: INTERNAL MEDICINE
Payer: MEDICARE

## 2021-08-05 DIAGNOSIS — Z95.0 CARDIAC PACEMAKER IN SITU: ICD-10-CM

## 2021-08-05 LAB
MDC_IDC_LEAD_IMPLANT_DT: NORMAL
MDC_IDC_LEAD_IMPLANT_DT: NORMAL
MDC_IDC_LEAD_LOCATION: NORMAL
MDC_IDC_LEAD_LOCATION: NORMAL
MDC_IDC_LEAD_LOCATION_DETAIL_1: NORMAL
MDC_IDC_LEAD_LOCATION_DETAIL_1: NORMAL
MDC_IDC_LEAD_MFG: NORMAL
MDC_IDC_LEAD_MFG: NORMAL
MDC_IDC_LEAD_MODEL: 4261
MDC_IDC_LEAD_MODEL: NORMAL
MDC_IDC_LEAD_POLARITY_TYPE: NORMAL
MDC_IDC_LEAD_POLARITY_TYPE: NORMAL
MDC_IDC_LEAD_SERIAL: NORMAL
MDC_IDC_LEAD_SERIAL: NORMAL
MDC_IDC_MSMT_CAP_CHARGE_TYPE: NORMAL
MDC_IDC_PG_IMPLANT_DTM: NORMAL
MDC_IDC_PG_MFG: NORMAL
MDC_IDC_PG_MODEL: NORMAL
MDC_IDC_PG_SERIAL: NORMAL
MDC_IDC_PG_TYPE: NORMAL
MDC_IDC_SESS_CLINIC_NAME: NORMAL
MDC_IDC_SESS_DTM: NORMAL
MDC_IDC_SESS_TYPE: NORMAL
MDC_IDC_SET_ZONE_TYPE: NORMAL
MDC_IDC_SET_ZONE_VENDOR_TYPE: NORMAL

## 2021-08-05 PROCEDURE — 93293 PM PHONE R-STRIP DEVICE EVAL: CPT | Performed by: INTERNAL MEDICINE

## 2021-09-08 DIAGNOSIS — I10 BENIGN ESSENTIAL HYPERTENSION: ICD-10-CM

## 2021-09-10 RX ORDER — LISINOPRIL 5 MG/1
TABLET ORAL
Qty: 90 TABLET | Refills: 0 | Status: SHIPPED | OUTPATIENT
Start: 2021-09-10 | End: 2021-12-08

## 2021-09-10 NOTE — TELEPHONE ENCOUNTER
"Prescription approved per [facility] Refill Protocol.    Requested Prescriptions   Pending Prescriptions Disp Refills     lisinopril (ZESTRIL) 5 MG tablet [Pharmacy Med Name: LISINOPRIL 5MG TABS] 90 tablet 0     Sig: TAKE ONE TABLET BY MOUTH ONCE DAILY       ACE Inhibitors (Including Combos) Protocol Passed - 9/8/2021  5:02 AM        Passed - Blood pressure under 140/90 in past 12 months     BP Readings from Last 3 Encounters:   05/11/21 114/62   01/12/21 112/68   01/11/21 124/78                 Passed - Recent (12 mo) or future (30 days) visit within the authorizing provider's specialty     Patient has had an office visit with the authorizing provider or a provider within the authorizing providers department within the previous 12 mos or has a future within next 30 days. See \"Patient Info\" tab in inbasket, or \"Choose Columns\" in Meds & Orders section of the refill encounter.              Passed - Medication is active on med list        Passed - Patient is age 18 or older        Passed - No active pregnancy on record        Passed - Normal serum creatinine on file in past 12 months     Recent Labs   Lab Test 01/12/21  1431   CR 0.52       Ok to refill medication if creatinine is low          Passed - Normal serum potassium on file in past 12 months     Recent Labs   Lab Test 01/12/21  1431   POTASSIUM 4.6             Passed - No positive pregnancy test within past 12 months             "

## 2021-10-01 ENCOUNTER — TRANSFERRED RECORDS (OUTPATIENT)
Dept: HEALTH INFORMATION MANAGEMENT | Facility: CLINIC | Age: 86
End: 2021-10-01

## 2021-11-01 ENCOUNTER — TELEPHONE (OUTPATIENT)
Dept: INFECTIOUS DISEASES | Facility: CLINIC | Age: 86
End: 2021-11-01

## 2021-11-01 DIAGNOSIS — T84.59XS INFECTED PROSTHETIC KNEE JOINT, SEQUELA: ICD-10-CM

## 2021-11-01 DIAGNOSIS — Z96.659 INFECTED PROSTHETIC KNEE JOINT, SEQUELA: ICD-10-CM

## 2021-11-01 DIAGNOSIS — L03.116 LEFT LEG CELLULITIS: ICD-10-CM

## 2021-11-01 RX ORDER — PENICILLIN V POTASSIUM 500 MG/1
500 TABLET, FILM COATED ORAL 2 TIMES DAILY
Qty: 180 TABLET | Refills: 0 | Status: SHIPPED | OUTPATIENT
Start: 2021-11-01 | End: 2022-01-24

## 2021-11-01 RX ORDER — DOXYCYCLINE 100 MG/1
100 CAPSULE ORAL 2 TIMES DAILY
Qty: 180 CAPSULE | Refills: 0 | Status: SHIPPED | OUTPATIENT
Start: 2021-11-01 | End: 2022-01-24

## 2021-11-01 NOTE — TELEPHONE ENCOUNTER
ID TEAM    Previous pt of dr fink. Has f/up with dr ward in feb 2022.    Refill:  -  Doxycycline Hyclate 150 MG TABS  -   penicillin V (VEETID) 500 MG tablet    please send to: [facility] [place], [place] - [address]

## 2021-12-07 DIAGNOSIS — I10 BENIGN ESSENTIAL HYPERTENSION: ICD-10-CM

## 2021-12-08 RX ORDER — LISINOPRIL 5 MG/1
TABLET ORAL
Qty: 90 TABLET | Refills: 0 | Status: SHIPPED | OUTPATIENT
Start: 2021-12-08 | End: 2022-03-04

## 2021-12-08 NOTE — TELEPHONE ENCOUNTER
Routing refill request to provider for review/approval because:  Patient needs to be seen because:  Due for annual exam    Jason Onofre RN

## 2022-01-24 DIAGNOSIS — Z96.659 INFECTED PROSTHETIC KNEE JOINT, SEQUELA: ICD-10-CM

## 2022-01-24 DIAGNOSIS — T84.59XS INFECTED PROSTHETIC KNEE JOINT, SEQUELA: ICD-10-CM

## 2022-01-24 RX ORDER — PENICILLIN V POTASSIUM 500 MG/1
500 TABLET, FILM COATED ORAL 2 TIMES DAILY
Qty: 180 TABLET | Refills: 0 | Status: SHIPPED | OUTPATIENT
Start: 2022-01-24 | End: 2022-04-26

## 2022-01-24 RX ORDER — DOXYCYCLINE 100 MG/1
100 CAPSULE ORAL 2 TIMES DAILY
Qty: 180 CAPSULE | Refills: 0 | Status: SHIPPED | OUTPATIENT
Start: 2022-01-24 | End: 2022-04-26

## 2022-02-02 ENCOUNTER — ANCILLARY PROCEDURE (OUTPATIENT)
Dept: CARDIOLOGY | Facility: CLINIC | Age: 87
End: 2022-02-02
Payer: MEDICARE

## 2022-02-02 DIAGNOSIS — Z95.0 PACEMAKER: ICD-10-CM

## 2022-02-02 DIAGNOSIS — I44.2 THIRD DEGREE HEART BLOCK (H): Primary | ICD-10-CM

## 2022-02-02 PROCEDURE — 93280 PM DEVICE PROGR EVAL DUAL: CPT | Performed by: INTERNAL MEDICINE

## 2022-02-03 LAB
MDC_IDC_LEAD_IMPLANT_DT: NORMAL
MDC_IDC_LEAD_IMPLANT_DT: NORMAL
MDC_IDC_LEAD_LOCATION: NORMAL
MDC_IDC_LEAD_LOCATION: NORMAL
MDC_IDC_LEAD_LOCATION_DETAIL_1: NORMAL
MDC_IDC_LEAD_LOCATION_DETAIL_1: NORMAL
MDC_IDC_LEAD_MFG: NORMAL
MDC_IDC_LEAD_MFG: NORMAL
MDC_IDC_LEAD_MODEL: 4261
MDC_IDC_LEAD_MODEL: NORMAL
MDC_IDC_LEAD_POLARITY_TYPE: NORMAL
MDC_IDC_LEAD_POLARITY_TYPE: NORMAL
MDC_IDC_LEAD_SERIAL: NORMAL
MDC_IDC_LEAD_SERIAL: NORMAL
MDC_IDC_MSMT_BATTERY_STATUS: NORMAL
MDC_IDC_MSMT_LEADCHNL_RA_IMPEDANCE_VALUE: 580 OHM
MDC_IDC_MSMT_LEADCHNL_RA_PACING_THRESHOLD_AMPLITUDE: 1.8 V
MDC_IDC_MSMT_LEADCHNL_RA_PACING_THRESHOLD_PULSEWIDTH: 0.8 MS
MDC_IDC_MSMT_LEADCHNL_RA_SENSING_INTR_AMPL: 1 MV
MDC_IDC_MSMT_LEADCHNL_RV_IMPEDANCE_VALUE: 740 OHM
MDC_IDC_MSMT_LEADCHNL_RV_PACING_THRESHOLD_AMPLITUDE: 1.2 V
MDC_IDC_MSMT_LEADCHNL_RV_PACING_THRESHOLD_PULSEWIDTH: 0.8 MS
MDC_IDC_MSMT_LEADCHNL_RV_SENSING_INTR_AMPL: 8.6 MV
MDC_IDC_PG_IMPLANT_DTM: NORMAL
MDC_IDC_PG_MFG: NORMAL
MDC_IDC_PG_MODEL: NORMAL
MDC_IDC_PG_SERIAL: NORMAL
MDC_IDC_PG_TYPE: NORMAL
MDC_IDC_SESS_CLINIC_NAME: NORMAL
MDC_IDC_SESS_DTM: NORMAL
MDC_IDC_SESS_TYPE: NORMAL
MDC_IDC_SET_BRADY_AT_MODE_SWITCH_MODE: NORMAL
MDC_IDC_SET_BRADY_AT_MODE_SWITCH_RATE: 170 {BEATS}/MIN
MDC_IDC_SET_BRADY_HYSTRATE: NORMAL
MDC_IDC_SET_BRADY_LOWRATE: 60 {BEATS}/MIN
MDC_IDC_SET_BRADY_MAX_SENSOR_RATE: 130 {BEATS}/MIN
MDC_IDC_SET_BRADY_MAX_TRACKING_RATE: 120 {BEATS}/MIN
MDC_IDC_SET_BRADY_MODE: NORMAL
MDC_IDC_SET_BRADY_PAV_DELAY_HIGH: 250 MS
MDC_IDC_SET_BRADY_PAV_DELAY_LOW: 340 MS
MDC_IDC_SET_BRADY_SAV_DELAY_HIGH: 250 MS
MDC_IDC_SET_BRADY_SAV_DELAY_LOW: 340 MS
MDC_IDC_SET_LEADCHNL_RA_PACING_AMPLITUDE: 3 V
MDC_IDC_SET_LEADCHNL_RA_PACING_CAPTURE_MODE: NORMAL
MDC_IDC_SET_LEADCHNL_RA_PACING_POLARITY: NORMAL
MDC_IDC_SET_LEADCHNL_RA_PACING_PULSEWIDTH: 0.8 MS
MDC_IDC_SET_LEADCHNL_RA_SENSING_ADAPTATION_MODE: NORMAL
MDC_IDC_SET_LEADCHNL_RA_SENSING_POLARITY: NORMAL
MDC_IDC_SET_LEADCHNL_RA_SENSING_SENSITIVITY: 0.5 MV
MDC_IDC_SET_LEADCHNL_RV_PACING_AMPLITUDE: 2.8 V
MDC_IDC_SET_LEADCHNL_RV_PACING_CAPTURE_MODE: NORMAL
MDC_IDC_SET_LEADCHNL_RV_PACING_POLARITY: NORMAL
MDC_IDC_SET_LEADCHNL_RV_PACING_PULSEWIDTH: 0.8 MS
MDC_IDC_SET_LEADCHNL_RV_SENSING_ADAPTATION_MODE: NORMAL
MDC_IDC_SET_LEADCHNL_RV_SENSING_POLARITY: NORMAL
MDC_IDC_SET_LEADCHNL_RV_SENSING_SENSITIVITY: 2 MV
MDC_IDC_STAT_AT_DTM_END: NORMAL
MDC_IDC_STAT_AT_DTM_START: NORMAL
MDC_IDC_STAT_AT_MODE_SW_COUNT: 22
MDC_IDC_STAT_AT_MODE_SW_MAX_DURATION: 30 S
MDC_IDC_STAT_BRADY_DTM_END: NORMAL
MDC_IDC_STAT_BRADY_DTM_START: NORMAL
MDC_IDC_STAT_BRADY_RA_PERCENT_PACED: 1 %
MDC_IDC_STAT_BRADY_RV_PERCENT_PACED: 2 %
MDC_IDC_STAT_EPISODE_RECENT_COUNT: 22
MDC_IDC_STAT_EPISODE_RECENT_COUNT_DTM_END: NORMAL
MDC_IDC_STAT_EPISODE_RECENT_COUNT_DTM_START: NORMAL
MDC_IDC_STAT_EPISODE_TYPE: NORMAL

## 2022-02-15 ENCOUNTER — OFFICE VISIT (OUTPATIENT)
Dept: INFECTIOUS DISEASES | Facility: CLINIC | Age: 87
End: 2022-02-15
Payer: MEDICARE

## 2022-02-15 VITALS
WEIGHT: 133 LBS | BODY MASS INDEX: 22.83 KG/M2 | TEMPERATURE: 97.9 F | HEART RATE: 84 BPM | SYSTOLIC BLOOD PRESSURE: 124 MMHG | DIASTOLIC BLOOD PRESSURE: 66 MMHG

## 2022-02-15 DIAGNOSIS — Z96.659 INFECTED PROSTHETIC KNEE JOINT, SEQUELA: Primary | ICD-10-CM

## 2022-02-15 DIAGNOSIS — T84.59XS INFECTED PROSTHETIC KNEE JOINT, SEQUELA: Primary | ICD-10-CM

## 2022-02-15 PROCEDURE — 99213 OFFICE O/P EST LOW 20 MIN: CPT | Performed by: INTERNAL MEDICINE

## 2022-02-15 NOTE — PROGRESS NOTES
Infectious Disease Progress Note  02/15/2022    Assessment:    1. 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 She gets periodic breakthru cellulitis above/ below old knee joint. X 3 and group B strep bacteremia on at least 2 occasions. The GBS is resistant to azithromycin and clindamycin but has been sensitive to levaquin and penicillin. She gets encephalopathic on Levaquin.  Has no knee joint. Full explant. No further breakthru infections since on pen VK suppression.      During hospital stay in early Dec 2016, she was desensitized her to penicillin and she went home on Pen  mg qid x 5 days and then 250 mg bid long term in addition to the doxycycline 100 mg bid.  She has a past history of: allergic to keflex and penicillin (hives) and Sulfa, too. Gets encephalopathy with levaquin 500 mg/day.      Had a breakthru cellulitis - admitted 4/20/16 and treated with IV penicillin and discharged on PenVK 500 mg bid in view of failure of 250 mg bid to prevent infection.    2. Heartburn from doxy. Needs chronic doxy to prevent MRSA recurrence.   3. Multiple antibiotic allergies . Desensitized to pen VK  December, 2016, tolerating the PCN.  Allergic to keflex.  4. Dementia and depression. Considering ECT.  5. 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       Recommendations:  1. Continue Pen VK long term at 500 mg two times a day and   chronic doxycycline 100 mg bid. Given 3 mos supply of each with refill x 1 year. Seems to be tolerating this   2. RTC 1 year  3. CRP have been normal so no indication to recheck. Will get creatinine checked at next PCP visit.   Thanks for asking me to participate in this patients care.     Thanks,  Chastity Macedo  MD       ______________________________________________________________________    Subjective:  Patient of Dr Redd. Here today doing well. No issues with the leg. Tolerating the antibiotics. Some faint red rash on left arm, there for a bit not too itchy also some cradle cap on scalp been there years. Here with daughter.     Review of systems:  Patient denies fever, chills, cough, sputum, vomiting, diarrhea, dysuria, urgency, flank pain, headache, stiff neck, rash, swollen glands or pain at IV sites.  SH/FH/ Habits/ PMH reviewed and unchanged.  Objective:   /62   Pulse 84   Temp 98.1  F (36.7  C)   Wt 135 lb (61.2 kg)   BMI 23.17 kg/m          Exam:mentally sharp.  Skin: faint rash left arm.   Respiratory: normal respiratory pattern, no rales or rubs.  Nodes: neg  LLE: femur edge palpable above knee. No patella. No redness, warmth or swelling.  Neurologic: oriented x 3. Cranial nerves 2-12 intact. No focal weakness or sensory loss.                 Lab Results   Component Value Date    ALT 16 05/08/2018    AST 17 05/08/2018    ALKPHOS 118 05/08/2018    BILITOT 0.3 05/08/2018     Lab Results   Component Value Date    CRP 0.2 09/18/2018         Imaging:none new   Microbiology: see above. Hx MRSA, MRSE, srep viridans, and GBS in knee.    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 (H)     Hyposmolality and/or hyponatremia     Anemia, unspecified     Cardiac pacemaker in situ     Anxiety state, unspecified     Other lymphedema     Depressive disorder, not elsewhere classified     Other specified erythematous condition(425.92)     Edema     Advance directive discussed with patient     Absolute anemia     Anxiety     Arthritis     Affective disorder, major     Below normal amount of sodium in the blood     Eczema intertrigo     Infection and inflammatory reaction due to internal prosthetic device, implant, and graft     Lymphedema of left  lower extremity     Blood infection (H)     Other reasons for seeking consultation     Left leg swelling     Scar condition and fibrosis of skin     Acquired leg length discrepancy     Ankle contracture, left     Ankle contracture, right     Gait abnormality     Acquired deformity of left lower extremity     Sepsis (H)     Malignant neoplasm of uterus, unspecified site (H)     Hyponatremia     Acute encephalopathy     Encephalopathy     Unstable knee, left     Complete atrioventricular block (H)     2019 novel coronavirus disease (COVID-19)     Dry eye     Elevated troponin     Essential hypertension     Gastroesophageal reflux disease     Generalized anxiety disorder     History of cardiac pacemaker in situ     Hypoalbuminemia     Hypocalcemia     Impetigo     Infected prosthetic knee joint (H)     Insomnia, unspecified     Malignant neoplasm of mouth (H)     Other constipation     Pain     Persons encountering health services in other specified circumstances     Severe episode of recurrent major depressive disorder, without psychotic features (H)     Transfusion of blood during current hospitalization     Syncope

## 2022-03-02 ENCOUNTER — OFFICE VISIT (OUTPATIENT)
Dept: FAMILY MEDICINE | Facility: CLINIC | Age: 87
End: 2022-03-02
Payer: MEDICARE

## 2022-03-02 VITALS
OXYGEN SATURATION: 99 % | WEIGHT: 133 LBS | HEIGHT: 64 IN | HEART RATE: 83 BPM | DIASTOLIC BLOOD PRESSURE: 72 MMHG | SYSTOLIC BLOOD PRESSURE: 110 MMHG | TEMPERATURE: 99.3 F | BODY MASS INDEX: 22.71 KG/M2

## 2022-03-02 DIAGNOSIS — Z96.659 INFECTED PROSTHETIC KNEE JOINT, SEQUELA: ICD-10-CM

## 2022-03-02 DIAGNOSIS — L20.82 FLEXURAL ECZEMA: ICD-10-CM

## 2022-03-02 DIAGNOSIS — E53.8 VITAMIN B 12 DEFICIENCY: Primary | ICD-10-CM

## 2022-03-02 DIAGNOSIS — E55.9 VITAMIN D DEFICIENCY: ICD-10-CM

## 2022-03-02 DIAGNOSIS — Z86.2 HISTORY OF ANEMIA: ICD-10-CM

## 2022-03-02 DIAGNOSIS — I10 BENIGN ESSENTIAL HYPERTENSION: ICD-10-CM

## 2022-03-02 DIAGNOSIS — T84.59XS INFECTED PROSTHETIC KNEE JOINT, SEQUELA: ICD-10-CM

## 2022-03-02 LAB
ANION GAP SERPL CALCULATED.3IONS-SCNC: 7 MMOL/L (ref 3–14)
BUN SERPL-MCNC: 25 MG/DL (ref 7–30)
CALCIUM SERPL-MCNC: 9.1 MG/DL (ref 8.5–10.1)
CHLORIDE BLD-SCNC: 103 MMOL/L (ref 94–109)
CO2 SERPL-SCNC: 23 MMOL/L (ref 20–32)
CREAT SERPL-MCNC: 0.58 MG/DL (ref 0.52–1.04)
ERYTHROCYTE [DISTWIDTH] IN BLOOD BY AUTOMATED COUNT: 12.4 % (ref 10–15)
FERRITIN SERPL-MCNC: 87 NG/ML (ref 8–252)
GFR SERPL CREATININE-BSD FRML MDRD: 85 ML/MIN/1.73M2
GLUCOSE BLD-MCNC: 126 MG/DL (ref 70–99)
HCT VFR BLD AUTO: 36 % (ref 35–47)
HGB BLD-MCNC: 11.4 G/DL (ref 11.7–15.7)
IRON SATN MFR SERPL: 17 % (ref 15–46)
IRON SERPL-MCNC: 54 UG/DL (ref 35–180)
MCH RBC QN AUTO: 29.8 PG (ref 26.5–33)
MCHC RBC AUTO-ENTMCNC: 31.7 G/DL (ref 31.5–36.5)
MCV RBC AUTO: 94 FL (ref 78–100)
PLATELET # BLD AUTO: 252 10E3/UL (ref 150–450)
POTASSIUM BLD-SCNC: 4.9 MMOL/L (ref 3.4–5.3)
RBC # BLD AUTO: 3.82 10E6/UL (ref 3.8–5.2)
SODIUM SERPL-SCNC: 133 MMOL/L (ref 133–144)
TIBC SERPL-MCNC: 326 UG/DL (ref 240–430)
VIT B12 SERPL-MCNC: 901 PG/ML (ref 193–986)
WBC # BLD AUTO: 6 10E3/UL (ref 4–11)

## 2022-03-02 PROCEDURE — 99214 OFFICE O/P EST MOD 30 MIN: CPT | Performed by: FAMILY MEDICINE

## 2022-03-02 PROCEDURE — 80048 BASIC METABOLIC PNL TOTAL CA: CPT | Performed by: FAMILY MEDICINE

## 2022-03-02 PROCEDURE — 83550 IRON BINDING TEST: CPT | Performed by: FAMILY MEDICINE

## 2022-03-02 PROCEDURE — 82306 VITAMIN D 25 HYDROXY: CPT | Performed by: FAMILY MEDICINE

## 2022-03-02 PROCEDURE — 36415 COLL VENOUS BLD VENIPUNCTURE: CPT | Performed by: FAMILY MEDICINE

## 2022-03-02 PROCEDURE — 82607 VITAMIN B-12: CPT | Performed by: FAMILY MEDICINE

## 2022-03-02 PROCEDURE — 85027 COMPLETE CBC AUTOMATED: CPT | Performed by: FAMILY MEDICINE

## 2022-03-02 PROCEDURE — 82728 ASSAY OF FERRITIN: CPT | Performed by: FAMILY MEDICINE

## 2022-03-02 RX ORDER — TRIAMCINOLONE ACETONIDE 1 MG/G
CREAM TOPICAL 2 TIMES DAILY
Qty: 30 G | Refills: 3 | Status: SHIPPED | OUTPATIENT
Start: 2022-03-02

## 2022-03-02 ASSESSMENT — ANXIETY QUESTIONNAIRES
GAD7 TOTAL SCORE: 1
5. BEING SO RESTLESS THAT IT IS HARD TO SIT STILL: NOT AT ALL
7. FEELING AFRAID AS IF SOMETHING AWFUL MIGHT HAPPEN: NOT AT ALL
3. WORRYING TOO MUCH ABOUT DIFFERENT THINGS: NOT AT ALL
2. NOT BEING ABLE TO STOP OR CONTROL WORRYING: NOT AT ALL
6. BECOMING EASILY ANNOYED OR IRRITABLE: SEVERAL DAYS
1. FEELING NERVOUS, ANXIOUS, OR ON EDGE: NOT AT ALL
IF YOU CHECKED OFF ANY PROBLEMS ON THIS QUESTIONNAIRE, HOW DIFFICULT HAVE THESE PROBLEMS MADE IT FOR YOU TO DO YOUR WORK, TAKE CARE OF THINGS AT HOME, OR GET ALONG WITH OTHER PEOPLE: SOMEWHAT DIFFICULT

## 2022-03-02 ASSESSMENT — PATIENT HEALTH QUESTIONNAIRE - PHQ9
5. POOR APPETITE OR OVEREATING: NOT AT ALL
SUM OF ALL RESPONSES TO PHQ QUESTIONS 1-9: 2

## 2022-03-02 NOTE — TELEPHONE ENCOUNTER
Routing refill request to provider for review/approval because:  Labs not current:  BMP > year    Jason Onofre RN

## 2022-03-02 NOTE — PROGRESS NOTES
"  Assessment & Plan     Infected prosthetic knee joint, sequela  Cont on antibiotics   - Basic metabolic panel  (Ca, Cl, CO2, Creat, Gluc, K, Na, BUN); Future  - Basic metabolic panel  (Ca, Cl, CO2, Creat, Gluc, K, Na, BUN)    Vitamin B 12 deficiency  Recheck   - CBC with platelets; Future  - Vitamin B12; Future  - CBC with platelets  - Vitamin B12    History of anemia  As above   - CBC with platelets; Future  - Ferritin; Future  - Iron and iron binding capacity; Future  - CBC with platelets  - Ferritin  - Iron and iron binding capacity    Flexural eczema  Will treat   - triamcinolone (KENALOG) 0.1 % external cream; Apply topically 2 times daily To antecubital fossae for up to 14 days may be repeated as needed    Vitamin D deficiency  Check   - Vitamin D Deficiency; Future  - Vitamin D Deficiency               No follow-ups on file.    Constance Cohen MD  [facility] TAD Guzmán is a 89 year old who presents for the following health issues:     HPI     *Discuss vitamin D dose  *Was on B12 and Iron - discuss labs again.   Infection disease wanted a creatin check.     Rash on both arms inside and on her belly.   Marilyn Edwards CMA            Review of Systems   Constitutional, HEENT, cardiovascular, pulmonary, gi and gu systems are negative, except as otherwise noted.      Objective    /72   Pulse 83   Temp 99.3  F (37.4  C) (Tympanic)   Ht 1.626 m (5' 4\")   Wt 60.3 kg (133 lb)   SpO2 99%   BMI 22.83 kg/m    Body mass index is 22.83 kg/m .  Physical Exam   GENERAL: healthy, alert and no distress  Skin     Results for orders placed or performed in visit on 03/02/22   Basic metabolic panel  (Ca, Cl, CO2, Creat, Gluc, K, Na, BUN)     Status: Abnormal   Result Value Ref Range    Sodium 133 133 - 144 mmol/L    Potassium 4.9 3.4 - 5.3 mmol/L    Chloride 103 94 - 109 mmol/L    Carbon Dioxide (CO2) 23 20 - 32 mmol/L    Anion Gap 7 3 - 14 mmol/L    Urea Nitrogen 25 7 - 30 mg/dL    " Creatinine 0.58 0.52 - 1.04 mg/dL    Calcium 9.1 8.5 - 10.1 mg/dL    Glucose 126 (H) 70 - 99 mg/dL    GFR Estimate 85 >60 mL/min/1.73m2   CBC with platelets     Status: Abnormal   Result Value Ref Range    WBC Count 6.0 4.0 - 11.0 10e3/uL    RBC Count 3.82 3.80 - 5.20 10e6/uL    Hemoglobin 11.4 (L) 11.7 - 15.7 g/dL    Hematocrit 36.0 35.0 - 47.0 %    MCV 94 78 - 100 fL    MCH 29.8 26.5 - 33.0 pg    MCHC 31.7 31.5 - 36.5 g/dL    RDW 12.4 10.0 - 15.0 %    Platelet Count 252 150 - 450 10e3/uL   Ferritin     Status: Normal   Result Value Ref Range    Ferritin 87 8 - 252 ng/mL   Vitamin B12     Status: Normal   Result Value Ref Range    Vitamin B12 901 193 - 986 pg/mL   Vitamin D Deficiency     Status: Abnormal   Result Value Ref Range    Vitamin D, Total (25-Hydroxy) 84 (H) 20 - 75 ug/L    Narrative    Season, race, dietary intake, and treatment affect the concentration of 25-hydroxy-Vitamin D. Values may decrease during winter months and increase during summer months. Values 20-29 ug/L may indicate Vitamin D insufficiency and values <20 ug/L may indicate Vitamin D deficiency.    Vitamin D determination is routinely performed by an immunoassay specific for 25 hydroxyvitamin D3.  If an individual is on vitamin D2(ergocalciferol) supplementation, please specify 25 OH vitamin D2 and D3 level determination by LCMSMS test [id number].     Iron and iron binding capacity     Status: Normal   Result Value Ref Range    Iron 54 35 - 180 ug/dL    Iron Binding Capacity 326 240 - 430 ug/dL    Iron Sat Index 17 15 - 46 %       Constance Cohen M.D.

## 2022-03-03 LAB — DEPRECATED CALCIDIOL+CALCIFEROL SERPL-MC: 84 UG/L (ref 20–75)

## 2022-03-03 ASSESSMENT — ANXIETY QUESTIONNAIRES: GAD7 TOTAL SCORE: 1

## 2022-03-04 RX ORDER — LISINOPRIL 5 MG/1
TABLET ORAL
Qty: 90 TABLET | Refills: 0 | Status: SHIPPED | OUTPATIENT
Start: 2022-03-04 | End: 2022-06-03

## 2022-04-22 ENCOUNTER — TRANSFERRED RECORDS (OUTPATIENT)
Dept: HEALTH INFORMATION MANAGEMENT | Facility: CLINIC | Age: 87
End: 2022-04-22
Payer: MEDICARE

## 2022-04-26 DIAGNOSIS — Z96.659 INFECTED PROSTHETIC KNEE JOINT, SEQUELA: ICD-10-CM

## 2022-04-26 DIAGNOSIS — T84.59XS INFECTED PROSTHETIC KNEE JOINT, SEQUELA: ICD-10-CM

## 2022-04-26 RX ORDER — PENICILLIN V POTASSIUM 500 MG/1
500 TABLET, FILM COATED ORAL 2 TIMES DAILY
Qty: 180 TABLET | Refills: 3 | Status: SHIPPED | OUTPATIENT
Start: 2022-04-26 | End: 2023-04-26

## 2022-04-26 RX ORDER — DOXYCYCLINE 100 MG/1
100 CAPSULE ORAL 2 TIMES DAILY
Qty: 180 CAPSULE | Refills: 3 | Status: SHIPPED | OUTPATIENT
Start: 2022-04-26 | End: 2023-04-21

## 2022-05-05 ENCOUNTER — ANCILLARY PROCEDURE (OUTPATIENT)
Dept: CARDIOLOGY | Facility: CLINIC | Age: 87
End: 2022-05-05
Attending: INTERNAL MEDICINE
Payer: MEDICARE

## 2022-05-05 DIAGNOSIS — Z95.0 CARDIAC PACEMAKER IN SITU: ICD-10-CM

## 2022-05-05 LAB
MDC_IDC_LEAD_IMPLANT_DT: NORMAL
MDC_IDC_LEAD_IMPLANT_DT: NORMAL
MDC_IDC_LEAD_LOCATION: NORMAL
MDC_IDC_LEAD_LOCATION: NORMAL
MDC_IDC_LEAD_LOCATION_DETAIL_1: NORMAL
MDC_IDC_LEAD_LOCATION_DETAIL_1: NORMAL
MDC_IDC_LEAD_MFG: NORMAL
MDC_IDC_LEAD_MFG: NORMAL
MDC_IDC_LEAD_MODEL: 4261
MDC_IDC_LEAD_MODEL: NORMAL
MDC_IDC_LEAD_POLARITY_TYPE: NORMAL
MDC_IDC_LEAD_POLARITY_TYPE: NORMAL
MDC_IDC_LEAD_SERIAL: NORMAL
MDC_IDC_LEAD_SERIAL: NORMAL
MDC_IDC_PG_IMPLANT_DTM: NORMAL
MDC_IDC_PG_MFG: NORMAL
MDC_IDC_PG_MODEL: NORMAL
MDC_IDC_PG_SERIAL: NORMAL
MDC_IDC_PG_TYPE: NORMAL
MDC_IDC_SESS_CLINIC_NAME: NORMAL
MDC_IDC_SESS_DTM: NORMAL
MDC_IDC_SESS_TYPE: NORMAL

## 2022-05-05 PROCEDURE — 93293 PM PHONE R-STRIP DEVICE EVAL: CPT | Performed by: INTERNAL MEDICINE

## 2022-05-08 ENCOUNTER — HEALTH MAINTENANCE LETTER (OUTPATIENT)
Age: 87
End: 2022-05-08

## 2022-05-09 DIAGNOSIS — K21.9 GASTROESOPHAGEAL REFLUX DISEASE WITHOUT ESOPHAGITIS: ICD-10-CM

## 2022-06-02 DIAGNOSIS — I10 BENIGN ESSENTIAL HYPERTENSION: ICD-10-CM

## 2022-06-03 RX ORDER — LISINOPRIL 5 MG/1
TABLET ORAL
Qty: 90 TABLET | Refills: 0 | Status: SHIPPED | OUTPATIENT
Start: 2022-06-03

## 2022-08-11 ENCOUNTER — TELEPHONE (OUTPATIENT)
Dept: CARDIOLOGY | Facility: CLINIC | Age: 87
End: 2022-08-11

## 2022-08-11 NOTE — TELEPHONE ENCOUNTER
Pt called regarding next weeks TTM appointment with device tech. Pt reports she is moving to [place] and will not be around for this appt. She is wondering how to proceed.   Returned call to pt. Informed her we could still do the appt if she will have a land line in [place] and have her monitor/box available. Pt is unsure if she will have either available. Offered to cx appt and she could call us once she is settled with both to make an appt. Pt relieved and thankful. appt cx. Direct numbers given for device tech so she could call to set up an appt with new land line.   Informed pt that we could continue to follow her or she could establish care at [facility] with cardiology and device clinic. She reports she will be establishing care at [facility]. Pt informed that once she is established, their clinic would reach out to us to get her information transferred. The device team at [facility] would not be able to follow her with out an MD for orders, recommendations etc. Pt verbalizes understanding. No further questions at this time. OLIVER Villagran

## 2022-09-27 ENCOUNTER — ANCILLARY PROCEDURE (OUTPATIENT)
Dept: CARDIOLOGY | Facility: CLINIC | Age: 87
End: 2022-09-27
Attending: INTERNAL MEDICINE
Payer: MEDICARE

## 2022-09-27 DIAGNOSIS — Z95.0 CARDIAC PACEMAKER IN SITU: ICD-10-CM

## 2022-09-27 DIAGNOSIS — Z95.0 CARDIAC PACEMAKER IN SITU: Primary | ICD-10-CM

## 2022-09-27 PROCEDURE — 93293 PM PHONE R-STRIP DEVICE EVAL: CPT | Performed by: INTERNAL MEDICINE

## 2023-01-10 ENCOUNTER — ANCILLARY PROCEDURE (OUTPATIENT)
Dept: CARDIOLOGY | Facility: CLINIC | Age: 88
End: 2023-01-10
Attending: INTERNAL MEDICINE
Payer: MEDICARE

## 2023-01-10 DIAGNOSIS — Z95.0 CARDIAC PACEMAKER IN SITU: ICD-10-CM

## 2023-01-10 PROCEDURE — 93293 PM PHONE R-STRIP DEVICE EVAL: CPT | Performed by: INTERNAL MEDICINE

## 2023-04-24 NOTE — PROGRESS NOTES
Arielle,  Your lab results were normal/stable. Please feel free to my chart or call the office with questions. Constance Cohen M.D. [de-identified] : GENERAL APPEARANCE OF PATIENT IS WELL DEVELOPED, WELL NOURISHED, BODY HABITUS NORMAL, WELL GROOMED, NO DEFORMITIES NOTED. \par Head - Atraumatic and Normocephalic \par Eyes, Nose, and Throat: External inspection of ears and nose are normal overall without scars, lesions, or masses noted. Assessment of hearing is normal\par Neck-Examination of neck shows no masses, overall appearance is normal, neck is symmetric, tracheal position is midline, no crepitus is noted. Examination of thyroid shows no enlargement, tenderness or masses\par Respiratory- Assessment of respiratory effort shows no intercostal retractions, no use of accessory muscles, unlabored breathing, and normal diaphragmatic movement.\par Cardiovascular- Examination of extremities show no edema or varicosities\par Musculoskeletal. Examination of gait is not antalgic and station is normal\par Inspection and palpation of digits and nails shows no clubbing, cyanosis, nodules, drainage, fluctuance, petechiae\par \par • Spine – decreased range of motion in extension. \par \par \par • Neck- right C3-6 facet pain. Right trapezius and cervical paravertebral muscle spasm. 0-5 degrees in extension. 10-15 degrees in extension. \par \par • RUE- pain at 90 degrees of both active and passive abduction. Bicep tendon pain. \par \par • LUE- inspection and palpation shows no misalignment, asymmetry, crepitation, defects, tenderness, masses, effusions. ROM is normal without crepitation or contracture. No instability or subluxation or laxity is noted. No abnormal movements.\par \par \par • RLE- pain throughout the right knee on palpation and movement. \par \par \par • LLE- inspection and palpation shows no misalignment, asymmetry, crepitation, defects, tenderness, masses, effusions. ROM is normal without crepitation or contracture. No instability or subluxation or laxity is noted. No abnormal movements.\par \par \par • Skin- Inspection of skin and subcutaneous tissue shows no rashes, lesions or ulcers. Palpation of skin and subcutaneous tissue shows no rashes, no indurations, subcutaneous nodules or tightening.\par \par \par • Abdomen- Nontender\par \par \par • Neurologic- CN 2-12 are grossly intact. No sensory or motor deficits in the upper and lower extremities. Adequate strength in upper and lower extremities \par \par \par • Psychiatric- Patient’s judgment and insight are intact. Oriented to time, place and person. Recent and remote memory intact.\par

## 2023-06-02 ENCOUNTER — HEALTH MAINTENANCE LETTER (OUTPATIENT)
Age: 88
End: 2023-06-02

## 2024-06-30 ENCOUNTER — HEALTH MAINTENANCE LETTER (OUTPATIENT)
Age: 89
End: 2024-06-30